# Patient Record
Sex: FEMALE | Race: WHITE | NOT HISPANIC OR LATINO | Employment: OTHER | URBAN - NONMETROPOLITAN AREA
[De-identification: names, ages, dates, MRNs, and addresses within clinical notes are randomized per-mention and may not be internally consistent; named-entity substitution may affect disease eponyms.]

---

## 2017-01-25 ENCOUNTER — APPOINTMENT (OUTPATIENT)
Dept: GENERAL RADIOLOGY | Facility: HOSPITAL | Age: 57
End: 2017-01-25

## 2017-01-25 ENCOUNTER — HOSPITAL ENCOUNTER (OUTPATIENT)
Facility: HOSPITAL | Age: 57
Setting detail: OBSERVATION
Discharge: HOME | End: 2017-01-28
Attending: EMERGENCY MEDICINE | Admitting: INTERNAL MEDICINE

## 2017-01-25 ENCOUNTER — APPOINTMENT (OUTPATIENT)
Dept: CT IMAGING | Facility: HOSPITAL | Age: 57
End: 2017-01-25

## 2017-01-25 DIAGNOSIS — R07.2 PRECORDIAL PAIN: Primary | ICD-10-CM

## 2017-01-25 DIAGNOSIS — G62.9 NEUROPATHY: ICD-10-CM

## 2017-01-25 DIAGNOSIS — R53.1 WEAKNESS: ICD-10-CM

## 2017-01-25 PROBLEM — K21.9 GASTROESOPHAGEAL REFLUX DISEASE WITHOUT ESOPHAGITIS: Status: ACTIVE | Noted: 2017-01-25

## 2017-01-25 PROBLEM — R07.9 CHEST PAIN: Status: ACTIVE | Noted: 2017-01-25

## 2017-01-25 PROBLEM — E78.5 HYPERLIPIDEMIA: Status: ACTIVE | Noted: 2017-01-25

## 2017-01-25 PROBLEM — E11.9 TYPE 2 DIABETES MELLITUS: Status: ACTIVE | Noted: 2017-01-25

## 2017-01-25 PROBLEM — I10 ESSENTIAL HYPERTENSION: Status: ACTIVE | Noted: 2017-01-25

## 2017-01-25 LAB
ALBUMIN SERPL-MCNC: 3.5 G/DL (ref 3.4–4.8)
ALBUMIN SERPL-MCNC: 4 G/DL (ref 3.4–4.8)
ALBUMIN/GLOB SERPL: 1.3 G/DL (ref 1.1–1.8)
ALBUMIN/GLOB SERPL: 1.4 G/DL (ref 1.1–1.8)
ALP SERPL-CCNC: 43 U/L (ref 38–126)
ALP SERPL-CCNC: 60 U/L (ref 38–126)
ALT SERPL W P-5'-P-CCNC: 22 U/L (ref 9–52)
ALT SERPL W P-5'-P-CCNC: 29 U/L (ref 9–52)
ANION GAP SERPL CALCULATED.3IONS-SCNC: 14 MMOL/L (ref 5–15)
ANION GAP SERPL CALCULATED.3IONS-SCNC: 8 MMOL/L (ref 5–15)
AST SERPL-CCNC: 12 U/L (ref 14–36)
AST SERPL-CCNC: 15 U/L (ref 14–36)
BASOPHILS # BLD AUTO: 0.01 10*3/MM3 (ref 0–0.2)
BASOPHILS # BLD AUTO: 0.01 10*3/MM3 (ref 0–0.2)
BASOPHILS NFR BLD AUTO: 0.1 % (ref 0–2)
BASOPHILS NFR BLD AUTO: 0.1 % (ref 0–2)
BILIRUB SERPL-MCNC: 0.5 MG/DL (ref 0.2–1.3)
BILIRUB SERPL-MCNC: 0.5 MG/DL (ref 0.2–1.3)
BUN BLD-MCNC: 15 MG/DL (ref 7–21)
BUN BLD-MCNC: 18 MG/DL (ref 7–21)
BUN/CREAT SERPL: 18.4 (ref 7–25)
BUN/CREAT SERPL: 19.2 (ref 7–25)
CALCIUM SPEC-SCNC: 8.8 MG/DL (ref 8.4–10.2)
CALCIUM SPEC-SCNC: 9.8 MG/DL (ref 8.4–10.2)
CHLORIDE SERPL-SCNC: 101 MMOL/L (ref 95–110)
CHLORIDE SERPL-SCNC: 94 MMOL/L (ref 95–110)
CO2 SERPL-SCNC: 25 MMOL/L (ref 22–31)
CO2 SERPL-SCNC: 31 MMOL/L (ref 22–31)
CREAT BLD-MCNC: 0.78 MG/DL (ref 0.5–1)
CREAT BLD-MCNC: 0.98 MG/DL (ref 0.5–1)
DEPRECATED RDW RBC AUTO: 47.1 FL (ref 36.4–46.3)
DEPRECATED RDW RBC AUTO: 48.2 FL (ref 36.4–46.3)
EOSINOPHIL # BLD AUTO: 0 10*3/MM3 (ref 0–0.7)
EOSINOPHIL # BLD AUTO: 0.05 10*3/MM3 (ref 0–0.7)
EOSINOPHIL NFR BLD AUTO: 0 % (ref 0–7)
EOSINOPHIL NFR BLD AUTO: 0.5 % (ref 0–7)
ERYTHROCYTE [DISTWIDTH] IN BLOOD BY AUTOMATED COUNT: 14.9 % (ref 11.5–14.5)
ERYTHROCYTE [DISTWIDTH] IN BLOOD BY AUTOMATED COUNT: 15.3 % (ref 11.5–14.5)
GFR SERPL CREATININE-BSD FRML MDRD: 59 ML/MIN/1.73 (ref 51–120)
GFR SERPL CREATININE-BSD FRML MDRD: 77 ML/MIN/1.73 (ref 51–120)
GLOBULIN UR ELPH-MCNC: 2.5 GM/DL (ref 2.3–3.5)
GLOBULIN UR ELPH-MCNC: 3 GM/DL (ref 2.3–3.5)
GLUCOSE BLD-MCNC: 101 MG/DL (ref 60–100)
GLUCOSE BLD-MCNC: 162 MG/DL (ref 60–100)
GLUCOSE BLDC GLUCOMTR-MCNC: 187 MG/DL (ref 70–130)
HCG SERPL QL: NEGATIVE
HCT VFR BLD AUTO: 38.2 % (ref 35–45)
HCT VFR BLD AUTO: 42.6 % (ref 35–45)
HGB BLD-MCNC: 12.9 G/DL (ref 12–15.5)
HGB BLD-MCNC: 14.2 G/DL (ref 12–15.5)
IMM GRANULOCYTES # BLD: 0.04 10*3/MM3 (ref 0–0.02)
IMM GRANULOCYTES # BLD: 0.07 10*3/MM3 (ref 0–0.02)
IMM GRANULOCYTES NFR BLD: 0.4 % (ref 0–0.5)
IMM GRANULOCYTES NFR BLD: 0.7 % (ref 0–0.5)
LYMPHOCYTES # BLD AUTO: 1.8 10*3/MM3 (ref 0.6–4.2)
LYMPHOCYTES # BLD AUTO: 3.4 10*3/MM3 (ref 0.6–4.2)
LYMPHOCYTES NFR BLD AUTO: 17.7 % (ref 10–50)
LYMPHOCYTES NFR BLD AUTO: 33.2 % (ref 10–50)
MCH RBC QN AUTO: 28.7 PG (ref 26.5–34)
MCH RBC QN AUTO: 29.1 PG (ref 26.5–34)
MCHC RBC AUTO-ENTMCNC: 33.3 G/DL (ref 31.4–36)
MCHC RBC AUTO-ENTMCNC: 33.8 G/DL (ref 31.4–36)
MCV RBC AUTO: 86.2 FL (ref 80–98)
MCV RBC AUTO: 86.2 FL (ref 80–98)
MONOCYTES # BLD AUTO: 0.81 10*3/MM3 (ref 0–0.9)
MONOCYTES # BLD AUTO: 0.83 10*3/MM3 (ref 0–0.9)
MONOCYTES NFR BLD AUTO: 8 % (ref 0–12)
MONOCYTES NFR BLD AUTO: 8.1 % (ref 0–12)
NEUTROPHILS # BLD AUTO: 5.91 10*3/MM3 (ref 2–8.6)
NEUTROPHILS # BLD AUTO: 7.48 10*3/MM3 (ref 2–8.6)
NEUTROPHILS NFR BLD AUTO: 57.7 % (ref 37–80)
NEUTROPHILS NFR BLD AUTO: 73.5 % (ref 37–80)
NT-PROBNP SERPL-MCNC: 111 PG/ML (ref 0–900)
PLATELET # BLD AUTO: 269 10*3/MM3 (ref 150–450)
PLATELET # BLD AUTO: 323 10*3/MM3 (ref 150–450)
PMV BLD AUTO: 10.2 FL (ref 8–12)
PMV BLD AUTO: 10.3 FL (ref 8–12)
POTASSIUM BLD-SCNC: 3.7 MMOL/L (ref 3.5–5.1)
POTASSIUM BLD-SCNC: 4 MMOL/L (ref 3.5–5.1)
PROT SERPL-MCNC: 6 G/DL (ref 6.3–8.6)
PROT SERPL-MCNC: 7 G/DL (ref 6.3–8.6)
RBC # BLD AUTO: 4.43 10*6/MM3 (ref 3.77–5.16)
RBC # BLD AUTO: 4.94 10*6/MM3 (ref 3.77–5.16)
SODIUM BLD-SCNC: 133 MMOL/L (ref 137–145)
SODIUM BLD-SCNC: 140 MMOL/L (ref 137–145)
TROPONIN I SERPL-MCNC: <0.012 NG/ML
TROPONIN I SERPL-MCNC: <0.012 NG/ML
WBC NRBC COR # BLD: 10.17 10*3/MM3 (ref 3.2–9.8)
WBC NRBC COR # BLD: 10.24 10*3/MM3 (ref 3.2–9.8)
WHOLE BLOOD HOLD SPECIMEN: NORMAL

## 2017-01-25 PROCEDURE — 99284 EMERGENCY DEPT VISIT MOD MDM: CPT

## 2017-01-25 PROCEDURE — 94640 AIRWAY INHALATION TREATMENT: CPT

## 2017-01-25 PROCEDURE — 80053 COMPREHEN METABOLIC PANEL: CPT | Performed by: STUDENT IN AN ORGANIZED HEALTH CARE EDUCATION/TRAINING PROGRAM

## 2017-01-25 PROCEDURE — 84703 CHORIONIC GONADOTROPIN ASSAY: CPT | Performed by: EMERGENCY MEDICINE

## 2017-01-25 PROCEDURE — 25010000002 ONDANSETRON PER 1 MG: Performed by: EMERGENCY MEDICINE

## 2017-01-25 PROCEDURE — 80053 COMPREHEN METABOLIC PANEL: CPT | Performed by: EMERGENCY MEDICINE

## 2017-01-25 PROCEDURE — 70450 CT HEAD/BRAIN W/O DYE: CPT

## 2017-01-25 PROCEDURE — 93005 ELECTROCARDIOGRAM TRACING: CPT | Performed by: EMERGENCY MEDICINE

## 2017-01-25 PROCEDURE — 84484 ASSAY OF TROPONIN QUANT: CPT | Performed by: STUDENT IN AN ORGANIZED HEALTH CARE EDUCATION/TRAINING PROGRAM

## 2017-01-25 PROCEDURE — 83880 ASSAY OF NATRIURETIC PEPTIDE: CPT | Performed by: EMERGENCY MEDICINE

## 2017-01-25 PROCEDURE — G0378 HOSPITAL OBSERVATION PER HR: HCPCS

## 2017-01-25 PROCEDURE — 71020 HC CHEST PA AND LATERAL: CPT

## 2017-01-25 PROCEDURE — 25010000002 HYDROMORPHONE PER 4 MG: Performed by: EMERGENCY MEDICINE

## 2017-01-25 PROCEDURE — 93010 ELECTROCARDIOGRAM REPORT: CPT | Performed by: INTERNAL MEDICINE

## 2017-01-25 PROCEDURE — 85025 COMPLETE CBC W/AUTO DIFF WBC: CPT | Performed by: STUDENT IN AN ORGANIZED HEALTH CARE EDUCATION/TRAINING PROGRAM

## 2017-01-25 PROCEDURE — 96375 TX/PRO/DX INJ NEW DRUG ADDON: CPT

## 2017-01-25 PROCEDURE — 82962 GLUCOSE BLOOD TEST: CPT

## 2017-01-25 PROCEDURE — 93005 ELECTROCARDIOGRAM TRACING: CPT

## 2017-01-25 PROCEDURE — 93005 ELECTROCARDIOGRAM TRACING: CPT | Performed by: STUDENT IN AN ORGANIZED HEALTH CARE EDUCATION/TRAINING PROGRAM

## 2017-01-25 PROCEDURE — 85025 COMPLETE CBC W/AUTO DIFF WBC: CPT | Performed by: EMERGENCY MEDICINE

## 2017-01-25 PROCEDURE — 96374 THER/PROPH/DIAG INJ IV PUSH: CPT

## 2017-01-25 PROCEDURE — 84484 ASSAY OF TROPONIN QUANT: CPT | Performed by: EMERGENCY MEDICINE

## 2017-01-25 RX ORDER — SODIUM CHLORIDE 0.9 % (FLUSH) 0.9 %
10 SYRINGE (ML) INJECTION AS NEEDED
Status: DISCONTINUED | OUTPATIENT
Start: 2017-01-25 | End: 2017-01-28 | Stop reason: HOSPADM

## 2017-01-25 RX ORDER — LOSARTAN POTASSIUM 25 MG/1
12.5 TABLET ORAL NIGHTLY
COMMUNITY
Start: 2015-05-05

## 2017-01-25 RX ORDER — ONDANSETRON 2 MG/ML
4 INJECTION INTRAMUSCULAR; INTRAVENOUS ONCE
Status: COMPLETED | OUTPATIENT
Start: 2017-01-25 | End: 2017-01-25

## 2017-01-25 RX ORDER — EPINEPHRINE 0.15 MG/.3ML
0.15 INJECTION INTRAMUSCULAR AS NEEDED
COMMUNITY
End: 2022-10-28 | Stop reason: SDUPTHER

## 2017-01-25 RX ORDER — ASPIRIN 81 MG/1
81 TABLET ORAL DAILY
Status: DISCONTINUED | OUTPATIENT
Start: 2017-01-26 | End: 2017-01-28 | Stop reason: HOSPADM

## 2017-01-25 RX ORDER — BUDESONIDE 0.5 MG/2ML
0.5 INHALANT ORAL
Status: DISCONTINUED | OUTPATIENT
Start: 2017-01-25 | End: 2017-01-28 | Stop reason: HOSPADM

## 2017-01-25 RX ORDER — OMEPRAZOLE 20 MG/1
40 CAPSULE, DELAYED RELEASE ORAL DAILY
COMMUNITY
End: 2019-03-12

## 2017-01-25 RX ORDER — ASPIRIN 81 MG/1
324 TABLET, CHEWABLE ORAL ONCE
Status: DISCONTINUED | OUTPATIENT
Start: 2017-01-25 | End: 2017-01-25 | Stop reason: SDUPTHER

## 2017-01-25 RX ORDER — NALOXONE HCL 0.4 MG/ML
0.4 VIAL (ML) INJECTION
Status: DISCONTINUED | OUTPATIENT
Start: 2017-01-25 | End: 2017-01-28 | Stop reason: HOSPADM

## 2017-01-25 RX ORDER — LOSARTAN POTASSIUM 25 MG/1
25 TABLET ORAL
Status: DISCONTINUED | OUTPATIENT
Start: 2017-01-26 | End: 2017-01-27

## 2017-01-25 RX ORDER — LEVOCETIRIZINE DIHYDROCHLORIDE 5 MG/1
5 TABLET, FILM COATED ORAL DAILY PRN
COMMUNITY
End: 2020-11-15

## 2017-01-25 RX ORDER — ALBUTEROL SULFATE 90 UG/1
2 AEROSOL, METERED RESPIRATORY (INHALATION) EVERY 6 HOURS
COMMUNITY
End: 2022-09-15

## 2017-01-25 RX ORDER — METOPROLOL SUCCINATE 25 MG/1
25 TABLET, EXTENDED RELEASE ORAL DAILY
Status: DISCONTINUED | OUTPATIENT
Start: 2017-01-26 | End: 2017-01-28 | Stop reason: HOSPADM

## 2017-01-25 RX ORDER — ACETAMINOPHEN 325 MG/1
650 TABLET ORAL EVERY 6 HOURS PRN
Status: DISCONTINUED | OUTPATIENT
Start: 2017-01-25 | End: 2017-01-28 | Stop reason: HOSPADM

## 2017-01-25 RX ORDER — CETIRIZINE HYDROCHLORIDE 5 MG/1
5 TABLET ORAL NIGHTLY
Status: DISCONTINUED | OUTPATIENT
Start: 2017-01-25 | End: 2017-01-28 | Stop reason: HOSPADM

## 2017-01-25 RX ORDER — SIMVASTATIN 10 MG
10 TABLET ORAL NIGHTLY
COMMUNITY
End: 2019-03-12

## 2017-01-25 RX ORDER — DEXTROSE MONOHYDRATE 25 G/50ML
25 INJECTION, SOLUTION INTRAVENOUS
Status: DISCONTINUED | OUTPATIENT
Start: 2017-01-25 | End: 2017-01-28 | Stop reason: HOSPADM

## 2017-01-25 RX ORDER — PANTOPRAZOLE SODIUM 40 MG/1
40 TABLET, DELAYED RELEASE ORAL
Status: DISCONTINUED | OUTPATIENT
Start: 2017-01-26 | End: 2017-01-28 | Stop reason: HOSPADM

## 2017-01-25 RX ORDER — PREGABALIN 75 MG/1
75 CAPSULE ORAL 2 TIMES DAILY
COMMUNITY
Start: 2015-06-23 | End: 2019-03-12

## 2017-01-25 RX ORDER — DOCUSATE SODIUM 100 MG/1
100 CAPSULE, LIQUID FILLED ORAL 2 TIMES DAILY
Status: DISCONTINUED | OUTPATIENT
Start: 2017-01-26 | End: 2017-01-28 | Stop reason: HOSPADM

## 2017-01-25 RX ORDER — ATORVASTATIN CALCIUM 10 MG/1
10 TABLET, FILM COATED ORAL NIGHTLY
Status: DISCONTINUED | OUTPATIENT
Start: 2017-01-25 | End: 2017-01-28 | Stop reason: HOSPADM

## 2017-01-25 RX ORDER — MORPHINE SULFATE 2 MG/ML
1 INJECTION, SOLUTION INTRAMUSCULAR; INTRAVENOUS EVERY 4 HOURS PRN
Status: DISCONTINUED | OUTPATIENT
Start: 2017-01-25 | End: 2017-01-28 | Stop reason: HOSPADM

## 2017-01-25 RX ORDER — SODIUM CHLORIDE 0.9 % (FLUSH) 0.9 %
1-10 SYRINGE (ML) INJECTION AS NEEDED
Status: DISCONTINUED | OUTPATIENT
Start: 2017-01-25 | End: 2017-01-28 | Stop reason: HOSPADM

## 2017-01-25 RX ORDER — METHYLPREDNISOLONE 4 MG/1
4 TABLET ORAL 2 TIMES DAILY
COMMUNITY
End: 2017-01-28 | Stop reason: HOSPADM

## 2017-01-25 RX ORDER — ALBUTEROL SULFATE 2.5 MG/3ML
2.5 SOLUTION RESPIRATORY (INHALATION)
Status: DISCONTINUED | OUTPATIENT
Start: 2017-01-26 | End: 2017-01-28 | Stop reason: HOSPADM

## 2017-01-25 RX ORDER — NICOTINE POLACRILEX 4 MG
15 LOZENGE BUCCAL
Status: DISCONTINUED | OUTPATIENT
Start: 2017-01-25 | End: 2017-01-28 | Stop reason: HOSPADM

## 2017-01-25 RX ORDER — MELOXICAM 15 MG/1
15 TABLET ORAL NIGHTLY
COMMUNITY
End: 2019-03-12

## 2017-01-25 RX ORDER — ONDANSETRON 2 MG/ML
4 INJECTION INTRAMUSCULAR; INTRAVENOUS EVERY 6 HOURS PRN
Status: DISCONTINUED | OUTPATIENT
Start: 2017-01-25 | End: 2017-01-28 | Stop reason: HOSPADM

## 2017-01-25 RX ORDER — PREGABALIN 75 MG/1
75 CAPSULE ORAL EVERY 12 HOURS SCHEDULED
Status: DISCONTINUED | OUTPATIENT
Start: 2017-01-25 | End: 2017-01-28 | Stop reason: HOSPADM

## 2017-01-25 RX ORDER — RANITIDINE 150 MG/1
300 TABLET ORAL NIGHTLY
COMMUNITY
End: 2020-08-05

## 2017-01-25 RX ORDER — ASPIRIN 325 MG
325 TABLET ORAL ONCE
Status: DISCONTINUED | OUTPATIENT
Start: 2017-01-25 | End: 2017-01-25

## 2017-01-25 RX ORDER — ALBUTEROL SULFATE 90 UG/1
2 AEROSOL, METERED RESPIRATORY (INHALATION) EVERY 6 HOURS
Status: DISCONTINUED | OUTPATIENT
Start: 2017-01-25 | End: 2017-01-25 | Stop reason: CLARIF

## 2017-01-25 RX ADMIN — HYDROMORPHONE HYDROCHLORIDE 0.5 MG: 1 INJECTION, SOLUTION INTRAMUSCULAR; INTRAVENOUS; SUBCUTANEOUS at 14:41

## 2017-01-25 RX ADMIN — ONDANSETRON 4 MG: 2 INJECTION INTRAMUSCULAR; INTRAVENOUS at 14:42

## 2017-01-25 RX ADMIN — ACETAMINOPHEN 650 MG: 325 TABLET ORAL at 22:34

## 2017-01-25 RX ADMIN — ALBUTEROL SULFATE 2.5 MG: 2.5 SOLUTION RESPIRATORY (INHALATION) at 23:42

## 2017-01-25 RX ADMIN — NITROGLYCERIN 1 INCH: 20 OINTMENT TOPICAL at 15:37

## 2017-01-26 ENCOUNTER — APPOINTMENT (OUTPATIENT)
Dept: CARDIOLOGY | Facility: HOSPITAL | Age: 57
End: 2017-01-26
Attending: STUDENT IN AN ORGANIZED HEALTH CARE EDUCATION/TRAINING PROGRAM

## 2017-01-26 ENCOUNTER — APPOINTMENT (OUTPATIENT)
Dept: ULTRASOUND IMAGING | Facility: HOSPITAL | Age: 57
End: 2017-01-26

## 2017-01-26 LAB
ALBUMIN SERPL-MCNC: 3.4 G/DL (ref 3.4–4.8)
ALBUMIN/GLOB SERPL: 1.4 G/DL (ref 1.1–1.8)
ALP SERPL-CCNC: 46 U/L (ref 38–126)
ALT SERPL W P-5'-P-CCNC: 25 U/L (ref 9–52)
ANION GAP SERPL CALCULATED.3IONS-SCNC: 10 MMOL/L (ref 5–15)
ARTICHOKE IGE QN: 87 MG/DL (ref 1–129)
AST SERPL-CCNC: 15 U/L (ref 14–36)
BASOPHILS # BLD AUTO: 0.01 10*3/MM3 (ref 0–0.2)
BASOPHILS NFR BLD AUTO: 0.1 % (ref 0–2)
BILIRUB SERPL-MCNC: 0.5 MG/DL (ref 0.2–1.3)
BUN BLD-MCNC: 18 MG/DL (ref 7–21)
BUN/CREAT SERPL: 19.1 (ref 7–25)
CALCIUM SPEC-SCNC: 8.5 MG/DL (ref 8.4–10.2)
CHLORIDE SERPL-SCNC: 100 MMOL/L (ref 95–110)
CHOLEST SERPL-MCNC: 174 MG/DL (ref 0–199)
CO2 SERPL-SCNC: 29 MMOL/L (ref 22–31)
CREAT BLD-MCNC: 0.94 MG/DL (ref 0.5–1)
DEPRECATED RDW RBC AUTO: 47.6 FL (ref 36.4–46.3)
EOSINOPHIL # BLD AUTO: 0.06 10*3/MM3 (ref 0–0.7)
EOSINOPHIL NFR BLD AUTO: 0.7 % (ref 0–7)
ERYTHROCYTE [DISTWIDTH] IN BLOOD BY AUTOMATED COUNT: 15 % (ref 11.5–14.5)
GFR SERPL CREATININE-BSD FRML MDRD: 62 ML/MIN/1.73 (ref 51–120)
GLOBULIN UR ELPH-MCNC: 2.4 GM/DL (ref 2.3–3.5)
GLUCOSE BLD-MCNC: 110 MG/DL (ref 60–100)
GLUCOSE BLDC GLUCOMTR-MCNC: 102 MG/DL (ref 70–130)
GLUCOSE BLDC GLUCOMTR-MCNC: 164 MG/DL (ref 70–130)
HCT VFR BLD AUTO: 38.6 % (ref 35–45)
HDLC SERPL-MCNC: 61 MG/DL (ref 60–200)
HGB BLD-MCNC: 12.7 G/DL (ref 12–15.5)
IMM GRANULOCYTES # BLD: 0.05 10*3/MM3 (ref 0–0.02)
IMM GRANULOCYTES NFR BLD: 0.6 % (ref 0–0.5)
LDLC/HDLC SERPL: 1.45 {RATIO} (ref 0–3.22)
LYMPHOCYTES # BLD AUTO: 3.24 10*3/MM3 (ref 0.6–4.2)
LYMPHOCYTES NFR BLD AUTO: 37.1 % (ref 10–50)
MCH RBC QN AUTO: 28.7 PG (ref 26.5–34)
MCHC RBC AUTO-ENTMCNC: 32.9 G/DL (ref 31.4–36)
MCV RBC AUTO: 87.3 FL (ref 80–98)
MONOCYTES # BLD AUTO: 0.69 10*3/MM3 (ref 0–0.9)
MONOCYTES NFR BLD AUTO: 7.9 % (ref 0–12)
NEUTROPHILS # BLD AUTO: 4.69 10*3/MM3 (ref 2–8.6)
NEUTROPHILS NFR BLD AUTO: 53.6 % (ref 37–80)
PLATELET # BLD AUTO: 244 10*3/MM3 (ref 150–450)
PMV BLD AUTO: 10.5 FL (ref 8–12)
POTASSIUM BLD-SCNC: 3.8 MMOL/L (ref 3.5–5.1)
PROT SERPL-MCNC: 5.8 G/DL (ref 6.3–8.6)
RBC # BLD AUTO: 4.42 10*6/MM3 (ref 3.77–5.16)
SODIUM BLD-SCNC: 139 MMOL/L (ref 137–145)
TRIGL SERPL-MCNC: 124 MG/DL (ref 20–199)
TROPONIN I SERPL-MCNC: <0.012 NG/ML
TROPONIN I SERPL-MCNC: <0.012 NG/ML
WBC NRBC COR # BLD: 8.74 10*3/MM3 (ref 3.2–9.8)

## 2017-01-26 PROCEDURE — 80053 COMPREHEN METABOLIC PANEL: CPT | Performed by: STUDENT IN AN ORGANIZED HEALTH CARE EDUCATION/TRAINING PROGRAM

## 2017-01-26 PROCEDURE — 25010000002 MORPHINE SULFATE (PF) 2 MG/ML SOLUTION: Performed by: STUDENT IN AN ORGANIZED HEALTH CARE EDUCATION/TRAINING PROGRAM

## 2017-01-26 PROCEDURE — 96375 TX/PRO/DX INJ NEW DRUG ADDON: CPT

## 2017-01-26 PROCEDURE — G0378 HOSPITAL OBSERVATION PER HR: HCPCS

## 2017-01-26 PROCEDURE — 63710000001 INSULIN ASPART PER 5 UNITS: Performed by: STUDENT IN AN ORGANIZED HEALTH CARE EDUCATION/TRAINING PROGRAM

## 2017-01-26 PROCEDURE — 93923 UPR/LXTR ART STDY 3+ LVLS: CPT

## 2017-01-26 PROCEDURE — 82962 GLUCOSE BLOOD TEST: CPT

## 2017-01-26 PROCEDURE — 96376 TX/PRO/DX INJ SAME DRUG ADON: CPT

## 2017-01-26 PROCEDURE — 94640 AIRWAY INHALATION TREATMENT: CPT

## 2017-01-26 PROCEDURE — 84484 ASSAY OF TROPONIN QUANT: CPT | Performed by: STUDENT IN AN ORGANIZED HEALTH CARE EDUCATION/TRAINING PROGRAM

## 2017-01-26 PROCEDURE — 93306 TTE W/DOPPLER COMPLETE: CPT

## 2017-01-26 PROCEDURE — 85025 COMPLETE CBC W/AUTO DIFF WBC: CPT | Performed by: STUDENT IN AN ORGANIZED HEALTH CARE EDUCATION/TRAINING PROGRAM

## 2017-01-26 PROCEDURE — 80061 LIPID PANEL: CPT | Performed by: STUDENT IN AN ORGANIZED HEALTH CARE EDUCATION/TRAINING PROGRAM

## 2017-01-26 RX ORDER — BUTALBITAL, ACETAMINOPHEN AND CAFFEINE 50; 325; 40 MG/1; MG/1; MG/1
1 TABLET ORAL EVERY 4 HOURS PRN
Status: DISCONTINUED | OUTPATIENT
Start: 2017-01-26 | End: 2017-01-28 | Stop reason: HOSPADM

## 2017-01-26 RX ORDER — IBUPROFEN 400 MG/1
400 TABLET ORAL EVERY 8 HOURS SCHEDULED
Status: DISCONTINUED | OUTPATIENT
Start: 2017-01-26 | End: 2017-01-28 | Stop reason: HOSPADM

## 2017-01-26 RX ADMIN — ALBUTEROL SULFATE 2.5 MG: 2.5 SOLUTION RESPIRATORY (INHALATION) at 06:56

## 2017-01-26 RX ADMIN — ALBUTEROL SULFATE 2.5 MG: 2.5 SOLUTION RESPIRATORY (INHALATION) at 19:07

## 2017-01-26 RX ADMIN — PREGABALIN 75 MG: 75 CAPSULE ORAL at 20:41

## 2017-01-26 RX ADMIN — BUDESONIDE 0.5 MG: 0.5 INHALANT RESPIRATORY (INHALATION) at 06:56

## 2017-01-26 RX ADMIN — IBUPROFEN 400 MG: 400 TABLET, FILM COATED ORAL at 13:53

## 2017-01-26 RX ADMIN — BUTALBITAL, ACETAMINOPHEN, AND CAFFEINE 1 TABLET: 50; 325; 40 TABLET ORAL at 21:49

## 2017-01-26 RX ADMIN — Medication 10 ML: at 13:23

## 2017-01-26 RX ADMIN — DOCUSATE SODIUM 100 MG: 100 CAPSULE, LIQUID FILLED ORAL at 18:11

## 2017-01-26 RX ADMIN — ASPIRIN 81 MG: 81 TABLET, COATED ORAL at 08:48

## 2017-01-26 RX ADMIN — BUDESONIDE 0.5 MG: 0.5 INHALANT RESPIRATORY (INHALATION) at 19:08

## 2017-01-26 RX ADMIN — ATORVASTATIN CALCIUM 10 MG: 10 TABLET, FILM COATED ORAL at 20:41

## 2017-01-26 RX ADMIN — IBUPROFEN 400 MG: 400 TABLET, FILM COATED ORAL at 21:48

## 2017-01-26 RX ADMIN — PREGABALIN 75 MG: 75 CAPSULE ORAL at 08:52

## 2017-01-26 RX ADMIN — MORPHINE SULFATE 1 MG: 2 INJECTION, SOLUTION INTRAMUSCULAR; INTRAVENOUS at 08:35

## 2017-01-26 RX ADMIN — CETIRIZINE HYDROCHLORIDE 5 MG: 5 TABLET, FILM COATED ORAL at 20:41

## 2017-01-26 RX ADMIN — INSULIN ASPART 2 UNITS: 100 INJECTION, SOLUTION INTRAVENOUS; SUBCUTANEOUS at 12:12

## 2017-01-26 RX ADMIN — MORPHINE SULFATE 1 MG: 2 INJECTION, SOLUTION INTRAMUSCULAR; INTRAVENOUS at 02:04

## 2017-01-26 RX ADMIN — ALBUTEROL SULFATE 2.5 MG: 2.5 SOLUTION RESPIRATORY (INHALATION) at 15:00

## 2017-01-26 RX ADMIN — MORPHINE SULFATE 1 MG: 2 INJECTION, SOLUTION INTRAMUSCULAR; INTRAVENOUS at 13:22

## 2017-01-27 ENCOUNTER — APPOINTMENT (OUTPATIENT)
Dept: ULTRASOUND IMAGING | Facility: HOSPITAL | Age: 57
End: 2017-01-27

## 2017-01-27 ENCOUNTER — HOSPITAL ENCOUNTER (OUTPATIENT)
Dept: ULTRASOUND IMAGING | Facility: HOSPITAL | Age: 57
End: 2017-01-27

## 2017-01-27 LAB
ALBUMIN SERPL-MCNC: 3 G/DL (ref 3.4–4.8)
ALBUMIN/GLOB SERPL: 1.2 G/DL (ref 1.1–1.8)
ALP SERPL-CCNC: 47 U/L (ref 38–126)
ALT SERPL W P-5'-P-CCNC: 32 U/L (ref 9–52)
ANION GAP SERPL CALCULATED.3IONS-SCNC: 8 MMOL/L (ref 5–15)
AST SERPL-CCNC: 11 U/L (ref 14–36)
BASOPHILS # BLD AUTO: 0.01 10*3/MM3 (ref 0–0.2)
BASOPHILS NFR BLD AUTO: 0.1 % (ref 0–2)
BILIRUB SERPL-MCNC: 0.3 MG/DL (ref 0.2–1.3)
BILIRUB UR QL STRIP: NEGATIVE
BUN BLD-MCNC: 17 MG/DL (ref 7–21)
BUN/CREAT SERPL: 19.3 (ref 7–25)
CALCIUM SPEC-SCNC: 8.8 MG/DL (ref 8.4–10.2)
CHLORIDE SERPL-SCNC: 102 MMOL/L (ref 95–110)
CLARITY UR: CLEAR
CO2 SERPL-SCNC: 29 MMOL/L (ref 22–31)
COLOR UR: YELLOW
CREAT BLD-MCNC: 0.88 MG/DL (ref 0.5–1)
DEPRECATED RDW RBC AUTO: 50.3 FL (ref 36.4–46.3)
EOSINOPHIL # BLD AUTO: 0.11 10*3/MM3 (ref 0–0.7)
EOSINOPHIL NFR BLD AUTO: 1.4 % (ref 0–7)
ERYTHROCYTE [DISTWIDTH] IN BLOOD BY AUTOMATED COUNT: 15.4 % (ref 11.5–14.5)
GFR SERPL CREATININE-BSD FRML MDRD: 67 ML/MIN/1.73 (ref 60–120)
GLOBULIN UR ELPH-MCNC: 2.5 GM/DL (ref 2.3–3.5)
GLUCOSE BLD-MCNC: 106 MG/DL (ref 60–100)
GLUCOSE BLDC GLUCOMTR-MCNC: 102 MG/DL (ref 70–130)
GLUCOSE BLDC GLUCOMTR-MCNC: 105 MG/DL (ref 70–130)
GLUCOSE BLDC GLUCOMTR-MCNC: 105 MG/DL (ref 70–130)
GLUCOSE BLDC GLUCOMTR-MCNC: 128 MG/DL (ref 70–130)
GLUCOSE UR STRIP-MCNC: NEGATIVE MG/DL
HCT VFR BLD AUTO: 39.3 % (ref 35–45)
HGB BLD-MCNC: 12.7 G/DL (ref 12–15.5)
HGB UR QL STRIP.AUTO: NEGATIVE
IMM GRANULOCYTES # BLD: 0.04 10*3/MM3 (ref 0–0.02)
IMM GRANULOCYTES NFR BLD: 0.5 % (ref 0–0.5)
KETONES UR QL STRIP: NEGATIVE
LEUKOCYTE ESTERASE UR QL STRIP.AUTO: NEGATIVE
LYMPHOCYTES # BLD AUTO: 2.92 10*3/MM3 (ref 0.6–4.2)
LYMPHOCYTES NFR BLD AUTO: 38.2 % (ref 10–50)
MCH RBC QN AUTO: 29.1 PG (ref 26.5–34)
MCHC RBC AUTO-ENTMCNC: 32.3 G/DL (ref 31.4–36)
MCV RBC AUTO: 89.9 FL (ref 80–98)
MONOCYTES # BLD AUTO: 0.72 10*3/MM3 (ref 0–0.9)
MONOCYTES NFR BLD AUTO: 9.4 % (ref 0–12)
NEUTROPHILS # BLD AUTO: 3.84 10*3/MM3 (ref 2–8.6)
NEUTROPHILS NFR BLD AUTO: 50.4 % (ref 37–80)
NITRITE UR QL STRIP: NEGATIVE
NRBC BLD MANUAL-RTO: 0 /100 WBC (ref 0–0)
PH UR STRIP.AUTO: 7 [PH] (ref 5–9)
PLATELET # BLD AUTO: 263 10*3/MM3 (ref 150–450)
PMV BLD AUTO: 10.9 FL (ref 8–12)
POTASSIUM BLD-SCNC: 4 MMOL/L (ref 3.5–5.1)
PROT SERPL-MCNC: 5.5 G/DL (ref 6.3–8.6)
PROT UR QL STRIP: NEGATIVE
RBC # BLD AUTO: 4.37 10*6/MM3 (ref 3.77–5.16)
SODIUM BLD-SCNC: 139 MMOL/L (ref 137–145)
SP GR UR STRIP: 1.01 (ref 1–1.03)
UROBILINOGEN UR QL STRIP: NORMAL
WBC NRBC COR # BLD: 7.64 10*3/MM3 (ref 3.2–9.8)

## 2017-01-27 PROCEDURE — 76705 ECHO EXAM OF ABDOMEN: CPT

## 2017-01-27 PROCEDURE — 82962 GLUCOSE BLOOD TEST: CPT

## 2017-01-27 PROCEDURE — 94640 AIRWAY INHALATION TREATMENT: CPT

## 2017-01-27 PROCEDURE — G0378 HOSPITAL OBSERVATION PER HR: HCPCS

## 2017-01-27 PROCEDURE — 80053 COMPREHEN METABOLIC PANEL: CPT | Performed by: STUDENT IN AN ORGANIZED HEALTH CARE EDUCATION/TRAINING PROGRAM

## 2017-01-27 PROCEDURE — 85025 COMPLETE CBC W/AUTO DIFF WBC: CPT | Performed by: STUDENT IN AN ORGANIZED HEALTH CARE EDUCATION/TRAINING PROGRAM

## 2017-01-27 PROCEDURE — 87040 BLOOD CULTURE FOR BACTERIA: CPT | Performed by: INTERNAL MEDICINE

## 2017-01-27 PROCEDURE — 81003 URINALYSIS AUTO W/O SCOPE: CPT | Performed by: INTERNAL MEDICINE

## 2017-01-27 RX ADMIN — METOPROLOL SUCCINATE 25 MG: 25 TABLET, EXTENDED RELEASE ORAL at 08:21

## 2017-01-27 RX ADMIN — DOCUSATE SODIUM 100 MG: 100 CAPSULE, LIQUID FILLED ORAL at 17:27

## 2017-01-27 RX ADMIN — ACETAMINOPHEN 650 MG: 325 TABLET ORAL at 17:16

## 2017-01-27 RX ADMIN — PANTOPRAZOLE SODIUM 40 MG: 40 TABLET, DELAYED RELEASE ORAL at 06:04

## 2017-01-27 RX ADMIN — IBUPROFEN 400 MG: 400 TABLET, FILM COATED ORAL at 22:08

## 2017-01-27 RX ADMIN — CETIRIZINE HYDROCHLORIDE 5 MG: 5 TABLET, FILM COATED ORAL at 20:31

## 2017-01-27 RX ADMIN — PREGABALIN 75 MG: 75 CAPSULE ORAL at 08:22

## 2017-01-27 RX ADMIN — DOCUSATE SODIUM 100 MG: 100 CAPSULE, LIQUID FILLED ORAL at 08:22

## 2017-01-27 RX ADMIN — BUDESONIDE 0.5 MG: 0.5 INHALANT RESPIRATORY (INHALATION) at 07:03

## 2017-01-27 RX ADMIN — ALBUTEROL SULFATE 2.5 MG: 2.5 SOLUTION RESPIRATORY (INHALATION) at 07:02

## 2017-01-27 RX ADMIN — ATORVASTATIN CALCIUM 10 MG: 10 TABLET, FILM COATED ORAL at 20:30

## 2017-01-27 RX ADMIN — SODIUM CHLORIDE 500 ML: 9 INJECTION, SOLUTION INTRAVENOUS at 11:26

## 2017-01-27 RX ADMIN — ASPIRIN 81 MG: 81 TABLET, COATED ORAL at 08:21

## 2017-01-27 RX ADMIN — ALBUTEROL SULFATE 2.5 MG: 2.5 SOLUTION RESPIRATORY (INHALATION) at 14:22

## 2017-01-27 RX ADMIN — ALBUTEROL SULFATE 2.5 MG: 2.5 SOLUTION RESPIRATORY (INHALATION) at 21:14

## 2017-01-27 RX ADMIN — ALBUTEROL SULFATE 2.5 MG: 2.5 SOLUTION RESPIRATORY (INHALATION) at 01:16

## 2017-01-27 RX ADMIN — BUDESONIDE 0.5 MG: 0.5 INHALANT RESPIRATORY (INHALATION) at 21:14

## 2017-01-27 RX ADMIN — IBUPROFEN 400 MG: 400 TABLET, FILM COATED ORAL at 06:04

## 2017-01-27 RX ADMIN — LOSARTAN POTASSIUM 25 MG: 25 TABLET ORAL at 08:21

## 2017-01-27 RX ADMIN — PREGABALIN 75 MG: 75 CAPSULE ORAL at 20:32

## 2017-01-28 VITALS
HEART RATE: 83 BPM | OXYGEN SATURATION: 99 % | DIASTOLIC BLOOD PRESSURE: 78 MMHG | HEIGHT: 67 IN | BODY MASS INDEX: 28.5 KG/M2 | RESPIRATION RATE: 18 BRPM | SYSTOLIC BLOOD PRESSURE: 135 MMHG | WEIGHT: 181.6 LBS | TEMPERATURE: 98.7 F

## 2017-01-28 PROBLEM — R07.9 CHEST PAIN: Status: RESOLVED | Noted: 2017-01-25 | Resolved: 2017-01-28

## 2017-01-28 LAB
ALBUMIN SERPL-MCNC: 3.1 G/DL (ref 3.4–4.8)
ALBUMIN/GLOB SERPL: 1.2 G/DL (ref 1.1–1.8)
ALP SERPL-CCNC: 48 U/L (ref 38–126)
ALT SERPL W P-5'-P-CCNC: 35 U/L (ref 9–52)
ANION GAP SERPL CALCULATED.3IONS-SCNC: 8 MMOL/L (ref 5–15)
AST SERPL-CCNC: 13 U/L (ref 14–36)
BASOPHILS # BLD AUTO: 0.02 10*3/MM3 (ref 0–0.2)
BASOPHILS NFR BLD AUTO: 0.3 % (ref 0–2)
BH CV ECHO MEAS - ACS: 1.9 CM
BH CV ECHO MEAS - AO MAX PG (FULL): 1.2 MMHG
BH CV ECHO MEAS - AO MAX PG: 7.8 MMHG
BH CV ECHO MEAS - AO MEAN PG (FULL): 0.6 MMHG
BH CV ECHO MEAS - AO MEAN PG: 4.2 MMHG
BH CV ECHO MEAS - AO ROOT AREA: 4.7 CM^2
BH CV ECHO MEAS - AO ROOT DIAM: 2.4 CM
BH CV ECHO MEAS - AO V2 MAX: 139.6 CM/SEC
BH CV ECHO MEAS - AO V2 MEAN: 93.4 CM/SEC
BH CV ECHO MEAS - AO V2 VTI: 23 CM
BH CV ECHO MEAS - AVA(I,A): 3.9 CM^2
BH CV ECHO MEAS - AVA(I,D): 3.9 CM^2
BH CV ECHO MEAS - AVA(V,A): 3 CM^2
BH CV ECHO MEAS - AVA(V,D): 3 CM^2
BH CV ECHO MEAS - EDV(CUBED): 62.4 ML
BH CV ECHO MEAS - EDV(TEICH): 68.6 ML
BH CV ECHO MEAS - EF(CUBED): 81.9 %
BH CV ECHO MEAS - EF(TEICH): 75.2 %
BH CV ECHO MEAS - ESV(CUBED): 11.3 ML
BH CV ECHO MEAS - ESV(TEICH): 17 ML
BH CV ECHO MEAS - FS: 43.4 %
BH CV ECHO MEAS - IVS/LVPW: 0.83
BH CV ECHO MEAS - IVSD: 0.69 CM
BH CV ECHO MEAS - LV MASS(C)D: 86.6 GRAMS
BH CV ECHO MEAS - LV MAX PG: 6.6 MMHG
BH CV ECHO MEAS - LV MEAN PG: 3.6 MMHG
BH CV ECHO MEAS - LV V1 MAX: 128.5 CM/SEC
BH CV ECHO MEAS - LV V1 MEAN: 88.8 CM/SEC
BH CV ECHO MEAS - LV V1 VTI: 26.9 CM
BH CV ECHO MEAS - LVIDD: 4 CM
BH CV ECHO MEAS - LVIDS: 2.2 CM
BH CV ECHO MEAS - LVOT AREA (M): 3.5 CM^2
BH CV ECHO MEAS - LVOT AREA: 3.3 CM^2
BH CV ECHO MEAS - LVOT DIAM: 2.1 CM
BH CV ECHO MEAS - LVPWD: 0.84 CM
BH CV ECHO MEAS - MR MAX PG: 50.2 MMHG
BH CV ECHO MEAS - MR MAX VEL: 354.2 CM/SEC
BH CV ECHO MEAS - MV A MAX VEL: 71.6 CM/SEC
BH CV ECHO MEAS - MV DEC SLOPE: 431.4 CM/SEC^2
BH CV ECHO MEAS - MV E MAX VEL: 80.6 CM/SEC
BH CV ECHO MEAS - MV E/A: 1.1
BH CV ECHO MEAS - MV P1/2T MAX VEL: 112.8 CM/SEC
BH CV ECHO MEAS - MV P1/2T: 76.6 MSEC
BH CV ECHO MEAS - MVA P1/2T LCG: 2 CM^2
BH CV ECHO MEAS - MVA(P1/2T): 2.9 CM^2
BH CV ECHO MEAS - PA MAX PG: 3.5 MMHG
BH CV ECHO MEAS - PA V2 MAX: 93 CM/SEC
BH CV ECHO MEAS - SV(AO): 107.1 ML
BH CV ECHO MEAS - SV(CUBED): 51.1 ML
BH CV ECHO MEAS - SV(LVOT): 88.8 ML
BH CV ECHO MEAS - SV(TEICH): 51.6 ML
BH CV ECHO MEAS - TR MAX VEL: 165.6 CM/SEC
BILIRUB SERPL-MCNC: 0.3 MG/DL (ref 0.2–1.3)
BUN BLD-MCNC: 12 MG/DL (ref 7–21)
BUN/CREAT SERPL: 15.8 (ref 7–25)
CALCIUM SPEC-SCNC: 8.7 MG/DL (ref 8.4–10.2)
CHLORIDE SERPL-SCNC: 104 MMOL/L (ref 95–110)
CO2 SERPL-SCNC: 28 MMOL/L (ref 22–31)
CREAT BLD-MCNC: 0.76 MG/DL (ref 0.5–1)
DEPRECATED RDW RBC AUTO: 50 FL (ref 36.4–46.3)
EOSINOPHIL # BLD AUTO: 0.16 10*3/MM3 (ref 0–0.7)
EOSINOPHIL NFR BLD AUTO: 2.2 % (ref 0–7)
ERYTHROCYTE [DISTWIDTH] IN BLOOD BY AUTOMATED COUNT: 15.3 % (ref 11.5–14.5)
GFR SERPL CREATININE-BSD FRML MDRD: 79 ML/MIN/1.73 (ref 60–120)
GLOBULIN UR ELPH-MCNC: 2.5 GM/DL (ref 2.3–3.5)
GLUCOSE BLD-MCNC: 107 MG/DL (ref 60–100)
GLUCOSE BLDC GLUCOMTR-MCNC: 107 MG/DL (ref 70–130)
GLUCOSE BLDC GLUCOMTR-MCNC: 143 MG/DL (ref 70–130)
HCT VFR BLD AUTO: 39.9 % (ref 35–45)
HGB BLD-MCNC: 12.9 G/DL (ref 12–15.5)
IMM GRANULOCYTES # BLD: 0.06 10*3/MM3 (ref 0–0.02)
IMM GRANULOCYTES NFR BLD: 0.8 % (ref 0–0.5)
LYMPHOCYTES # BLD AUTO: 2.91 10*3/MM3 (ref 0.6–4.2)
LYMPHOCYTES NFR BLD AUTO: 39.8 % (ref 10–50)
MCH RBC QN AUTO: 28.9 PG (ref 26.5–34)
MCHC RBC AUTO-ENTMCNC: 32.3 G/DL (ref 31.4–36)
MCV RBC AUTO: 89.5 FL (ref 80–98)
MONOCYTES # BLD AUTO: 0.59 10*3/MM3 (ref 0–0.9)
MONOCYTES NFR BLD AUTO: 8.1 % (ref 0–12)
NEUTROPHILS # BLD AUTO: 3.58 10*3/MM3 (ref 2–8.6)
NEUTROPHILS NFR BLD AUTO: 48.8 % (ref 37–80)
NRBC BLD MANUAL-RTO: 0 /100 WBC (ref 0–0)
PLATELET # BLD AUTO: 256 10*3/MM3 (ref 150–450)
PMV BLD AUTO: 10.6 FL (ref 8–12)
POTASSIUM BLD-SCNC: 4.1 MMOL/L (ref 3.5–5.1)
PROT SERPL-MCNC: 5.6 G/DL (ref 6.3–8.6)
RBC # BLD AUTO: 4.46 10*6/MM3 (ref 3.77–5.16)
SODIUM BLD-SCNC: 140 MMOL/L (ref 137–145)
WBC NRBC COR # BLD: 7.32 10*3/MM3 (ref 3.2–9.8)

## 2017-01-28 PROCEDURE — G0378 HOSPITAL OBSERVATION PER HR: HCPCS

## 2017-01-28 PROCEDURE — 82962 GLUCOSE BLOOD TEST: CPT

## 2017-01-28 PROCEDURE — 94760 N-INVAS EAR/PLS OXIMETRY 1: CPT

## 2017-01-28 PROCEDURE — 85025 COMPLETE CBC W/AUTO DIFF WBC: CPT | Performed by: STUDENT IN AN ORGANIZED HEALTH CARE EDUCATION/TRAINING PROGRAM

## 2017-01-28 PROCEDURE — 80053 COMPREHEN METABOLIC PANEL: CPT | Performed by: STUDENT IN AN ORGANIZED HEALTH CARE EDUCATION/TRAINING PROGRAM

## 2017-01-28 PROCEDURE — 94640 AIRWAY INHALATION TREATMENT: CPT

## 2017-01-28 RX ORDER — ASPIRIN 81 MG/1
81 TABLET ORAL DAILY
Qty: 30 TABLET | Refills: 1 | Status: SHIPPED | OUTPATIENT
Start: 2017-01-28 | End: 2019-03-12

## 2017-01-28 RX ADMIN — DOCUSATE SODIUM 100 MG: 100 CAPSULE, LIQUID FILLED ORAL at 08:57

## 2017-01-28 RX ADMIN — PREGABALIN 75 MG: 75 CAPSULE ORAL at 08:58

## 2017-01-28 RX ADMIN — ALBUTEROL SULFATE 2.5 MG: 2.5 SOLUTION RESPIRATORY (INHALATION) at 06:49

## 2017-01-28 RX ADMIN — ASPIRIN 81 MG: 81 TABLET, COATED ORAL at 08:58

## 2017-01-28 RX ADMIN — PANTOPRAZOLE SODIUM 40 MG: 40 TABLET, DELAYED RELEASE ORAL at 06:28

## 2017-01-28 RX ADMIN — BUDESONIDE 0.5 MG: 0.5 INHALANT RESPIRATORY (INHALATION) at 06:58

## 2017-01-28 RX ADMIN — IBUPROFEN 400 MG: 400 TABLET, FILM COATED ORAL at 06:27

## 2017-01-28 RX ADMIN — METOPROLOL SUCCINATE 25 MG: 25 TABLET, EXTENDED RELEASE ORAL at 08:58

## 2017-02-01 LAB
BACTERIA SPEC AEROBE CULT: NORMAL
BACTERIA SPEC AEROBE CULT: NORMAL

## 2017-07-31 ENCOUNTER — APPOINTMENT (OUTPATIENT)
Dept: PHYSICAL THERAPY | Facility: HOSPITAL | Age: 57
End: 2017-07-31

## 2017-08-07 ENCOUNTER — HOSPITAL ENCOUNTER (OUTPATIENT)
Dept: PHYSICAL THERAPY | Facility: HOSPITAL | Age: 57
Setting detail: THERAPIES SERIES
Discharge: HOME | End: 2017-08-07

## 2017-08-07 DIAGNOSIS — M54.41 CHRONIC LOW BACK PAIN WITH RIGHT-SIDED SCIATICA, UNSPECIFIED BACK PAIN LATERALITY: Primary | ICD-10-CM

## 2017-08-07 DIAGNOSIS — G89.29 CHRONIC LOW BACK PAIN WITH RIGHT-SIDED SCIATICA, UNSPECIFIED BACK PAIN LATERALITY: Primary | ICD-10-CM

## 2017-08-07 PROCEDURE — 97162 PT EVAL MOD COMPLEX 30 MIN: CPT | Performed by: PHYSICAL THERAPIST

## 2017-08-07 NOTE — THERAPY EVALUATION
"    Outpatient Physical Therapy Ortho Initial Evaluation  [facility]     Patient Name: Brittni Hagen  : 1960  MRN: [id number]  Today's Date: 2017      Visit Date: 2017  Attendance:  (75 visits/year)  Subjective Improvement: n/a  Next MD Appt: 17  Recert Date: 17    Therapy Diagnosis: Low back pain with sciatica    Patient Active Problem List   Diagnosis   • Type 2 diabetes mellitus   • Hyperlipidemia   • Essential hypertension   • Gastroesophageal reflux disease without esophagitis        Past Medical History:   Diagnosis Date   • Alpha galactosidase deficiency     red meat allergy; \"alpha gal syndrome\"   • Anemia    • Arthritis    • Asthma    • COPD (chronic obstructive pulmonary disease)    • Diabetes mellitus    • Endometriosis    • Fibromyalgia    • Goiter    • Hyperlipidemia    • Hypertension     takes bp meds to help kidneys   • Neuropathy    • Sleep apnea    • Tinnitus    • Vitamin D deficiency         Past Surgical History:   Procedure Laterality Date   • CARPAL TUNNEL RELEASE Bilateral    • LAPAROSCOPIC TUBAL LIGATION     • TUBAL ABDOMINAL LIGATION         Visit Dx:     ICD-10-CM ICD-9-CM   1. Chronic low back pain with right-sided sciatica, unspecified back pain laterality M54.41 724.2    G89.29 724.3     338.29             Patient History       17 1500          History    Chief Complaint Pain  -SS      Type of Pain Back pain;Lower Extremity / Leg  -SS      Date Current Problem(s) Began --   chronic  -SS      Brief Description of Current Complaint Chronic back pain with numbness and tingling of the right lower extremity to the foot. Left lower extremity is hurting in the knee and down the calf today. Left lower extremity pain is variable in intensity and local. Right lower extremity symptoms are fairly constant. Has been seeing a chiropractor for 3-4 years due to back pain and TMJ. Recently, the adjustments have not been helping. Saw Dr. Recinos who " "diagnosed her with sciatica and referred her to [facility]. She has stopped her chiropractic appointments at this time under Dr. Recinos's orders.  female with children. Lives in a 2 story house with 2 story house with 13 steps between levels. Does not go upstairs due to \"too much trouble to climb upstairs and back down.\" Was referred to pain management for her back in the past, but did not go.\"  -SS      Patient/Caregiver Goals Relieve pain   \"Get rid of the pain.\"  -SS      Current Tobacco Use no  -SS      Smoking Status never  -SS      Patient's Rating of General Health Fair  -SS      Hand Dominance right-handed  -SS      Occupation/sports/leisure activities [employer] - . Hobbies: hasmukh degroot  -SS      What clinical tests have you had for this problem? X-ray;MRI  -      Results of Clinical Tests \"I do know that I have a problem at L4 and L5\" but unsure of diagnosis.  -SS      Pain     Pain Location Back;Leg  -      Pain at Present 5  -SS      Pain at Best 2   over past 1 month  -SS      Pain at Worst 8   over past 1 month  -SS      Pain Frequency Constant/continuous  -SS      Pain Description Numbness;Tingling;Pins and needles;Squeezing   distracting; \"there's just sheer pain\"  -SS      What Performance Factors Make the Current Problem(s) WORSE? \"up walking, trying to do everyday things that I do\" \"Lay in bed too long (1.5-2 hours).\"  -SS      What Performance Factors Make the Current Problem(s) BETTER? medications  -SS      Is your sleep disturbed? Yes  -SS      Is medication used to assist with sleep? No  -SS      Difficulties at work? \"some\"  -SS      Difficulties with ADL's? \"a little bit at a time\"  -SS      Difficulties with recreational activities? \"I can't concentrate enough to read\"  -SS      Fall Risk Assessment    Any falls in the past year: No  -SS      Does patient have a fear of falling No  -SS      Daily Activities    Primary Language English  -SS   "    Safety    Are you being hurt, hit, or frightened by anyone at home or in your life? No  -SS      Are you being neglected by a caregiver No  -SS        User Key  (r) = Recorded By, (t) = Taken By, (c) = Cosigned By    Initials Name Provider Type    SS Chance Denis, PT DPT Physical Therapist                PT Ortho       08/07/17 1600    Subjective Comments    Subjective Comments see Therapy Patient History  -SS    Precautions and Contraindications    Precautions/Limitations no known precautions/limitations  -SS    Precautions none  -SS    Contraindications none  -SS    Subjective Pain    Able to rate subjective pain? yes  -SS    Pre-Treatment Pain Level 5  -SS    Post-Treatment Pain Level 8  -SS    Posture/Observations    Alignment Options Cervical lordosis;Rounded shoulders;Lumbar lordosis  -SS    Cervical Lordosis Decreased  -SS    Rounded Shoulders Mild  -SS    Lumbar lordosis Increased   increased upper lumbars; decreased lower lumbars  -SS    Posture/Observations Comments anterior pelvic tilt; decreased R arm swing; short R gait.  -SS    Sensory Screen for Light Touch- Lower Quarter Clearing    L2 (anterior mid thigh) --   R paraesthesia; L normal  -SS    L3 (distal anterior thigh) --   R>L paraesthesia  -SS    L4 (medial lower leg/foot) --   R>L paraesthesia  -SS    L5 (lateral lower leg/great toe) --   R>L paraesthesia  -SS    S1 (bottom of foot) --   R>L paraesthesia  -SS    Special Tests/Palpation    Special Tests/Palpation --   decreased pain and LE symptoms with long-axis distraction  -SS    Lumbosacral Palpation    SI --   upslip right  -SS    Lumbosacral Segment Bilateral:;Tender   R>L  -SS    Spinous Process Bilateral:;Tender  -SS    Piriformis Right:;Tender  -SS    Gluteus Steve Right:;Tender  -SS    Quadratus Lumborum Right:;Guarded/taut;Trigger point;Bilateral:;Tender  -SS    Erector Spinae (Paraspinals) Bilateral:;Tender   R>>L  -SS    Lumbar/SI Special Tests    Slump Test (Neural  Tension) Bilateral:;Positive  -SS    SLR (Neural Tension) Right:;Negative;Left:;Positive  -SS    SHERRIE (hip vs. SI Dysfunction) --   right causes hip pain; left causes LBP  -SS    Trunk    Flexion AROM Deficit fingertips to mid-shin with low back pain  -SS    Extension AROM Deficit approx 50% with low back pain  -SS    Lt Lat Flexion AROM Deficit fingertips approx 1-inch above knee lateral joint line with pain across low back  -SS    Right Lateral Flexion AROM Deficit fingertips to fibular head without complaints  -SS    Left Hip    Hip Flexion Gross Movement (4+/5) good plus   increased LE pain  -SS    Right Hip    Hip Flexion Gross Movement (4+/5) good plus   increased LE pain  -SS    Left Knee    Knee Extension Gross Movement (5/5) normal   increased LE pain and paraesthesia  -SS    Knee Flexion Gross Movement (5/5) normal   increased LE pain and paraesthesia  -SS    Right Knee    Knee Extension Gross Movement (5/5) normal   increased LE pain and paraesthesia  -SS    Knee Flexion Gross Movement (5/5) normal   increased LE pain and paraesthesia  -SS    Left Ankle/Foot    Ankle PF Gross Movement (5/5) normal  -SS    Ankle Dorsiflexion Gross Movement (5/5) normal  -SS    Right Ankle/Foot    Ankle PF Gross Movement (5/5) normal   increased LE pain and paraesthesia  -SS    Ankle Dorsiflexion Gross Movement (5/5) normal   increased LE pain and paraesthesia  -SS      User Key  (r) = Recorded By, (t) = Taken By, (c) = Cosigned By    Initials Name Provider Type    SS Chance Denis, PT DPT Physical Therapist                            Therapy Education       08/07/17 1700          Therapy Education    Education Details DKTC, quadratus lumborum stretch  -SS      Given HEP  -SS      Program New  -SS      How Provided Verbal;Demonstration;Written  -SS      Provided to Patient  -SS      Level of Understanding Verbalized;Demonstrated  -SS        User Key  (r) = Recorded By, (t) = Taken By, (c) = Cosigned By    Initials  Name Provider Type     Chance Denis, PT DPT Physical Therapist                PT OP Goals       08/07/17 1500       PT Short Term Goals    STG Date to Achieve 08/28/17  -     STG 1 Note a >/= 50% subjective improvement in symptoms  -SS     STG 2 Decrease Modified Oswestry score to </= 40%  -     STG 3 Trunk flexion to fingertips to ankles  -     Long Term Goals    LTG Date to Achieve 09/18/17  -SS     LTG 1 Independent with HEP  -SS     LTG 2 Patient to note minimal LBP with trunk ROM  -     LTG 3 Return to PLOF  -     Time Calculation    PT Goal Re-Cert Due Date 08/28/17  -       User Key  (r) = Recorded By, (t) = Taken By, (c) = Cosigned By    Initials Name Provider Type     Chance Denis, PT DPT Physical Therapist                PT Assessment/Plan       08/07/17 1600       PT Assessment    Functional Limitations Limitation in home management;Limitations in community activities;Performance in leisure activities;Performance in work activities  -     Impairments Pain;Posture;Range of motion;Muscle strength  -     Assessment Comments Treatment deferred due to patient not bringing orders. I recommended that she utilize heat to her low back for her pain. She was instructed in DKTC and quadratus lumborum stretch for HEP.  -     Rehab Potential Good   barriers: chronicity, dx of fibromyalgia will complicate  -     Patient/caregiver participated in establishment of treatment plan and goals Yes  -SS     Patient would benefit from skilled therapy intervention Yes  -SS     PT Plan    PT Frequency 2x/week  -     Predicted Duration of Therapy Intervention (days/wks) 4-6 weeks  -     Planned CPT's? PT EVAL MOD COMPLEXITY: 17330;PT THER PROC EA 15 MIN: 12519;PT MANUAL THERAPY EA 15 MIN: 53083;PT HOT OR COLD PACK TREAT MCARE;PT ELECTRICAL STIM UNATTEND: ;PT THER SUPP EA 15 MIN;PT TRACTION LUMBAR: 74765  -     Physical Therapy Interventions (Optional Details) aquatics  exercise;home exercise program;joint mobilization;lumbar stabilization;manual therapy techniques;modalities;patient/family education;ROM (Range of Motion);strengthening;stretching  -     PT Plan Comments Lower extremity and core muscle stretching and strengthening, aquatics, manual therapy including, but not limited to, MFR and muscle energy, IFC estim with MHP for pain Trial of mechanical lumbar traction starting at 20-30% of body weight.  -       User Key  (r) = Recorded By, (t) = Taken By, (c) = Cosigned By    Initials Name Provider Type    NATALIIA Denis PT DPT Physical Therapist                                    Outcome Measures       08/07/17 1600          Modified Oswestry    Modified Oswestry Score/Comments 25/50 = 50%  -      Functional Assessment    Outcome Measure Options Modified [place]  -        User Key  (r) = Recorded By, (t) = Taken By, (c) = Cosigned By    Initials Name Provider Type     Chance Denis, PT DPT Physical Therapist            Time Calculation:   Start Time: 1550  Stop Time: 1647  Time Calculation (min): 57 min     Therapy Charges for Today     Code Description Service Date Service Provider Modifiers Qty    68977434820 HC PT EVAL MOD COMPLEXITY 4 8/7/2017 Chance Denis, PT DPT GP 1                   Chance Denis, PT, DPT, CHT  8/7/2017

## 2017-08-14 ENCOUNTER — TRANSCRIBE ORDERS (OUTPATIENT)
Dept: PHYSICAL THERAPY | Facility: HOSPITAL | Age: 57
End: 2017-08-14

## 2017-08-14 ENCOUNTER — APPOINTMENT (OUTPATIENT)
Dept: PHYSICAL THERAPY | Facility: HOSPITAL | Age: 57
End: 2017-08-14

## 2017-08-14 ENCOUNTER — HOSPITAL ENCOUNTER (OUTPATIENT)
Dept: PHYSICAL THERAPY | Facility: HOSPITAL | Age: 57
Setting detail: THERAPIES SERIES
Discharge: HOME | End: 2017-08-14

## 2017-08-14 DIAGNOSIS — M54.41 CHRONIC LOW BACK PAIN WITH RIGHT-SIDED SCIATICA, UNSPECIFIED BACK PAIN LATERALITY: Primary | ICD-10-CM

## 2017-08-14 DIAGNOSIS — G89.29 CHRONIC LOW BACK PAIN WITH RIGHT-SIDED SCIATICA, UNSPECIFIED BACK PAIN LATERALITY: Primary | ICD-10-CM

## 2017-08-14 DIAGNOSIS — G89.29 CHRONIC LOW BACK PAIN WITH SCIATICA, SCIATICA LATERALITY UNSPECIFIED, UNSPECIFIED BACK PAIN LATERALITY: Primary | ICD-10-CM

## 2017-08-14 DIAGNOSIS — M54.40 CHRONIC LOW BACK PAIN WITH SCIATICA, SCIATICA LATERALITY UNSPECIFIED, UNSPECIFIED BACK PAIN LATERALITY: Primary | ICD-10-CM

## 2017-08-14 PROCEDURE — 97110 THERAPEUTIC EXERCISES: CPT

## 2017-08-14 PROCEDURE — G0283 ELEC STIM OTHER THAN WOUND: HCPCS

## 2017-08-14 NOTE — THERAPY TREATMENT NOTE
"    Outpatient Physical Therapy Ortho Treatment Note     Patient Name: Brittni Hagen  : 1960  MRN: [id number]  Today's Date: 2017      Visit Date: 2017  Pt. Has attended 2/2 visits  Jeison 17  MD 17???  Visit Dx:    ICD-10-CM ICD-9-CM   1. Chronic low back pain with right-sided sciatica, unspecified back pain laterality M54.41 724.2    G89.29 724.3     338.29       Patient Active Problem List   Diagnosis   • Type 2 diabetes mellitus   • Hyperlipidemia   • Essential hypertension   • Gastroesophageal reflux disease without esophagitis        Past Medical History:   Diagnosis Date   • Alpha galactosidase deficiency     red meat allergy; \"alpha gal syndrome\"   • Anemia    • Arthritis    • Asthma    • COPD (chronic obstructive pulmonary disease)    • Diabetes mellitus    • Endometriosis    • Fibromyalgia    • Goiter    • Hyperlipidemia    • Hypertension     takes bp meds to help kidneys   • Neuropathy    • Sleep apnea    • Tinnitus    • Vitamin D deficiency         Past Surgical History:   Procedure Laterality Date   • CARPAL TUNNEL RELEASE Bilateral    • LAPAROSCOPIC TUBAL LIGATION     • TUBAL ABDOMINAL LIGATION                               PT Assessment/Plan       17 1626       PT Assessment    Assessment Comments Given HEP handouts provided as pt appears very rigid in trunk and hips.  Encouraged to select other footwear more diabetic appropriate and supportive for back ( wearing wedge flip flop sandals)  -SP     PT Plan    PT Frequency 2x/week  -SP     PT Plan Comments conatinue with POC  -SP       User Key  (r) = Recorded By, (t) = Taken By, (c) = Cosigned By    Initials Name Provider Type    ADILSON Leija PTA Physical Therapy Assistant                Modalities       17 1500          Subjective Comments    Subjective Comments no pool has stitches in leg  -SP      Subjective Pain    Able to rate subjective pain? yes  -SP      Pre-Treatment Pain Level 8  " -SP      Subjective Pain Comment Late getting started as pt forgot order again and MD office has not faxed order.  Were able to contact MD and received order in chart  -SP      Moist Heat    MH Applied Yes  -SP      Location low back  -SP      Rx Minutes 15 mins  -SP      MH S/P Rx Yes  -SP      ELECTRICAL STIMULATION    Attended/Unattended Unattended  -SP      Stimulation Type IFC  -SP      Location/Electrode Placement/Other low back  -SP      Rx Minutes 15 mins  -SP        User Key  (r) = Recorded By, (t) = Taken By, (c) = Cosigned By    Initials Name Provider Type    SP Yuliya Leija PTA Physical Therapy Assistant                Exercises       08/14/17 1600 08/14/17 1500       Subjective Comments    Subjective Comments  no pool has stitches in leg  -SP     Subjective Pain    Able to rate subjective pain?  yes  -SP     Pre-Treatment Pain Level  8  -SP     Post-Treatment Pain Level  7  -SP     Subjective Pain Comment  Late getting started as pt forgot order again and MD office has not faxed order.  Were able to contact MD and received order in chart  -SP     Aquatics    Aquatics performed? No  -SP      Exercise 1    Exercise Name 1 Pro 2 Level 2  -SP      Time (Minutes) 1 5 min  -SP      Exercise 2    Exercise Name 2 Modified tball prayer Stretch  -SP      Reps 2 3  -SP      Time (Seconds) 2 30 sec  -SP      Exercise 3    Exercise Name 3 SKTC  -SP      Reps 3 3  -SP      Time (Seconds) 3 30 sec  -SP      Exercise 4    Exercise Name 4 tball LTR  -SP      Reps 4 10  -SP      Exercise 5    Exercise Name 5 Piriformis S  -SP      Reps 5 3  -SP      Time (Seconds) 5 30 sec  -SP      Exercise 6    Exercise Name 6 LE nerve glide  -SP      Reps 6 3  -SP      Time (Seconds) 6 30 sec  -SP        User Key  (r) = Recorded By, (t) = Taken By, (c) = Cosigned By    Initials Name Provider Type    SP Yuliya Leija PTA Physical Therapy Assistant                               PT OP Goals       08/14/17 1628 08/14/17 1600    PT  Short Term Goals    STG Date to Achieve  08/28/17  -SP    STG 1  Note a >/= 50% subjective improvement in symptoms  -SP    STG 1 Progress  Not Met  -SP    STG 2  Decrease Modified Oswestry score to </= 40%  -SP    STG 2 Progress  Not Met  -SP    STG 3  Trunk flexion to fingertips to ankles  -SP    STG 3 Progress  Not Met  -SP    Long Term Goals    LTG Date to Achieve  09/18/17  -SP    LTG 1  Independent with HEP  -SP    LTG 1 Progress  Not Met  -SP    LTG 2  Patient to note minimal LBP with trunk ROM  -SP    LTG 2 Progress  Not Met  -SP    LTG 3  Return to PLOF  -SP    LTG 3 Progress  Not Met  -SP    Time Calculation    PT Goal Re-Cert Due Date 08/28/17  -SP 08/28/17  -SP      User Key  (r) = Recorded By, (t) = Taken By, (c) = Cosigned By    Initials Name Provider Type    SP Yuliya Leija PTA Physical Therapy Assistant                    Time Calculation:   Start Time: 1530  Stop Time: 1640  Time Calculation (min): 70 min  Total Timed Code Minutes- PT: 70 minute(s)    Therapy Charges for Today     Code Description Service Date Service Provider Modifiers Qty    04255298318 HC PT ELECTRICAL STIM UNATTENDED 8/14/2017 Yuliya Leija PTA  1    66994165475 HC PT THER SUPP EA 15 MIN 8/14/2017 Yuliya Leija PTA GP 1    34633777085 HC PT THER PROC EA 15 MIN 8/14/2017 Yuliya Leija PTA GP 4     VA TENS SUPPL 2 LEAD PER MONTH 8/14/2017 Yuliya Leija PTA  1                    Yuliya Leija PTA  8/14/2017

## 2017-08-17 ENCOUNTER — APPOINTMENT (OUTPATIENT)
Dept: PHYSICAL THERAPY | Facility: HOSPITAL | Age: 57
End: 2017-08-17

## 2017-08-22 ENCOUNTER — HOSPITAL ENCOUNTER (OUTPATIENT)
Dept: PHYSICAL THERAPY | Facility: HOSPITAL | Age: 57
Setting detail: THERAPIES SERIES
Discharge: HOME | End: 2017-08-22

## 2017-08-22 DIAGNOSIS — M54.41 CHRONIC LOW BACK PAIN WITH RIGHT-SIDED SCIATICA, UNSPECIFIED BACK PAIN LATERALITY: Primary | ICD-10-CM

## 2017-08-22 DIAGNOSIS — G89.29 CHRONIC LOW BACK PAIN WITH RIGHT-SIDED SCIATICA, UNSPECIFIED BACK PAIN LATERALITY: Primary | ICD-10-CM

## 2017-08-22 PROCEDURE — 97110 THERAPEUTIC EXERCISES: CPT

## 2017-08-22 PROCEDURE — G0283 ELEC STIM OTHER THAN WOUND: HCPCS

## 2017-08-22 PROCEDURE — 97012 MECHANICAL TRACTION THERAPY: CPT

## 2017-08-22 PROCEDURE — 97140 MANUAL THERAPY 1/> REGIONS: CPT

## 2017-08-22 NOTE — THERAPY TREATMENT NOTE
"    Outpatient Physical Therapy Ortho Treatment Note  [facility]     Patient Name: Brittni Hagen  : 1960  MRN: [id number]  Today's Date: 2017      Visit Date: 2017  Subjective Improvement: 0%  Visit Number:   34   Recert Date:   17   MD Visit:   Not sure  Total Approved Visits:  75 yr    Visit Dx:    ICD-10-CM ICD-9-CM   1. Chronic low back pain with right-sided sciatica, unspecified back pain laterality M54.41 724.2    G89.29 724.3     338.29       Patient Active Problem List   Diagnosis   • Type 2 diabetes mellitus   • Hyperlipidemia   • Essential hypertension   • Gastroesophageal reflux disease without esophagitis        Past Medical History:   Diagnosis Date   • Alpha galactosidase deficiency     red meat allergy; \"alpha gal syndrome\"   • Anemia    • Arthritis    • Asthma    • COPD (chronic obstructive pulmonary disease)    • Diabetes mellitus    • Endometriosis    • Fibromyalgia    • Goiter    • Hyperlipidemia    • Hypertension     takes bp meds to help kidneys   • Neuropathy    • Sleep apnea    • Tinnitus    • Vitamin D deficiency         Past Surgical History:   Procedure Laterality Date   • CARPAL TUNNEL RELEASE Bilateral    • LAPAROSCOPIC TUBAL LIGATION     • TUBAL ABDOMINAL LIGATION               PT Ortho       17 1700    Posture/Observations    Posture/Observations Comments rounded shlds, R ant torsion, R LE short   unable to correct with MET. Very weak HS.   -BB    Special Tests/Palpation    Special Tests/Palpation --   ttp R lumbar paraspinals, SI and QL  -BB    Transfers    Transfer, Comment Independent, Gaurded and slow.  [employer] log roll tech.   -BB      User Key  (r) = Recorded By, (t) = Taken By, (c) = Cosigned By    Initials Name Provider Type    OSMEL Ponce PTA Physical Therapy Assistant                            PT Assessment/Plan       17 1841       PT Assessment    Assessment Comments Pt tolerated 20% body wt with mechanical " traction.  No change in radicular symptoms with traction.  Unable to correct pelvic alignment with MET possibly due to poor HS activation with attempted MET.  Provided HEP sheets and pt edu on importance of core stab .   -BB     PT Plan    PT Frequency 2x/week  -BB     Predicted Duration of Therapy Intervention (days/wks) x 4-6 weeks  -BB     PT Plan Comments Assess pts tolerance to traction and therex from this treatment.  May try 30% BW per primary PT POC.  Pt having MRI of Lumbar spine this Thursday. Pt had to cx therapy appointment for that date secondary.  Unsure of MD followup.    No pool at this time due to open area healing on L LE  -BB       User Key  (r) = Recorded By, (t) = Taken By, (c) = Cosigned By    Initials Name Provider Type    OSMEL Ponce PTA Physical Therapy Assistant                Modalities       08/22/17 1700          Moist Heat    MH Applied Yes  -BB      Location low back prone  -BB      Rx Minutes 15 mins  -BB      MH S/P Rx Yes  -BB      ELECTRICAL STIMULATION    Attended/Unattended Unattended  -BB      Stimulation Type IFC  -BB      Location/Electrode Placement/Other Low back with MHP  -BB      Rx Minutes 15 mins  -BB      Traction 43640    Traction Type Lumbar  -BB      Rx Minutes 10  -BB      Duration Intermittent  -BB      Position Supine  -BB      Weight 33   20% BW  -BB      Hold 60  -BB      Relax 20   10 lbs  -BB      Progression 2  -BB      Regression 2  -BB        User Key  (r) = Recorded By, (t) = Taken By, (c) = Cosigned By    Initials Name Provider Type    OSMEL Ponce PTA Physical Therapy Assistant                Exercises       08/22/17 1700          Subjective Comments    Subjective Comments States she saw Dr Leigh last Tuesday. He changed her to Percosets and they haven't helped any. He ordered an MRI for this Thursday to L-spine.    Radicular pain R> L LE  -BB      Subjective Pain    Able to rate subjective pain? yes  -BB      Pre-Treatment Pain Level 7   -BB      Post-Treatment Pain Level 8  -BB      Aquatics    Aquatics performed? No  -BB      Exercise 1    Exercise Name 1 hooklying trans abs  -BB      Reps 1 10  -BB      Time (Seconds) 1 5  -BB      Exercise 2    Exercise Name 2 hooklying add sq  -BB      Reps 2 10  -BB      Time (Seconds) 2 5  -BB      Exercise 3    Exercise Name 3 supine piriformis stretch  -BB      Reps 3 3  -BB      Time (Seconds) 3 30  -BB        User Key  (r) = Recorded By, (t) = Taken By, (c) = Cosigned By    Initials Name Provider Type    OSMEL Ponce PTA Physical Therapy Assistant                        Manual Rx (last 36 hours)      Manual Treatments       08/22/17 1700          Manual Rx 1    Manual Rx 1 Location STM R lumbar paraspinals and QL  -BB      Manual Rx 2    Manual Rx 2 Location MET R SI  -BB      Manual Rx 2 Duration --   10 min total manual time  -BB        User Key  (r) = Recorded By, (t) = Taken By, (c) = Cosigned By    Initials Name Provider Type    OSMEL Ponce PTA Physical Therapy Assistant                PT OP Goals       08/22/17 1700       PT Short Term Goals    STG Date to Achieve 08/28/17  -BB     STG 1 Note a >/= 50% subjective improvement in symptoms  -BB     STG 1 Progress Not Met  -BB     STG 2 Decrease Modified Oswestry score to </= 40%  -BB     STG 2 Progress Not Met  -BB     STG 3 Trunk flexion to fingertips to ankles  -BB     STG 3 Progress Not Met  -BB     Long Term Goals    LTG Date to Achieve 09/18/17  -BB     LTG 1 Independent with HEP  -BB     LTG 1 Progress Not Met  -BB     LTG 2 Patient to note minimal LBP with trunk ROM  -BB     LTG 2 Progress Not Met  -BB     LTG 3 Return to PLOF  -BB     LTG 3 Progress Not Met  -BB     Time Calculation    PT Goal Re-Cert Due Date 08/28/17  -BB       User Key  (r) = Recorded By, (t) = Taken By, (c) = Cosigned By    Initials Name Provider Type    OSMEL Ponce PTA Physical Therapy Assistant                Therapy Education       08/22/17  1839          Therapy Education    Given HEP;Symptoms/condition management;Posture/body mechanics  -BB      Program New  -BB      How Provided Verbal;Demonstration;Written  -BB      Provided to Patient  -BB      Level of Understanding Verbalized;Demonstrated  -BB        User Key  (r) = Recorded By, (t) = Taken By, (c) = Cosigned By    Initials Name Provider Type    OSMEL Ponce PTA Physical Therapy Assistant                Time Calculation:   Start Time: 1730  Stop Time: 1845  Time Calculation (min): 75 min  Total Timed Code Minutes- PT: 60 minute(s)    Therapy Charges for Today     Code Description Service Date Service Provider Modifiers Qty    38148839415 HC PT TRACTION LUMBAR 8/22/2017 Torri Ponce, PTA GP 1    43720194410 HC PT THER PROC EA 15 MIN 8/22/2017 Torri Ponce PTA GP 1    47307599061 HC PT MANUAL THERAPY EA 15 MIN 8/22/2017 Torri Ponce PTA GP 1    98591647183 HC PT ELECTRICAL STIM UNATTENDED 8/22/2017 Torri Ponce PTA  1                    Torri Ponce PTA  8/22/2017

## 2017-08-24 ENCOUNTER — APPOINTMENT (OUTPATIENT)
Dept: PHYSICAL THERAPY | Facility: HOSPITAL | Age: 57
End: 2017-08-24

## 2017-08-29 ENCOUNTER — HOSPITAL ENCOUNTER (OUTPATIENT)
Dept: PHYSICAL THERAPY | Facility: HOSPITAL | Age: 57
Setting detail: THERAPIES SERIES
Discharge: HOME | End: 2017-08-29

## 2017-08-29 DIAGNOSIS — M54.41 CHRONIC LOW BACK PAIN WITH RIGHT-SIDED SCIATICA, UNSPECIFIED BACK PAIN LATERALITY: Primary | ICD-10-CM

## 2017-08-29 DIAGNOSIS — G89.29 CHRONIC LOW BACK PAIN WITH RIGHT-SIDED SCIATICA, UNSPECIFIED BACK PAIN LATERALITY: Primary | ICD-10-CM

## 2017-08-29 PROCEDURE — 97012 MECHANICAL TRACTION THERAPY: CPT

## 2017-08-29 PROCEDURE — G0283 ELEC STIM OTHER THAN WOUND: HCPCS

## 2017-08-29 PROCEDURE — 97110 THERAPEUTIC EXERCISES: CPT

## 2017-08-29 PROCEDURE — 97140 MANUAL THERAPY 1/> REGIONS: CPT

## 2017-08-29 NOTE — THERAPY TREATMENT NOTE
"    Outpatient Physical Therapy Ortho Treatment Note  [facility]     Patient Name: Brittni Hagen  : 1960  MRN: [id number]  Today's Date: 2017      Visit Date: 2017  Subjective Improvement: 0%  Visit Number:   4   Recert Date:   17  MD Visit:   17  Total Approved Visits:  75 per year    Therapy Diagnosis: Low back pain with sciatica  Visit Dx:    ICD-10-CM ICD-9-CM   1. Chronic low back pain with right-sided sciatica, unspecified back pain laterality M54.41 724.2    G89.29 724.3     338.29       Patient Active Problem List   Diagnosis   • Type 2 diabetes mellitus   • Hyperlipidemia   • Essential hypertension   • Gastroesophageal reflux disease without esophagitis        Past Medical History:   Diagnosis Date   • Alpha galactosidase deficiency     red meat allergy; \"alpha gal syndrome\"   • Anemia    • Arthritis    • Asthma    • COPD (chronic obstructive pulmonary disease)    • Diabetes mellitus    • Endometriosis    • Fibromyalgia    • Goiter    • Hyperlipidemia    • Hypertension     takes bp meds to help kidneys   • Neuropathy    • Sleep apnea    • Tinnitus    • Vitamin D deficiency         Past Surgical History:   Procedure Laterality Date   • CARPAL TUNNEL RELEASE Bilateral    • LAPAROSCOPIC TUBAL LIGATION     • TUBAL ABDOMINAL LIGATION               PT Ortho       17 1700    Posture/Observations    Posture/Observations Comments rounded shlds, Gachristiano transfers  -BB    Special Tests/Palpation    Special Tests/Palpation --   very ttp R QL / SI and R Lumbar paraspinals  -BB    ROM (Range of Motion)    General ROM --   trunk ROM flex to knees with pain  -BB    Transfers    Transfer, Comment gaurded  -BB      User Key  (r) = Recorded By, (t) = Taken By, (c) = Cosigned By    Initials Name Provider Type    OSMEL Ponce PTA Physical Therapy Assistant                            PT Assessment/Plan       17 1820       PT Assessment    Assessment Comments " Did not increase wt or time with tration this date.  Continues with tenderness R LB.  No change to current HEP.  Increased pain after treatment.   -BB     PT Plan    PT Frequency 2x/week  -BB     Predicted Duration of Therapy Intervention (days/wks) x 4-6 weeks  -BB     PT Plan Comments MRI Results 9/14/17. Recheck next visit.  Assess pts need for continuing or discontinuing mechanical traction.    -BB       User Key  (r) = Recorded By, (t) = Taken By, (c) = Cosigned By    Initials Name Provider Type    OSMEL Ponce PTA Physical Therapy Assistant                Modalities       08/29/17 1700          Moist Heat    MH Applied Yes  -BB      Location low back prone with IFC  -BB      Rx Minutes 15 mins  -BB      MH S/P Rx Yes  -BB      ELECTRICAL STIMULATION    Attended/Unattended Unattended  -BB      Stimulation Type IFC  -BB      Location/Electrode Placement/Other Low Back with MHP  -BB      Rx Minutes 15 mins  -BB      Traction 07253    Traction Type Lumbar  -BB      Rx Minutes 10  -BB      Duration Intermittent  -BB      Position Supine  -BB      Weight 33   20% BW  -BB      Hold 60  -BB      Relax 20   10 lbs on relax  -BB      Progression 2  -BB      Regression 2  -BB        User Key  (r) = Recorded By, (t) = Taken By, (c) = Cosigned By    Initials Name Provider Type    OSMEL Ponce PTA Physical Therapy Assistant                Exercises       08/29/17 1700          Subjective Comments    Subjective Comments States she had signifiant increased pain the day after last treatment.  Not sure what increased pain.  No change in RLE symptoms.    Had Lumbar MRI last Thurs.   -BB      Subjective Pain    Able to rate subjective pain? yes  -BB      Pre-Treatment Pain Level 4  -BB      Post-Treatment Pain Level 6  -BB      Aquatics    Aquatics performed? No   unable due to open wound LLE  -BB      Exercise 1    Exercise Name 1 Hooklying trans abs  -BB      Sets 1 --  -BB      Reps 1 10  -BB      Exercise 2     Exercise Name 2 hooklying add sq  -BB      Reps 2 10  -BB      Time (Seconds) 2 5  -BB      Exercise 3    Exercise Name 3 supine piriformis stretch B  -BB      Reps 3 3  -BB      Time (Seconds) 3 30  -BB      Exercise 4    Exercise Name 4 --  -BB      Reps 4 --  -BB      Time (Seconds) 4 --  -BB        User Key  (r) = Recorded By, (t) = Taken By, (c) = Cosigned By    Initials Name Provider Type    OSMEL Ponce PTA Physical Therapy Assistant                        Manual Rx (last 36 hours)      Manual Treatments       08/29/17 1700          Manual Rx 1    Manual Rx 1 Location STM R Lumbar Paraspinals / QL  -BB      Manual Rx 1 Duration 10  -BB        User Key  (r) = Recorded By, (t) = Taken By, (c) = Cosigned By    Initials Name Provider Type    OSMEL Ponce PTA Physical Therapy Assistant                PT OP Goals       08/29/17 1700       PT Short Term Goals    STG Date to Achieve 08/28/17  -BB     STG 1 Note a >/= 50% subjective improvement in symptoms  -BB     STG 1 Progress Not Met  -BB     STG 2 Decrease Modified Oswestry score to </= 40%  -BB     STG 2 Progress Not Met  -BB     STG 3 Trunk flexion to fingertips to ankles  -BB     STG 3 Progress Not Met  -BB     STG 3 Progress Comments to knees  -BB     Long Term Goals    LTG Date to Achieve 09/18/17  -BB     LTG 1 Independent with HEP  -BB     LTG 1 Progress Not Met  -BB     LTG 2 Patient to note minimal LBP with trunk ROM  -BB     LTG 2 Progress Not Met  -BB     LTG 3 Return to PLOF  -BB     LTG 3 Progress Not Met  -BB     Time Calculation    PT Goal Re-Cert Due Date 08/28/17  -BB       User Key  (r) = Recorded By, (t) = Taken By, (c) = Cosigned By    Initials Name Provider Type    OSMEL Ponce PTA Physical Therapy Assistant                Therapy Education       08/29/17 1819          Therapy Education    Given HEP  -BB      Program Reinforced  -BB      How Provided Verbal;Demonstration  -BB      Provided to Patient  -BB      Level of  Understanding Verbalized;Demonstrated  -OSMEL        User Key  (r) = Recorded By, (t) = Taken By, (c) = Cosigned By    Initials Name Provider Type    OSMEL Ponce PTA Physical Therapy Assistant                Time Calculation:   Start Time: 1735  Stop Time: 1835  Time Calculation (min): 60 min  Total Timed Code Minutes- PT: 60 minute(s)    Therapy Charges for Today     Code Description Service Date Service Provider Modifiers Qty    08427836012 HC PT MANUAL THERAPY EA 15 MIN 8/29/2017 Torri Ponce, PTA GP 1    50269071639 HC PT THER PROC EA 15 MIN 8/29/2017 Torri Ponce, PTA GP 1    08229392183 HC PT TRACTION LUMBAR 8/29/2017 Torri Ponce, PTA GP 1    23497464707 HC PT ELECTRICAL STIM UNATTENDED 8/29/2017 Torri Ponce PTA  1                    Torri Ponce PTA  8/29/2017

## 2017-08-31 ENCOUNTER — HOSPITAL ENCOUNTER (OUTPATIENT)
Dept: PHYSICAL THERAPY | Facility: HOSPITAL | Age: 57
Setting detail: THERAPIES SERIES
Discharge: HOME | End: 2017-08-31

## 2017-08-31 DIAGNOSIS — G89.29 CHRONIC LOW BACK PAIN WITH RIGHT-SIDED SCIATICA, UNSPECIFIED BACK PAIN LATERALITY: Primary | ICD-10-CM

## 2017-08-31 DIAGNOSIS — M54.41 CHRONIC LOW BACK PAIN WITH RIGHT-SIDED SCIATICA, UNSPECIFIED BACK PAIN LATERALITY: Primary | ICD-10-CM

## 2017-08-31 PROCEDURE — 97140 MANUAL THERAPY 1/> REGIONS: CPT | Performed by: PHYSICAL THERAPIST

## 2017-08-31 PROCEDURE — 97110 THERAPEUTIC EXERCISES: CPT | Performed by: PHYSICAL THERAPIST

## 2017-08-31 PROCEDURE — 97113 AQUATIC THERAPY/EXERCISES: CPT | Performed by: PHYSICAL THERAPIST

## 2017-09-05 ENCOUNTER — HOSPITAL ENCOUNTER (OUTPATIENT)
Dept: PHYSICAL THERAPY | Facility: HOSPITAL | Age: 57
Setting detail: THERAPIES SERIES
Discharge: HOME | End: 2017-09-05

## 2017-09-05 DIAGNOSIS — M54.41 CHRONIC LOW BACK PAIN WITH RIGHT-SIDED SCIATICA, UNSPECIFIED BACK PAIN LATERALITY: Primary | ICD-10-CM

## 2017-09-05 DIAGNOSIS — G89.29 CHRONIC LOW BACK PAIN WITH RIGHT-SIDED SCIATICA, UNSPECIFIED BACK PAIN LATERALITY: Primary | ICD-10-CM

## 2017-09-05 PROCEDURE — 97110 THERAPEUTIC EXERCISES: CPT

## 2017-09-07 ENCOUNTER — HOSPITAL ENCOUNTER (OUTPATIENT)
Dept: PHYSICAL THERAPY | Facility: HOSPITAL | Age: 57
Setting detail: THERAPIES SERIES
Discharge: HOME | End: 2017-09-07

## 2017-09-07 DIAGNOSIS — G89.29 CHRONIC LOW BACK PAIN WITH RIGHT-SIDED SCIATICA, UNSPECIFIED BACK PAIN LATERALITY: Primary | ICD-10-CM

## 2017-09-07 DIAGNOSIS — M54.41 CHRONIC LOW BACK PAIN WITH RIGHT-SIDED SCIATICA, UNSPECIFIED BACK PAIN LATERALITY: Primary | ICD-10-CM

## 2017-09-07 PROCEDURE — 97110 THERAPEUTIC EXERCISES: CPT

## 2017-09-07 NOTE — THERAPY TREATMENT NOTE
"    Outpatient Physical Therapy Ortho Treatment Note  [facility]     Patient Name: Brittni Hagen  : 1960  MRN: [id number]  Today's Date: 2017      Visit Date: 2017  Subjective Improvement: 0%  Visit Number:     Recert Date:   17  MD Visit:   17  Total Approved Visits:  75 per year    Visit Dx:    ICD-10-CM ICD-9-CM   1. Chronic low back pain with right-sided sciatica, unspecified back pain laterality M54.41 724.2    G89.29 724.3     338.29       Patient Active Problem List   Diagnosis   • Type 2 diabetes mellitus   • Hyperlipidemia   • Essential hypertension   • Gastroesophageal reflux disease without esophagitis        Past Medical History:   Diagnosis Date   • Alpha galactosidase deficiency     red meat allergy; \"alpha gal syndrome\"   • Anemia    • Arthritis    • Asthma    • COPD (chronic obstructive pulmonary disease)    • Diabetes mellitus    • Endometriosis    • Fibromyalgia    • Goiter    • Hyperlipidemia    • Hypertension     takes bp meds to help kidneys   • Neuropathy    • Sleep apnea    • Tinnitus    • Vitamin D deficiency         Past Surgical History:   Procedure Laterality Date   • CARPAL TUNNEL RELEASE Bilateral    • LAPAROSCOPIC TUBAL LIGATION     • TUBAL ABDOMINAL LIGATION               PT Ortho       17 1700    Precautions and Contraindications    Precautions cannot swim  -BB    Transfers    Transfer, Comment No AD, Slow transfers  -BB      User Key  (r) = Recorded By, (t) = Taken By, (c) = Cosigned By    Initials Name Provider Type    OSMEL Ponce, PTA Physical Therapy Assistant                            PT Assessment/Plan       17 1820       PT Assessment    Assessment Comments No increased pain after pool.   -BB     PT Plan    PT Frequency 2x/week  -BB     Predicted Duration of Therapy Intervention (days/wks) x 4 weeks  -BB     PT Plan Comments resume land next week  -BB       User Key  (r) = Recorded By, (t) = Taken By, (c) = " Cosigned By    Initials Name Provider Type    OSMEL Ponce PTA Physical Therapy Assistant                    Exercises       09/07/17 1700          Subjective Comments    Subjective Comments Not feeling to bad today.  -BB      Subjective Pain    Able to rate subjective pain? yes  -BB      Pre-Treatment Pain Level 5  -BB      Post-Treatment Pain Level 5  -BB      Aquatics    Aquatics performed? Yes  -BB      Exercise 1    Exercise Name 1 AQU 3 WAY WALK  -BB      Time (Minutes) 1 5 EA   -BB      Exercise 2    Exercise Name 2 AQU CR/TR  -BB      Sets 2 2  -BB      Reps 2 10  -BB      Exercise 3    Exercise Name 3 AQU MS  -BB      Sets 3 2  -BB      Reps 3 10  -BB      Exercise 4    Exercise Name 4 AQU SLR 3 WAY   -BB      Reps 4 10  -BB      Exercise 5    Exercise Name 5 AQU DEEP BIKE  -BB      Time (Minutes) 5 3  -BB      Exercise 6    Exercise Name 6 AQU DEEP HANG   -BB      Time (Minutes) 6 10  -BB        User Key  (r) = Recorded By, (t) = Taken By, (c) = Cosigned By    Initials Name Provider Type    OSMEL Ponce PTA Physical Therapy Assistant                               PT OP Goals       09/07/17 1700       PT Short Term Goals    STG Date to Achieve 09/21/17  -BB     STG 1 Note a >/= 50% subjective improvement in symptoms  -BB     STG 1 Progress Not Met  -BB     STG 2 Decrease Modified Oswestry score to </= 40%  -BB     STG 2 Progress Not Met  -BB     STG 3 Trunk flexion to fingertips to ankles  -BB     STG 3 Progress Not Met  -BB     Long Term Goals    LTG Date to Achieve 09/28/17  -BB     LTG 1 Independent with HEP  -BB     LTG 1 Progress Not Met  -BB     LTG 2 Patient to note minimal LBP with trunk ROM  -BB     LTG 2 Progress Not Met  -BB     LTG 3 Return to PLOF  -BB     LTG 3 Progress Not Met  -BB     Time Calculation    PT Goal Re-Cert Due Date 09/28/17  -BB       User Key  (r) = Recorded By, (t) = Taken By, (c) = Cosigned By    Initials Name Provider Type    OSMEL Ponce PTA  Physical Therapy Assistant                Therapy Education       09/07/17 1815          Therapy Education    Given HEP  -BB      Program Reinforced  -BB      Level of Understanding Verbalized  -BB        User Key  (r) = Recorded By, (t) = Taken By, (c) = Cosigned By    Initials Name Provider Type    OSMEL Ponce PTA Physical Therapy Assistant                Time Calculation:   Start Time: 1733  Stop Time: 1815  Time Calculation (min): 42 min  Total Timed Code Minutes- PT: 42 minute(s)    Therapy Charges for Today     Code Description Service Date Service Provider Modifiers Qty    27966496836 HC PT THER PROC EA 15 MIN 9/7/2017 Torri Ponce PTA GP 3                    Torri Ponce PTA  9/7/2017

## 2017-09-12 ENCOUNTER — HOSPITAL ENCOUNTER (OUTPATIENT)
Dept: PHYSICAL THERAPY | Facility: HOSPITAL | Age: 57
Setting detail: THERAPIES SERIES
Discharge: HOME | End: 2017-09-12

## 2017-09-12 DIAGNOSIS — G89.29 CHRONIC LOW BACK PAIN WITH RIGHT-SIDED SCIATICA, UNSPECIFIED BACK PAIN LATERALITY: Primary | ICD-10-CM

## 2017-09-12 DIAGNOSIS — M54.41 CHRONIC LOW BACK PAIN WITH RIGHT-SIDED SCIATICA, UNSPECIFIED BACK PAIN LATERALITY: Primary | ICD-10-CM

## 2017-09-12 PROCEDURE — 97110 THERAPEUTIC EXERCISES: CPT

## 2017-09-12 NOTE — THERAPY TREATMENT NOTE
"    Outpatient Physical Therapy Ortho Treatment Note  [facility]     Patient Name: rBittni Hagen  : 1960  MRN: [id number]  Today's Date: 2017      Visit Date: 2017     Subjective improvement 0  Visits 8/8  Visits approved med [facility]  RTMD 2017 for mri results  recert date 2017    Low back pain    Visit Dx:    ICD-10-CM ICD-9-CM   1. Chronic low back pain with right-sided sciatica, unspecified back pain laterality M54.41 724.2    G89.29 724.3     338.29       Patient Active Problem List   Diagnosis   • Type 2 diabetes mellitus   • Hyperlipidemia   • Essential hypertension   • Gastroesophageal reflux disease without esophagitis        Past Medical History:   Diagnosis Date   • Alpha galactosidase deficiency     red meat allergy; \"alpha gal syndrome\"   • Anemia    • Arthritis    • Asthma    • COPD (chronic obstructive pulmonary disease)    • Diabetes mellitus    • Endometriosis    • Fibromyalgia    • Goiter    • Hyperlipidemia    • Hypertension     takes bp meds to help kidneys   • Neuropathy    • Sleep apnea    • Tinnitus    • Vitamin D deficiency         Past Surgical History:   Procedure Laterality Date   • CARPAL TUNNEL RELEASE Bilateral    • LAPAROSCOPIC TUBAL LIGATION     • TUBAL ABDOMINAL LIGATION                               PT Assessment/Plan       17 1707       PT Assessment    Assessment Comments patients pain decrease to 3 or 4/10  -CP     PT Plan    PT Frequency 2x/week  -CP     Predicted Duration of Therapy Intervention (days/wks) 4 weeks  -CP     PT Plan Comments cont with poc  -CP       User Key  (r) = Recorded By, (t) = Taken By, (c) = Cosigned By    Initials Name Provider Type    CP Rachel Zamora PTA Physical Therapy Assistant                    Exercises       17 1600          Subjective Comments    Subjective Comments patient has had back pain for about 3 months.  does not remember doing to cuase the pain excepts being in the hospital for " 3 days  -CP      Subjective Pain    Able to rate subjective pain? yes  -CP      Pre-Treatment Pain Level 8  -CP      Post-Treatment Pain Level 6  -CP      Aquatics    Aquatics performed? Yes  -CP      Exercise 1    Exercise Name 1 aqu; walk 3 way  -CP      Time (Minutes) 1 5 minutes each  -CP      Exercise 2    Exercise Name 2 aqu; cr/tr  -CP      Sets 2 2  -CP      Reps 2 10  -CP      Exercise 3    Exercise Name 3 aqu; mini squats  -CP      Sets 3 2  -CP      Reps 3 10  -CP      Exercise 4    Exercise Name 4 au; core stab shoulder 3 way  -CP      Sets 4 2  -CP      Reps 4 10  -CP      Exercise 5    Exercise Name 5 aqu; hip 3 way  -CP      Reps 5 15  -CP      Time (Minutes) 5 bilateral  -CP      Exercise 6    Exercise Name 6 aqu; deep hang  -CP      Time (Minutes) 6 12  -CP        User Key  (r) = Recorded By, (t) = Taken By, (c) = Cosigned By    Initials Name Provider Type    CP Rachel Zamora PTA Physical Therapy Assistant                               PT OP Goals       09/12/17 1600       PT Short Term Goals    STG Date to Achieve 09/21/17  -CP     STG 1 Note a >/= 50% subjective improvement in symptoms  -CP     STG 1 Progress Not Met  -CP     STG 2 Decrease Modified Oswestry score to </= 40%  -CP     STG 2 Progress Not Met  -CP     STG 3 Trunk flexion to fingertips to ankles  -CP     STG 3 Progress Not Met  -CP     Long Term Goals    LTG Date to Achieve 09/28/17  -CP     LTG 1 Independent with HEP  -CP     LTG 1 Progress Not Met  -CP     LTG 2 Patient to note minimal LBP with trunk ROM  -CP     LTG 2 Progress Not Met  -CP     LTG 3 Return to PLOF  -CP     LTG 3 Progress Not Met  -CP     Time Calculation    PT Goal Re-Cert Due Date 09/28/17  -CP       User Key  (r) = Recorded By, (t) = Taken By, (c) = Cosigned By    Initials Name Provider Type    CP Rachel Zamora PTA Physical Therapy Assistant                Therapy Education       09/12/17 1654          Therapy Education    Education Details cont with  current hep  -CP      Given HEP  -CP      Program Reinforced  -CP      How Provided Verbal  -CP      Provided to Patient  -CP      Level of Understanding Verbalized  -CP        User Key  (r) = Recorded By, (t) = Taken By, (c) = Cosigned By    Initials Name Provider Type    CP Rachel Zamora PTA Physical Therapy Assistant                Time Calculation:   Start Time: 1620  Stop Time: 1710  Time Calculation (min): 50 min  Total Timed Code Minutes- PT: 50 minute(s)    Therapy Charges for Today     Code Description Service Date Service Provider Modifiers Qty    26995286514 HC PT THER PROC EA 15 MIN 9/12/2017 Rachel Zamora PTA GP 3                    Rachel Zamora PTA  9/12/2017

## 2017-09-14 ENCOUNTER — HOSPITAL ENCOUNTER (OUTPATIENT)
Dept: PHYSICAL THERAPY | Facility: HOSPITAL | Age: 57
Setting detail: THERAPIES SERIES
End: 2017-09-14

## 2017-09-19 ENCOUNTER — HOSPITAL ENCOUNTER (OUTPATIENT)
Dept: PHYSICAL THERAPY | Facility: HOSPITAL | Age: 57
Setting detail: THERAPIES SERIES
Discharge: HOME | End: 2017-09-19

## 2017-09-19 DIAGNOSIS — G89.29 CHRONIC LOW BACK PAIN WITH RIGHT-SIDED SCIATICA, UNSPECIFIED BACK PAIN LATERALITY: Primary | ICD-10-CM

## 2017-09-19 DIAGNOSIS — M54.41 CHRONIC LOW BACK PAIN WITH RIGHT-SIDED SCIATICA, UNSPECIFIED BACK PAIN LATERALITY: Primary | ICD-10-CM

## 2017-09-19 PROCEDURE — G0283 ELEC STIM OTHER THAN WOUND: HCPCS

## 2017-09-19 NOTE — THERAPY TREATMENT NOTE
"    Outpatient Physical Therapy Ortho Treatment Note  [facility]     Patient Name: Brittni Hagen  : 1960  MRN: [id number]  Today's Date: 2017      Visit Date: 2017     Subjective Improvement 0  Visits   Visits approved med [facility]  RTMD 2017 for MRI Results  Recert Date 2017    Low back pain        Visit Dx:    ICD-10-CM ICD-9-CM   1. Chronic low back pain with right-sided sciatica, unspecified back pain laterality M54.41 724.2    G89.29 724.3     338.29       Patient Active Problem List   Diagnosis   • Type 2 diabetes mellitus   • Hyperlipidemia   • Essential hypertension   • Gastroesophageal reflux disease without esophagitis        Past Medical History:   Diagnosis Date   • Alpha galactosidase deficiency     red meat allergy; \"alpha gal syndrome\"   • Anemia    • Arthritis    • Asthma    • COPD (chronic obstructive pulmonary disease)    • Diabetes mellitus    • Endometriosis    • Fibromyalgia    • Goiter    • Hyperlipidemia    • Hypertension     takes bp meds to help kidneys   • Neuropathy    • Sleep apnea    • Tinnitus    • Vitamin D deficiency         Past Surgical History:   Procedure Laterality Date   • CARPAL TUNNEL RELEASE Bilateral    • LAPAROSCOPIC TUBAL LIGATION     • TUBAL ABDOMINAL LIGATION               PT Ortho       17 1700    Subjective Comments    Subjective Comments Patient RTMD on this Thursday and will get her MRI result.    -CP    Subjective Pain    Able to rate subjective pain? yes  -CP    Pre-Treatment Pain Level 5  -CP    Gait Assessment/Treatment    Gait, [place] Level independent  -CP    Gait, Gait Deviations antalgic  -CP      User Key  (r) = Recorded By, (t) = Taken By, (c) = Cosigned By    Initials Name Provider Type    CP Rachel Zamora PTA Physical Therapy Assistant                            PT Assessment/Plan       17 171       PT Assessment    Assessment Comments The pool was out of order today.  Patient didnt want " to perform land ex as they increase her pain.. Patient decided on IFC with MHP  -CP     PT Plan    PT Frequency 2x/week  -CP     Predicted Duration of Therapy Intervention (days/wks) 4 weeks  -CP     PT Plan Comments aquatics next visit  -CP       User Key  (r) = Recorded By, (t) = Taken By, (c) = Cosigned By    Initials Name Provider Type    CP Rachel Zamora PTA Physical Therapy Assistant                Modalities       09/19/17 1700          Subjective Pain    Post-Treatment Pain Level 3  -CP      Moist Heat    MH Applied Yes  -CP      Location low back in prone  -CP      Rx Minutes --   20 minutes with IFC  -CP      MH S/P Rx Yes  -CP      ELECTRICAL STIMULATION    Attended/Unattended Unattended  -CP      Stimulation Type IFC  -CP      Location/Electrode Placement/Other low back in prone  -CP      Rx Minutes 20 mins  -CP        User Key  (r) = Recorded By, (t) = Taken By, (c) = Cosigned By    Initials Name Provider Type    CP Rachel Zamora PTA Physical Therapy Assistant                Exercises       09/19/17 1700          Subjective Comments    Subjective Comments Patient RTMD on this Thursday and will get her MRI result.    -CP      Subjective Pain    Able to rate subjective pain? yes  -CP      Pre-Treatment Pain Level 5  -CP      Post-Treatment Pain Level 3  -CP        User Key  (r) = Recorded By, (t) = Taken By, (c) = Cosigned By    Initials Name Provider Type    CP Rachel Zamora PTA Physical Therapy Assistant                               PT OP Goals       09/19/17 1700       PT Short Term Goals    STG Date to Achieve 09/21/17  -CP     STG 1 Note a >/= 50% subjective improvement in symptoms  -CP     STG 1 Progress Not Met  -CP     STG 2 Decrease Modified Oswestry score to </= 40%  -CP     STG 2 Progress Not Met  -CP     STG 3 Trunk flexion to fingertips to ankles  -CP     STG 3 Progress Not Met  -CP     Long Term Goals    LTG Date to Achieve 09/28/17  -CP     LTG 1 Independent with HEP  -CP      LTG 1 Progress Not Met  -CP     LTG 2 Patient to note minimal LBP with trunk ROM  -CP     LTG 2 Progress Not Met  -CP     LTG 3 Return to PLOF  -CP     LTG 3 Progress Not Met  -CP     Time Calculation    PT Goal Re-Cert Due Date 09/28/17  -CP       User Key  (r) = Recorded By, (t) = Taken By, (c) = Cosigned By    Initials Name Provider Type    CP Rachel Zamora PTA Physical Therapy Assistant                Therapy Education       09/19/17 1715          Therapy Education    Given Pain management  -CP      Program Reinforced  -CP      How Provided Verbal  -CP      Provided to Patient  -CP      Level of Understanding Verbalized  -CP        User Key  (r) = Recorded By, (t) = Taken By, (c) = Cosigned By    Initials Name Provider Type    CP Rachel Zamora PTA Physical Therapy Assistant                Time Calculation:   Start Time: 1650  Stop Time: 1720  Time Calculation (min): 30 min  Total Timed Code Minutes- PT: 30 minute(s)    Therapy Charges for Today     Code Description Service Date Service Provider Modifiers Qty    06584441518 HC PT ELECTRICAL STIM UNATTENDED 9/19/2017 Rachel Zamora PTA  1    40780074489 HC PT THER SUPP EA 15 MIN 9/19/2017 Rachel Zamora PTA GP 1                    Rachel Zamora PTA  9/19/2017

## 2017-09-21 ENCOUNTER — HOSPITAL ENCOUNTER (OUTPATIENT)
Dept: PHYSICAL THERAPY | Facility: HOSPITAL | Age: 57
Setting detail: THERAPIES SERIES
Discharge: HOME | End: 2017-09-21

## 2017-09-21 DIAGNOSIS — M54.41 CHRONIC LOW BACK PAIN WITH RIGHT-SIDED SCIATICA, UNSPECIFIED BACK PAIN LATERALITY: Primary | ICD-10-CM

## 2017-09-21 DIAGNOSIS — G89.29 CHRONIC LOW BACK PAIN WITH RIGHT-SIDED SCIATICA, UNSPECIFIED BACK PAIN LATERALITY: Primary | ICD-10-CM

## 2017-09-21 PROCEDURE — G0283 ELEC STIM OTHER THAN WOUND: HCPCS

## 2017-09-21 PROCEDURE — 97110 THERAPEUTIC EXERCISES: CPT

## 2017-09-21 NOTE — THERAPY TREATMENT NOTE
"    Outpatient Physical Therapy Ortho Treatment Note  [facility]     Patient Name: Brittni Hagen  : 1960  MRN: [id number]  Today's Date: 2017      Visit Date: 2017    Subjective Improvement 0  Visits 10/15  Visits approved Med Nec  RTMD 2017  Recert Date 2017    Low Back Pain    Visit Dx:    ICD-10-CM ICD-9-CM   1. Chronic low back pain with right-sided sciatica, unspecified back pain laterality M54.41 724.2    G89.29 724.3     338.29       Patient Active Problem List   Diagnosis   • Type 2 diabetes mellitus   • Hyperlipidemia   • Essential hypertension   • Gastroesophageal reflux disease without esophagitis        Past Medical History:   Diagnosis Date   • Alpha galactosidase deficiency     red meat allergy; \"alpha gal syndrome\"   • Anemia    • Arthritis    • Asthma    • COPD (chronic obstructive pulmonary disease)    • Diabetes mellitus    • Endometriosis    • Fibromyalgia    • Goiter    • Hyperlipidemia    • Hypertension     takes bp meds to help kidneys   • Neuropathy    • Sleep apnea    • Tinnitus    • Vitamin D deficiency         Past Surgical History:   Procedure Laterality Date   • CARPAL TUNNEL RELEASE Bilateral    • LAPAROSCOPIC TUBAL LIGATION     • TUBAL ABDOMINAL LIGATION               PT Ortho       17 1700    Subjective Comments    Subjective Comments Patient RTMD on this Thursday and will get her MRI result.    -CP    Subjective Pain    Able to rate subjective pain? yes  -CP    Pre-Treatment Pain Level 5  -CP    Gait Assessment/Treatment    Gait, Ellis Level independent  -CP    Gait, Gait Deviations antalgic  -CP      User Key  (r) = Recorded By, (t) = Taken By, (c) = Cosigned By    Initials Name Provider Type    CP Rachel Zamora PTA Physical Therapy Assistant                            PT Assessment/Plan       17 1740       PT Assessment    Assessment Comments patient cont to voice little to no improvment with therapy thus far.  " She will receive her MRI reslults next week.  -CP     PT Plan    PT Frequency 2x/week  -CP     Predicted Duration of Therapy Intervention (days/wks) 4 weeks  -CP     PT Plan Comments Recheck next week.  Progress as tolerated  -CP       User Key  (r) = Recorded By, (t) = Taken By, (c) = Cosigned By    Initials Name Provider Type    CP Rachel Zamora PTA Physical Therapy Assistant                Modalities       09/21/17 1700          Moist Heat    MH Applied Yes  -CP      Location low back in prone  -CP      Rx Minutes --   20 minutes with IFC  -CP      MH S/P Rx Yes  -CP      ELECTRICAL STIMULATION    Attended/Unattended Unattended  -CP      Stimulation Type IFC  -CP      Location/Electrode Placement/Other low back in prove  -CP      Rx Minutes 20 mins  -CP        User Key  (r) = Recorded By, (t) = Taken By, (c) = Cosigned By    Initials Name Provider Type    CP Rachel Zamora PTA Physical Therapy Assistant                Exercises       09/21/17 1700          Subjective Comments    Subjective Comments patient states that deep hanging and the electrical stim seems to help the most.  -CP      Subjective Pain    Able to rate subjective pain? yes  -CP      Pre-Treatment Pain Level 6  -CP      Post-Treatment Pain Level 6  -CP      Aquatics    Aquatics performed? Yes  -CP      Exercise 1    Exercise Name 1 AQU; walk 3 way  -CP      Time (Minutes) 1 5 minutes each  -CP      Exercise 2    Exercise Name 2 aqu; cr/tr  -CP      Sets 2 2  -CP      Reps 2 10  -CP      Exercise 3    Exercise Name 3 aqu mini squats  -CP      Sets 3 2  -CP      Reps 3 10  -CP      Exercise 4    Exercise Name 4 aqu slr 3 way  -CP      Sets 4 2  -CP      Reps 4 10  -CP      Time (Seconds) 4 bilateral  -CP      Exercise 5    Exercise Name 5 AQU deep hang   -CP      Sets 5 --  -CP      Reps 5 --  -CP      Time (Minutes) 5 12  -CP      Exercise 6    Exercise Name 6 --  -CP      Time (Minutes) 6 --  -CP        User Key  (r) = Recorded By, (t) =  Taken By, (c) = Cosigned By    Initials Name Provider Type    CP Rachel Zamora PTA Physical Therapy Assistant                               PT OP Goals       09/21/17 1700       PT Short Term Goals    STG Date to Achieve 09/21/17  -CP     STG 1 Note a >/= 50% subjective improvement in symptoms  -CP     STG 1 Progress Not Met  -CP     STG 2 Decrease Modified Oswestry score to </= 40%  -CP     STG 2 Progress Not Met  -CP     STG 3 Trunk flexion to fingertips to ankles  -CP     STG 3 Progress Not Met  -CP     Long Term Goals    LTG Date to Achieve 09/28/17  -CP     LTG 1 Independent with HEP  -CP     LTG 1 Progress Ongoing  -CP     LTG 2 Patient to note minimal LBP with trunk ROM  -CP     LTG 2 Progress Not Met  -CP     LTG 3 Return to PLOF  -CP     LTG 3 Progress Not Met  -CP     Time Calculation    PT Goal Re-Cert Due Date 09/28/17  -CP       User Key  (r) = Recorded By, (t) = Taken By, (c) = Cosigned By    Initials Name Provider Type    CP Rachel Zamora PTA Physical Therapy Assistant                    Time Calculation:   Start Time: 1650  Stop Time: 1758  Time Calculation (min): 68 min  Total Timed Code Minutes- PT: 60 minute(s)    Therapy Charges for Today     Code Description Service Date Service Provider Modifiers Qty    15913748137 HC PT THER PROC EA 15 MIN 9/21/2017 Rachel Zamora PTA GP 3    78917739188 HC PT ELECTRICAL STIM UNATTENDED 9/21/2017 Rachel Zamora PTA  1    44789229198 HC PT THER SUPP EA 15 MIN 9/21/2017 Rachel Zamora PTA GP 1                    Rachel Zamora PTA  9/21/2017

## 2017-09-26 ENCOUNTER — APPOINTMENT (OUTPATIENT)
Dept: PHYSICAL THERAPY | Facility: HOSPITAL | Age: 57
End: 2017-09-26

## 2017-09-27 ENCOUNTER — HOSPITAL ENCOUNTER (OUTPATIENT)
Dept: PHYSICAL THERAPY | Facility: HOSPITAL | Age: 57
Setting detail: THERAPIES SERIES
Discharge: HOME | End: 2017-09-27

## 2017-09-27 DIAGNOSIS — M54.41 CHRONIC LOW BACK PAIN WITH RIGHT-SIDED SCIATICA, UNSPECIFIED BACK PAIN LATERALITY: Primary | ICD-10-CM

## 2017-09-27 DIAGNOSIS — G89.29 CHRONIC LOW BACK PAIN WITH RIGHT-SIDED SCIATICA, UNSPECIFIED BACK PAIN LATERALITY: Primary | ICD-10-CM

## 2017-09-27 PROCEDURE — 97140 MANUAL THERAPY 1/> REGIONS: CPT | Performed by: PHYSICAL THERAPIST

## 2017-09-27 PROCEDURE — G0283 ELEC STIM OTHER THAN WOUND: HCPCS | Performed by: PHYSICAL THERAPIST

## 2017-09-28 ENCOUNTER — APPOINTMENT (OUTPATIENT)
Dept: PHYSICAL THERAPY | Facility: HOSPITAL | Age: 57
End: 2017-09-28

## 2017-09-28 NOTE — THERAPY PROGRESS REPORT/RE-CERT
"    Outpatient Physical Therapy Ortho Progress Note  [facility]     Patient Name: Brittni Hagen  : 1960  MRN: [id number]  Today's Date: 2017      Visit Date: 2017  Attendance:  (75 visits/yr)  Subjective Improvement: 20%  Next MD Appt: 17  Recert Date: 10/18/17    Therapy Diagnosis: Low back pain with sciatica    Patient Active Problem List   Diagnosis   • Type 2 diabetes mellitus   • Hyperlipidemia   • Essential hypertension   • Gastroesophageal reflux disease without esophagitis        Past Medical History:   Diagnosis Date   • Alpha galactosidase deficiency     red meat allergy; \"alpha gal syndrome\"   • Anemia    • Arthritis    • Asthma    • COPD (chronic obstructive pulmonary disease)    • Diabetes mellitus    • Endometriosis    • Fibromyalgia    • Goiter    • Hyperlipidemia    • Hypertension     takes bp meds to help kidneys   • Neuropathy    • Sleep apnea    • Tinnitus    • Vitamin D deficiency         Past Surgical History:   Procedure Laterality Date   • CARPAL TUNNEL RELEASE Bilateral    • LAPAROSCOPIC TUBAL LIGATION     • TUBAL ABDOMINAL LIGATION         Visit Dx:     ICD-10-CM ICD-9-CM   1. Chronic low back pain with right-sided sciatica, unspecified back pain laterality M54.41 724.2    G89.29 724.3     338.29     Changes in Medications: none noted  Changes in MD Orders: none noted              PT Ortho       17 1500    Subjective Comments    Subjective Comments 20% subjective improvement. \"Severe pain on some days and some days are not so bad.\" No noticeable change in movement or activity. Continued numbness and tingling down the lateral R thigh into the anterior knee down the posterolateral calf to the plantar foot including 4th and 5th toes. Reports that the pain sometimes makes her go almost straight to bed once she gets home. Did not tolerate traction well. Aquatics and IFC estim with MHP has helped the most.  -SS    Precautions and " "Contraindications    Precautions cannot swim  -SS    Subjective Pain    Able to rate subjective pain? yes  -SS    Pre-Treatment Pain Level 4  -SS    Post-Treatment Pain Level 5  -SS    Posture/Observations    Cervical Lordosis Decreased  -SS    Rounded Shoulders Mild  -SS    Lumbar lordosis Increased  -SS    Posture/Observations Comments Anterior pelvic tilt. Increased pain in the low back when manually positioned into pelvic neutral.  -SS    Sensory Screen for Light Touch- Lower Quarter Clearing    L2 (anterior mid thigh) Right:;Diminished   paraesthesia  -SS    L3 (distal anterior thigh) Right:;Diminished   paraesthesia  -SS    L4 (medial lower leg/foot) Right:;Diminished   paraesthesia  -SS    L5 (lateral lower leg/great toe) Right:;Diminished   paraesthesia  -SS    S1 (bottom of foot) Right:;Diminished   paraesthesia  -SS    Lumbosacral Palpation    SI Right:;Tender   posterior rotation R hemipelvis  -SS    Lumbosacral Segment Bilateral:;Tender   R>L  -SS    Piriformis --   nontender  -SS    Gluteus Steve --   nontender  -SS    Quadratus Lumborum Right:;Tender  -SS    Lumbosacral Accessory Motions    Innominate rotation Right:  -SS    Lumbar/SI Special Tests    Slump Test (Neural Tension) Bilateral:;Positive   \"strain\" in low back and thigh  -SS    SLR (Neural Tension) Right:;Positive  -SS    SHERRIE (hip vs. SI Dysfunction) Bilateral:;Negative  -SS    Trunk    Flexion AROM Deficit fingertips to midshin with pain in B low back and down R LE  -SS    Extension AROM Deficit approx 50%; rotates R; pain R low back  -SS    Lt Lat Flexion AROM Deficit fingertips to mid-thigh; pain L LB/SI and pulling on R low back  -SS    Right Lateral Flexion AROM Deficit fingertips to knee lateral joint line; pain R low back  -SS    Left Hip    Hip Flexion Gross Movement (4+/5) good plus   LBP  -SS    Right Hip    Hip Flexion Gross Movement (4+/5) good plus   LBP  -SS    Left Knee    Knee Extension Gross Movement (5/5) normal   LBP  " -SS    Knee Flexion Gross Movement (5/5) normal   LBP  -SS    Right Knee    Knee Extension Gross Movement (5/5) normal   R leg pain  -SS    Knee Flexion Gross Movement (5/5) normal   R leg pain  -SS    Left Ankle/Foot    Ankle PF Gross Movement (5/5) normal  -SS    Ankle Dorsiflexion Gross Movement (5/5) normal  -SS    Right Ankle/Foot    Ankle PF Gross Movement (5/5) normal   paraesthesia R foot  -SS    Ankle Dorsiflexion Gross Movement (5/5) normal   paraesthesia R foot  -SS      User Key  (r) = Recorded By, (t) = Taken By, (c) = Cosigned By    Initials Name Provider Type    NATALIIA Denis, PT DPT Physical Therapist                            Therapy Education       09/27/17 1500          Therapy Education    Given Other (comment)   Plan to hold P.T.   -SS      How Provided Verbal  -SS      Provided to Patient  -SS      Level of Understanding Verbalized  -SS        User Key  (r) = Recorded By, (t) = Taken By, (c) = Cosigned By    Initials Name Provider Type     Chance Denis, PT DPT Physical Therapist                PT OP Goals       09/27/17 1500       PT Short Term Goals    STG Date to Achieve 09/21/17   further STGs deferred  -SS     STG 1 Note a >/= 50% subjective improvement in symptoms  -SS     STG 1 Progress Not Met  -SS     STG 2 Decrease Modified Oswestry score to </= 40%  -SS     STG 2 Progress Not Met  -SS     STG 3 Trunk flexion to fingertips to ankles  -SS     STG 3 Progress Not Met  -SS     Long Term Goals    LTG Date to Achieve 10/18/17  -SS     LTG 1 Independent with HEP  -SS     LTG 1 Progress Ongoing;Not Met  -SS     LTG 2 Patient to note minimal LBP with trunk ROM  -SS     LTG 2 Progress Not Met;Ongoing  -SS     LTG 3 Return to PLOF  -SS     LTG 3 Progress Not Met;Ongoing  -SS     Time Calculation    PT Goal Re-Cert Due Date 10/18/17  -SS       User Key  (r) = Recorded By, (t) = Taken By, (c) = Cosigned By    Initials Name Provider Type    NATALIIA Denis, PT DPT  Physical Therapist                PT Assessment/Plan       09/27/17 1500       PT Assessment    Functional Limitations Limitation in home management;Limitations in community activities;Performance in leisure activities;Performance in work activities  -     Impairments Pain;Posture;Range of motion;Muscle strength  -     Assessment Comments Patient not responding much to therapy. Unable to correct alignment with manual therapy. MD note sent with patient.  -     Rehab Potential Good   barrier: chronicity, not responding  -     Patient/caregiver participated in establishment of treatment plan and goals Yes  -     Patient would benefit from skilled therapy intervention --   uncertain  -     PT Plan    PT Frequency Other (comment)   TBA  -     PT Plan Comments Therapy is on hold at this time. Will continue if MD requests. Treatment, frequency, and duration will be set at that time.  -       User Key  (r) = Recorded By, (t) = Taken By, (c) = Cosigned By    Initials Name Provider Type     Chance Denis, PT DPT Physical Therapist                Modalities       09/27/17 1500          Moist Heat    Location low back in prone  -SS      Rx Minutes --   20 minutes with IF  -      MH S/P Rx Yes  -SS      ELECTRICAL STIMULATION    Attended/Unattended Unattended  -      Stimulation Type IFC  -      Location/Electrode Placement/Other low back in prove  -SS      Rx Minutes 20 mins  -SS        User Key  (r) = Recorded By, (t) = Taken By, (c) = Cosigned By    Initials Name Provider Type     Chance Denis, PT DPT Physical Therapist             Manual Rx (last 36 hours)      Manual Treatments       09/27/17 1500          Manual Rx 1    Manual Rx 1 Location lumbar spine  -SS      Manual Rx 1 Type joint mobilization  -SS      Manual Rx 1 Grade 3/4  -SS      Manual Rx 2    Manual Rx 2 Location SI posterior rotation  -SS      Manual Rx 2 Type ME  -SS      Manual Rx 3    Manual Rx 3 Location SI  -SS       Manual Rx 3 Type ME shotgun  -SS      Manual Rx 4    Manual Rx 4 Location R hemipelvis  -SS      Manual Rx 4 Type long-axis distraction through R LE  -SS        User Key  (r) = Recorded By, (t) = Taken By, (c) = Cosigned By    Initials Name Provider Type    NATALIIA Denis, PT DPT Physical Therapist                            Outcome Measures       09/27/17 1500          Modified Oswestry    Modified Oswestry Score/Comments 24/50 = 48%  -SS      Functional Assessment    Outcome Measure Options Modified Owestry  -SS        User Key  (r) = Recorded By, (t) = Taken By, (c) = Cosigned By    Initials Name Provider Type    NATALIIA Denis, PT DPT Physical Therapist            Time Calculation:   Start Time: 1520  Stop Time: 1640  Time Calculation (min): 80 min     Therapy Charges for Today     Code Description Service Date Service Provider Modifiers Qty    81039468341 HC PT THER SUPP EA 15 MIN 9/27/2017 Chance Denis, PT DPT GP 1    87919119771 HC PT ELECTRICAL STIM UNATTENDED 9/27/2017 Chance Denis PT DPT  1    27297013647 HC PT MANUAL THERAPY EA 15 MIN 9/27/2017 Chance Denis PT DPT GP 2                   Chance Denis, PT, DPT, CHT  9/27/2017

## 2017-12-10 ENCOUNTER — HOSPITAL ENCOUNTER (EMERGENCY)
Facility: HOSPITAL | Age: 57
Discharge: ANOTHER HOSPITAL | End: 2017-12-10
Attending: EMERGENCY MEDICINE | Admitting: EMERGENCY MEDICINE

## 2017-12-10 ENCOUNTER — APPOINTMENT (OUTPATIENT)
Dept: CT IMAGING | Facility: HOSPITAL | Age: 57
End: 2017-12-10

## 2017-12-10 VITALS
DIASTOLIC BLOOD PRESSURE: 87 MMHG | HEIGHT: 65 IN | SYSTOLIC BLOOD PRESSURE: 144 MMHG | TEMPERATURE: 98.1 F | WEIGHT: 160 LBS | BODY MASS INDEX: 26.66 KG/M2 | HEART RATE: 108 BPM | RESPIRATION RATE: 18 BRPM | OXYGEN SATURATION: 94 %

## 2017-12-10 DIAGNOSIS — R56.9 SEIZURE (HCC): Primary | ICD-10-CM

## 2017-12-10 LAB
ALBUMIN SERPL-MCNC: 3.5 G/DL (ref 3.4–4.8)
ALBUMIN/GLOB SERPL: 1.2 G/DL (ref 1.1–1.8)
ALP SERPL-CCNC: 48 U/L (ref 38–126)
ALT SERPL W P-5'-P-CCNC: 33 U/L (ref 9–52)
AMPHET+METHAMPHET UR QL: NEGATIVE
ANION GAP SERPL CALCULATED.3IONS-SCNC: 11 MMOL/L (ref 5–15)
AST SERPL-CCNC: 32 U/L (ref 14–36)
BACTERIA UR QL AUTO: ABNORMAL /HPF
BARBITURATES UR QL SCN: NEGATIVE
BASOPHILS # BLD AUTO: 0.01 10*3/MM3 (ref 0–0.2)
BASOPHILS NFR BLD AUTO: 0.1 % (ref 0–2)
BENZODIAZ UR QL SCN: NEGATIVE
BILIRUB SERPL-MCNC: 0.5 MG/DL (ref 0.2–1.3)
BILIRUB UR QL STRIP: NEGATIVE
BUN BLD-MCNC: 8 MG/DL (ref 7–21)
BUN/CREAT SERPL: 11.1 (ref 7–25)
CALCIUM SPEC-SCNC: 9 MG/DL (ref 8.4–10.2)
CANNABINOIDS SERPL QL: NEGATIVE
CHLORIDE SERPL-SCNC: 96 MMOL/L (ref 95–110)
CLARITY UR: CLEAR
CO2 SERPL-SCNC: 27 MMOL/L (ref 22–31)
COCAINE UR QL: NEGATIVE
COLOR UR: YELLOW
CREAT BLD-MCNC: 0.72 MG/DL (ref 0.5–1)
DEPRECATED RDW RBC AUTO: 47.3 FL (ref 36.4–46.3)
EOSINOPHIL # BLD AUTO: 0.16 10*3/MM3 (ref 0–0.7)
EOSINOPHIL NFR BLD AUTO: 1.9 % (ref 0–7)
ERYTHROCYTE [DISTWIDTH] IN BLOOD BY AUTOMATED COUNT: 14.7 % (ref 11.5–14.5)
ETHANOL BLD-MCNC: <10 MG/DL (ref 0–10)
ETHANOL UR QL: <0.01 %
GFR SERPL CREATININE-BSD FRML MDRD: 84 ML/MIN/1.73 (ref 51–120)
GLOBULIN UR ELPH-MCNC: 2.9 GM/DL (ref 2.3–3.5)
GLUCOSE BLD-MCNC: 141 MG/DL (ref 60–100)
GLUCOSE BLDC GLUCOMTR-MCNC: 131 MG/DL (ref 70–130)
GLUCOSE UR STRIP-MCNC: NEGATIVE MG/DL
HCT VFR BLD AUTO: 34.6 % (ref 35–45)
HGB BLD-MCNC: 11.5 G/DL (ref 12–15.5)
HGB UR QL STRIP.AUTO: ABNORMAL
HOLD SPECIMEN: NORMAL
HOLD SPECIMEN: NORMAL
HYALINE CASTS UR QL AUTO: ABNORMAL /LPF
IMM GRANULOCYTES # BLD: 0.01 10*3/MM3 (ref 0–0.02)
IMM GRANULOCYTES NFR BLD: 0.1 % (ref 0–0.5)
INR PPP: 0.99 (ref 0.8–1.2)
KETONES UR QL STRIP: NEGATIVE
LEUKOCYTE ESTERASE UR QL STRIP.AUTO: NEGATIVE
LYMPHOCYTES # BLD AUTO: 1.21 10*3/MM3 (ref 0.6–4.2)
LYMPHOCYTES NFR BLD AUTO: 14.6 % (ref 10–50)
MCH RBC QN AUTO: 29 PG (ref 26.5–34)
MCHC RBC AUTO-ENTMCNC: 33.2 G/DL (ref 31.4–36)
MCV RBC AUTO: 87.4 FL (ref 80–98)
METHADONE UR QL SCN: NEGATIVE
MONOCYTES # BLD AUTO: 0.92 10*3/MM3 (ref 0–0.9)
MONOCYTES NFR BLD AUTO: 11.1 % (ref 0–12)
NEUTROPHILS # BLD AUTO: 5.95 10*3/MM3 (ref 2–8.6)
NEUTROPHILS NFR BLD AUTO: 72.2 % (ref 37–80)
NITRITE UR QL STRIP: NEGATIVE
OPIATES UR QL: POSITIVE
OXYCODONE UR QL SCN: POSITIVE
PH UR STRIP.AUTO: 7.5 [PH] (ref 5–9)
PLATELET # BLD AUTO: 227 10*3/MM3 (ref 150–450)
PMV BLD AUTO: 10.6 FL (ref 8–12)
POTASSIUM BLD-SCNC: 3.6 MMOL/L (ref 3.5–5.1)
PROT SERPL-MCNC: 6.4 G/DL (ref 6.3–8.6)
PROT UR QL STRIP: NEGATIVE
PROTHROMBIN TIME: 13 SECONDS (ref 11.1–15.3)
RBC # BLD AUTO: 3.96 10*6/MM3 (ref 3.77–5.16)
RBC # UR: ABNORMAL /HPF
REF LAB TEST METHOD: ABNORMAL
SODIUM BLD-SCNC: 134 MMOL/L (ref 137–145)
SP GR UR STRIP: 1.01 (ref 1–1.03)
SQUAMOUS #/AREA URNS HPF: ABNORMAL /HPF
T4 SERPL-MCNC: 9.46 MCG/DL (ref 5.5–11)
TSH SERPL DL<=0.05 MIU/L-ACNC: 0.9 MIU/ML (ref 0.46–4.68)
UROBILINOGEN UR QL STRIP: ABNORMAL
WBC NRBC COR # BLD: 8.26 10*3/MM3 (ref 3.2–9.8)
WBC UR QL AUTO: ABNORMAL /HPF
WHOLE BLOOD HOLD SPECIMEN: NORMAL
WHOLE BLOOD HOLD SPECIMEN: NORMAL

## 2017-12-10 PROCEDURE — 80307 DRUG TEST PRSMV CHEM ANLYZR: CPT | Performed by: EMERGENCY MEDICINE

## 2017-12-10 PROCEDURE — 93005 ELECTROCARDIOGRAM TRACING: CPT | Performed by: EMERGENCY MEDICINE

## 2017-12-10 PROCEDURE — 84443 ASSAY THYROID STIM HORMONE: CPT | Performed by: EMERGENCY MEDICINE

## 2017-12-10 PROCEDURE — 82962 GLUCOSE BLOOD TEST: CPT

## 2017-12-10 PROCEDURE — 99285 EMERGENCY DEPT VISIT HI MDM: CPT

## 2017-12-10 PROCEDURE — 93010 ELECTROCARDIOGRAM REPORT: CPT | Performed by: INTERNAL MEDICINE

## 2017-12-10 PROCEDURE — 85025 COMPLETE CBC W/AUTO DIFF WBC: CPT | Performed by: EMERGENCY MEDICINE

## 2017-12-10 PROCEDURE — 84436 ASSAY OF TOTAL THYROXINE: CPT | Performed by: EMERGENCY MEDICINE

## 2017-12-10 PROCEDURE — 85610 PROTHROMBIN TIME: CPT | Performed by: EMERGENCY MEDICINE

## 2017-12-10 PROCEDURE — 80053 COMPREHEN METABOLIC PANEL: CPT | Performed by: EMERGENCY MEDICINE

## 2017-12-10 PROCEDURE — 70450 CT HEAD/BRAIN W/O DYE: CPT

## 2017-12-10 PROCEDURE — 81001 URINALYSIS AUTO W/SCOPE: CPT | Performed by: EMERGENCY MEDICINE

## 2017-12-10 RX ORDER — OXYCODONE AND ACETAMINOPHEN 10; 325 MG/1; MG/1
1 TABLET ORAL EVERY 4 HOURS PRN
COMMUNITY
End: 2019-03-12

## 2017-12-10 RX ORDER — SODIUM CHLORIDE 0.9 % (FLUSH) 0.9 %
10 SYRINGE (ML) INJECTION AS NEEDED
Status: DISCONTINUED | OUTPATIENT
Start: 2017-12-10 | End: 2017-12-10 | Stop reason: HOSPADM

## 2017-12-10 RX ORDER — ATORVASTATIN CALCIUM 20 MG/1
20 TABLET, FILM COATED ORAL NIGHTLY
COMMUNITY

## 2017-12-10 RX ORDER — GABAPENTIN 800 MG/1
800 TABLET ORAL 3 TIMES DAILY
COMMUNITY
End: 2020-08-05

## 2017-12-10 NOTE — ED PROVIDER NOTES
"Subjective   History of Present Illness  57-year-old female comes to the ED after a syncopal episode with questionable seizure activity.  She states that she was returning from the bathroom he became dizzy and then had a taste of vomiting in her mouth.  When she woke she urinated on herself.  She reports being very groggy initially upon waking and is still slightly groggy upon arrival to the ED.  No previous history of seizures.  She does report that she had a recent back surgery at [facility].  Seizure episode was witnessed by care taker.  Review of Systems   Constitutional: Negative for chills and fever.   HENT: Negative for rhinorrhea, sinus pressure and sneezing.    Eyes: Negative for pain and redness.   Respiratory: Negative for cough, chest tightness and shortness of breath.    Gastrointestinal: Negative for abdominal pain, diarrhea, nausea and vomiting.   Genitourinary: Negative for dysuria, flank pain, menstrual problem, pelvic pain, vaginal bleeding, vaginal discharge and vaginal pain.   Musculoskeletal:        Recent surgery with pain at the surgical site.   Skin: Negative for rash.   Neurological: Positive for seizures. Negative for dizziness, syncope, weakness, numbness and headaches.   Hematological: Negative.    Psychiatric/Behavioral: Negative for self-injury and suicidal ideas.       Past Medical History:   Diagnosis Date   • Alpha galactosidase deficiency     red meat allergy; \"alpha gal syndrome\"   • Anemia    • Arthritis    • Asthma    • Diabetes mellitus    • Endometriosis    • Fibromyalgia    • Goiter 2008   • Hyperlipidemia    • Hypertension     takes bp meds to help kidneys   • Neuropathy    • Sleep apnea    • Tinnitus    • Vitamin D deficiency        Allergies   Allergen Reactions   • Beef-Derived Products Hives     Red meats, Alpha-gal Syndrome    • Amoxicillin    • Grass Hives   • Green Soap Tincture Itching   • Promethazine      Gave her the shakes       Past Surgical " History:   Procedure Laterality Date   • CARPAL TUNNEL RELEASE Bilateral    • LAMINECTOMY  12/06/2017   • LAPAROSCOPIC TUBAL LIGATION     • TUBAL ABDOMINAL LIGATION         Family History   Problem Relation Age of Onset   • Heart disease Mother    • Heart attack Father    • Scoliosis Sister    • Heart disease Maternal Aunt        Social History     Social History   • Marital status:      Spouse name: N/A   • Number of children: N/A   • Years of education: N/A     Social History Main Topics   • Smoking status: Never Smoker   • Smokeless tobacco: Never Used   • Alcohol use No   • Drug use: No   • Sexual activity: Defer     Other Topics Concern   • None     Social History Narrative   • None           Objective   Physical Exam   Constitutional: She is oriented to person, place, and time. She appears well-developed and well-nourished.   HENT:   Head: Normocephalic and atraumatic.   Eyes: EOM are normal. Pupils are equal, round, and reactive to light.   Neck: Normal range of motion. Neck supple.   Cardiovascular: Normal rate, regular rhythm and normal heart sounds.    Pulmonary/Chest: Effort normal.   Abdominal: Soft. Bowel sounds are normal.   Musculoskeletal: Normal range of motion.   Neurological: She is alert and oriented to person, place, and time.   Skin: Skin is warm and dry.   Psychiatric: She has a normal mood and affect.   Nursing note and vitals reviewed.      ECG 12 Lead    Date/Time: 12/10/2017 11:33 AM  Performed by: JOSE BECKER  Authorized by: JOSE BECKER   Interpreted by physician  Comments: Sinus rate of 90.  Normal ND interval.  Normal QRS duration.  Normal QT/QTc interval.  No ST elevations or depressions.  No concerning findings on his EKG.  No STEMI               ED Course  ED Course        Labs Reviewed   COMPREHENSIVE METABOLIC PANEL - Abnormal; Notable for the following:        Result Value    Glucose 141 (*)     Sodium 134 (*)     All other components within normal  limits   URINALYSIS W/ CULTURE IF INDICATED - Abnormal; Notable for the following:     Blood, UA Small (1+) (*)     All other components within normal limits   CBC WITH AUTO DIFFERENTIAL - Abnormal; Notable for the following:     Hemoglobin 11.5 (*)     Hematocrit 34.6 (*)     RDW 14.7 (*)     RDW-SD 47.3 (*)     Monocytes, Absolute 0.92 (*)     All other components within normal limits   URINALYSIS, MICROSCOPIC ONLY - Abnormal; Notable for the following:     RBC, UA 6-12 (*)     All other components within normal limits   POCT GLUCOSE FINGERSTICK - Abnormal; Notable for the following:     Glucose 131 (*)     All other components within normal limits   PROTIME-INR - Normal    Narrative:     Therapeutic range for most indications is 2.0-3.0 INR,  or 2.5-3.5 for mechanical heart valves.   T4 - Normal    Narrative:     The concentration of Total T4 in samples from pregnant women is erroneously low (20%) when measured using the access Total T4 Assay.  Erroneously low results could mask hyperthyroidism.  Do not use the Access Total T4 assay as the only marker for evaluating pregnant patients for thyroid disorders.   TSH - Normal   RAINBOW DRAW    Narrative:     The following orders were created for panel order [place] Draw.  Procedure                               Abnormality         Status                     ---------                               -----------         ------                     Light Blue Top[[id number]]                                   Final result               Green Top (Gel)[[id number]]                                  Final result               Lavender Top[[id number]]                                     Final result               Gold Top - SST[[id number]]                                   Final result                 Please view results for these tests on the individual orders.   ETHANOL   URINE DRUG SCREEN   POCT GLUCOSE FINGERSTICK   CBC AND DIFFERENTIAL    Narrative:     The following orders were  created for panel order CBC & Differential.  Procedure                               Abnormality         Status                     ---------                               -----------         ------                     CBC Auto Differential[[id number]]        Abnormal            Final result                 Please view results for these tests on the individual orders.   LIGHT BLUE TOP   GREEN TOP   LAVENDER TOP   GOLD TOP - SST     CT Head Without Contrast   Final Result   Normal CT of the head.      Electronically signed by:  Moose Germain MD  12/10/2017 11:53 AM [facility]   Workstation: [id number]                  [facility]  Number of Diagnoses or Management Options  Seizure:   Diagnosis management comments: Patient with what sounds like a first time seizure today.  Witnessed by caretaker.. EKG not suggestive of cardiac etiology.  Workup for new onset seizure we'll likely get SOC consult.    [place] neurology believes the patient needs to have an MRI done.  We cannot get this done stated this facility in addition we do not have neurology here.  We'll transfer to [facility] for further evaluation.  I also discussed the case with the patient's neurologist in [place] as well as her neurosurgery nurse also in [place].  Because the neurosurgeon will not be available this week we will transfer the patient to [facility].      Final diagnoses:   Seizure            Rubén Baxter MD  12/10/17 0022

## 2018-01-24 ENCOUNTER — DOCUMENTATION (OUTPATIENT)
Dept: PHYSICAL THERAPY | Facility: HOSPITAL | Age: 58
End: 2018-01-24

## 2018-01-24 DIAGNOSIS — G89.29 CHRONIC LOW BACK PAIN WITH RIGHT-SIDED SCIATICA, UNSPECIFIED BACK PAIN LATERALITY: Primary | ICD-10-CM

## 2018-01-24 DIAGNOSIS — M54.41 CHRONIC LOW BACK PAIN WITH RIGHT-SIDED SCIATICA, UNSPECIFIED BACK PAIN LATERALITY: Primary | ICD-10-CM

## 2018-01-30 ENCOUNTER — TRANSCRIBE ORDERS (OUTPATIENT)
Dept: PHYSICAL THERAPY | Facility: HOSPITAL | Age: 58
End: 2018-01-30

## 2018-01-30 ENCOUNTER — HOSPITAL ENCOUNTER (OUTPATIENT)
Dept: PHYSICAL THERAPY | Facility: HOSPITAL | Age: 58
Setting detail: THERAPIES SERIES
Discharge: HOME | End: 2018-01-30

## 2018-01-30 DIAGNOSIS — B20: Primary | ICD-10-CM

## 2018-01-30 DIAGNOSIS — M54.17 LUMBOSACRAL RADICULOPATHY: Primary | ICD-10-CM

## 2018-01-30 DIAGNOSIS — M54.17 LUMBOSACRAL RADICULOPATHY: ICD-10-CM

## 2018-01-30 DIAGNOSIS — M54.17: Primary | ICD-10-CM

## 2018-01-30 PROCEDURE — 97110 THERAPEUTIC EXERCISES: CPT

## 2018-01-30 PROCEDURE — 97530 THERAPEUTIC ACTIVITIES: CPT

## 2018-01-30 PROCEDURE — 97162 PT EVAL MOD COMPLEX 30 MIN: CPT

## 2018-01-30 NOTE — THERAPY EVALUATION
"    Outpatient Physical Therapy Ortho Initial Evaluation  [facility]     Patient Name: Brittni Hagen  : 1960  MRN: [id number]  Today's Date: 2018      Visit Date: 2018    Patient Active Problem List   Diagnosis   • Type 2 diabetes mellitus   • Hyperlipidemia   • Essential hypertension   • Gastroesophageal reflux disease without esophagitis        Past Medical History:   Diagnosis Date   • Alpha galactosidase deficiency     red meat allergy; \"alpha gal syndrome\"   • Anemia    • Arthritis    • Asthma    • Diabetes mellitus    • Endometriosis    • Fibromyalgia    • Goiter    • Hyperlipidemia    • Hypertension     takes bp meds to help kidneys   • Neuropathy    • Sleep apnea    • Tinnitus    • Vitamin D deficiency         Past Surgical History:   Procedure Laterality Date   • CARPAL TUNNEL RELEASE Bilateral    • LAMINECTOMY  2017   • LAPAROSCOPIC TUBAL LIGATION     • TUBAL ABDOMINAL LIGATION         Visit Dx:     ICD-10-CM ICD-9-CM   1. Lumbosacral radiculopathy M54.17 724.4             Patient History       18 0900          History    Chief Complaint Pain  -      Type of Pain Lower Extremity / Leg   Right  -      Date Current Problem(s) Began 17  -      Brief Description of Current Complaint Patient underwent L4-L5 lumbar laminectomy on 2017 and reports having 2 seizures following surgery, with unknown cause of seizures. Patient reports she has not gotten the pain relief from surgery that she anticipated and reports that it feels like her muscles are in a big knot in low back and reports main problem is almost like muscle cramping in R leg. Patient reports having difficulty with walking and standing.  -      Previous treatment for THIS PROBLEM Surgery;Rehabilitation   Home health for 3-4 weeks with minimal relief  -      Onset Date- PT 2018  -      Surgery Date: 17  -      Patient/Caregiver Goals Relieve pain;Return to work  -      " Current Tobacco Use No  -WC      Smoking Status never  -WC      Patient's Rating of General Health Good  -WC      Hand Dominance right-handed  -WC      Occupation/sports/leisure activities social security administration  -      What clinical tests have you had for this problem? MRI  -      Results of Clinical Tests negative  -WC      Pain     Pain Location Back;Leg   low back, R leg  -WC      Pain at Present 7  -WC      Pain at Best 4  -WC      Pain at Worst 10  -WC      Pain Frequency Constant/continuous  -      Pain Description Cramping   Patient describes a knot in the back  -      What Performance Factors Make the Current Problem(s) WORSE? walking, sitting  -      What Performance Factors Make the Current Problem(s) BETTER? icy hot  -WC      Tolerance Time- Standing 20-30 min  -WC      Tolerance Time- Sitting 1.5 hours  -WC      Tolerance Time- Walking 10 min  -WC      Is your sleep disturbed? Yes  -      Is medication used to assist with sleep? Yes  -      Total hours of sleep per night 5-6 hours  -      Fall Risk Assessment    Any falls in the past year: No  -WC      Daily Activities    Pt Participated in POC and Goals Yes  -        User Key  (r) = Recorded By, (t) = Taken By, (c) = Cosigned By    Initials Name Provider Type    GALILEO Campuzano PT Physical Therapist                PT Ortho       01/30/18 0900    Subjective Comments    Subjective Comments See hx  -WC    Precautions and Contraindications    Precautions/Limitations seizure precautions;lifting restrictions (specify in comments)   no more than 50#  -    Precautions cannot swim  -    Subjective Pain    Able to rate subjective pain? yes  -    Pre-Treatment Pain Level 7  -WC    Posture/Observations    Alignment Options Lumbar lordosis  -    Lumbar lordosis Increased  -    Posture/Observations Comments Patient with increased lumbar lordosis during static standing, ambulates without an AD  -    Quarter Clearing    Quarter  Clearing Lower Quarter Clearing  -    Neural Tension Signs- Lower Quarter Clearing    Slump Bilateral:;Positive  -    Sensory Screen for Light Touch- Lower Quarter Clearing    L2 (anterior mid thigh) Intact  -WC    L3 (distal anterior thigh) Intact  -WC    L4 (medial lower leg/foot) Intact  -WC    L5 (lateral lower leg/great toe) Intact  -WC    S1 (bottom of foot) Intact  -WC    Lumbosacral Palpation    Piriformis Bilateral:;Tender;Guarded/taut  -WC    Erector Spinae (Paraspinals) Bilateral:;Tender;Guarded/taut  -WC    Hamstring Bilateral:;Tender;Guarded/taut  -WC    Lumbosacral Palpation? Yes  -WC    Lumbar/SI Special Tests    Long Sit Test (Pelvic Malalignment) Bilateral:;Negative  -WC    SHERRIE (hip vs. SI Dysfunction) Bilateral:;Negative  -WC    Sacral Spring Test (SI Dysfunction) Bilateral:;Negative  -WC    ROM (Range of Motion)    General ROM Detail Lumbar AROM flex 55 deg with pain, ext7 deg no pain, R SB 25 deg slight pain, L SB 22 deg no pain; LE AROM WFL  -    MMT (Manual Muscle Testing)    General MMT Assessment Detail R LE hip flex and ABD 3+/5 with pain, quads 4+/5 with pain, HS 4+/5; L LE hip flex 4+/5 hip ABD 4-/5, wauds 5/5, HS 5/5  -    Lower Extremity Flexibility    Hamstrings Severely limited;Bilateral:  -    Gait Assessment/Treatment    Gait, Comment Patient ambulates with a cautious gait pattern - maintains R lateral lean throughout gait cycle - reports an increase in pain during ambulation  -      User Key  (r) = Recorded By, (t) = Taken By, (c) = Cosigned By    Initials Name Provider Type    GALILEO Campuzano PT Physical Therapist                      Therapy Education  Education Details: Patient educated on PT POC and goals. Instructed patient on HEP, handout was given  Given: HEP, Symptoms/condition management, Pain management  Program: New  How Provided: Verbal, Written  Provided to: Patient  Level of Understanding: Verbalized, Demonstrated           PT OP Goals       01/30/18  1200       PT Short Term Goals    STG Date to Achieve 02/13/18  -WC     STG 1 Patient will report a 50% subjective improvement  -WC     STG 2 Patient will improve R hip flexion and ABD MMT from 3+/5 to 4-/5  -WC     STG 3 Patient will improve lumbar flexion from 55 deg to 65 deg with pain </= 4/10  -WC     Long Term Goals    LTG Date to Achieve 02/27/18  -WC     LTG 1 Patient will be indepedent with HEP  -WC     LTG 2 Patient will improve R hip flexion and ABD MMT from 3+/5 to 4+/5  -WC     LTG 3 Patient will have lumbar AROM all planes WFL and pain </= 1/10  -WC     LTG 4 Patient will improve modified Oswestry from 28/50 to 15/50  -WC     LTG 5 Patient will improve B HS flexibility to WFL  -WC     LTG 6 Patient will be able to ambulate > 30 min with pain </= 1/10  -WC     Time Calculation    PT Goal Re-Cert Due Date 02/20/18  -       User Key  (r) = Recorded By, (t) = Taken By, (c) = Cosigned By    Initials Name Provider Type    GALILEO Campuzano PT Physical Therapist                PT Assessment/Plan       01/30/18 1200       PT Assessment    Functional Limitations Impaired gait;Limitation in home management;Limitations in functional capacity and performance;Limitations in community activities;Performance in self-care ADL;Performance in work activities  -     Impairments Endurance;Gait;Muscle strength;Pain;Posture;Poor body mechanics;Range of motion;Sensation  -     Assessment Comments Patient is a 57 year old female with hx of lumbar laminectomy L4-L5 in Dec 2017, with 2 seizures port-surgery with unknown cause. Patient presents to PT today ROM/strength deficits and inhibiting pain that decreases patient's functional mobility and quality of life. Patient would benefit from skilled PT services in order to address impairments in order to return to work.  -     Please refer to paper survey for additional self-reported information Yes  -WC     Rehab Potential Good  -WC     Patient/caregiver participated in  establishment of treatment plan and goals Yes  -WC     Patient would benefit from skilled therapy intervention Yes  -WC     PT Plan    PT Frequency 2x/week  -     Predicted Duration of Therapy Intervention (days/wks) 4-6 weeks  -     Planned CPT's? PT EVAL MOD COMPLELITY: 98152;PT RE-EVAL: 91816;PT THER PROC EA 15 MIN: 09919;PT THER ACT EA 15 MIN: 45290;PT MANUAL THERAPY EA 15 MIN: 91510;PT NEUROMUSC RE-EDUCATION EA 15 MIN: 76316;PT SELF CARE/HOME MGMT/TRAIN EA 15: 25896;PT ELECTRICAL STIM UNATTEND: ;PT ULTRASOUND EA 15 MIN: 17442;PT THER SUPP EA 15 MIN  -WC     PT Plan Comments POC: B LE stretching and strengthening program, manaul therapy for MFR/STM lumbar region and B piriformis, lumbar stabalization exercises and modalities for pain management (US and heat) as needed  -WC       User Key  (r) = Recorded By, (t) = Taken By, (c) = Cosigned By    Initials Name Provider Type    GALILEO Campuzano PT Physical Therapist                  Exercises       01/30/18 0900          Subjective Comments    Subjective Comments See hx  -WC      Subjective Pain    Able to rate subjective pain? yes  -WC      Pre-Treatment Pain Level 7  -WC      Exercise 1    Exercise Name 1 SKTC  -WC      Sets 1 1  -WC      Time (Seconds) 1 30  -WC      Additional Comments Bilateral  -WC      Exercise 2    Exercise Name 2 Standing HS stretch  -WC      Sets 2 2  -WC      Time (Seconds) 2 30 sec  -WC      Additional Comments bilateral  -WC      Exercise 3    Exercise Name 3 Lower trunk rotations  -WC      Sets 3 1  -WC      Reps 3 20  -WC      Additional Comments 5-10 sec hold  -WC      Exercise 4    Exercise Name 4 Supine piriformis stretch  -WC      Sets 4 2  -WC      Time (Seconds) 4 30  -WC      Additional Comments Bilateral  -WC        User Key  (r) = Recorded By, (t) = Taken By, (c) = Cosigned By    Initials Name Provider Type    GALILEO Campuzano PT Physical Therapist                        Outcome Measure Options: Modifed  Nani  Modified Oswestry  Modified Oswestry Score/Comments: 28/50 = 56% impairment      Time Calculation:   Start Time: 0900  Stop Time: 0955  Time Calculation (min): 55 min  Total Timed Code Minutes- PT: 30 minute(s)     Therapy Charges for Today     Code Description Service Date Service Provider Modifiers Qty    21878279521 HC PT THERAPEUTIC ACT EA 15 MIN 1/30/2018 Eliseo Campuzano, PT GP 1    04063198603 HC PT THER PROC EA 15 MIN 1/30/2018 Eliseo Campuzano, PT GP 1    48794957740 HC PT EVAL MOD COMPLEXITY 2 1/30/2018 Eliseo Campuzano, PT GP 1          PT G-Codes  Outcome Measure Options: Modifed Nani Campuzano, PT  1/30/2018

## 2018-02-01 ENCOUNTER — HOSPITAL ENCOUNTER (OUTPATIENT)
Dept: PHYSICAL THERAPY | Facility: HOSPITAL | Age: 58
Setting detail: THERAPIES SERIES
Discharge: HOME | End: 2018-02-01

## 2018-02-01 DIAGNOSIS — M54.17 LUMBOSACRAL RADICULOPATHY: Primary | ICD-10-CM

## 2018-02-01 PROCEDURE — G0283 ELEC STIM OTHER THAN WOUND: HCPCS

## 2018-02-01 PROCEDURE — 97110 THERAPEUTIC EXERCISES: CPT

## 2018-02-01 NOTE — THERAPY TREATMENT NOTE
"    Outpatient Physical Therapy Ortho Treatment Note  [facility]   [facility]       Patient Name: Brittni Hagen  : 1960  MRN: [id number]  Today's Date: 2018      Visit Date: 2018     Pt reports 6/10 pain pre treatment, 3/10 pain post treatment  Attended 2/2 visits.  Insurance available: /  Next MD appt: 3/1/2018.  Recertification: 2018.    Visit Dx:    ICD-10-CM ICD-9-CM   1. Lumbosacral radiculopathy M54.17 724.4       Patient Active Problem List   Diagnosis   • Type 2 diabetes mellitus   • Hyperlipidemia   • Essential hypertension   • Gastroesophageal reflux disease without esophagitis        Past Medical History:   Diagnosis Date   • Alpha galactosidase deficiency     red meat allergy; \"alpha gal syndrome\"   • Anemia    • Arthritis    • Asthma    • Diabetes mellitus    • Endometriosis    • Fibromyalgia    • Goiter    • Hyperlipidemia    • Hypertension     takes bp meds to help kidneys   • Neuropathy    • Sleep apnea    • Tinnitus    • Vitamin D deficiency         Past Surgical History:   Procedure Laterality Date   • CARPAL TUNNEL RELEASE Bilateral    • LAMINECTOMY  2017   • LAPAROSCOPIC TUBAL LIGATION     • TUBAL ABDOMINAL LIGATION               PT Ortho       18 0900    Subjective Comments    Subjective Comments See hx  -WC    Precautions and Contraindications    Precautions/Limitations seizure precautions;lifting restrictions (specify in comments)   no more than 50#  -WC    Precautions cannot swim  -WC    Subjective Pain    Able to rate subjective pain? yes  -WC    Pre-Treatment Pain Level 7  -WC    Posture/Observations    Alignment Options Lumbar lordosis  -WC    Lumbar lordosis Increased  -WC    Posture/Observations Comments Patient with increased lumbar lordosis during static standing, ambulates without an AD  -WC    Quarter Clearing    Quarter Clearing Lower Quarter Clearing  -    Neural Tension Signs- Lower Quarter Clearing    Slump " Bilateral:;Positive  -    Sensory Screen for Light Touch- Lower Quarter Clearing    L2 (anterior mid thigh) Intact  -WC    L3 (distal anterior thigh) Intact  -WC    L4 (medial lower leg/foot) Intact  -WC    L5 (lateral lower leg/great toe) Intact  -WC    S1 (bottom of foot) Intact  -WC    Lumbosacral Palpation    Piriformis Bilateral:;Tender;Guarded/taut  -WC    Erector Spinae (Paraspinals) Bilateral:;Tender;Guarded/taut  -WC    Hamstring Bilateral:;Tender;Guarded/taut  -WC    Lumbosacral Palpation? Yes  -WC    Lumbar/SI Special Tests    Long Sit Test (Pelvic Malalignment) Bilateral:;Negative  -WC    SHERRIE (hip vs. SI Dysfunction) Bilateral:;Negative  -WC    Sacral Spring Test (SI Dysfunction) Bilateral:;Negative  -WC    ROM (Range of Motion)    General ROM Detail Lumbar AROM flex 55 deg with pain, ext7 deg no pain, R SB 25 deg slight pain, L SB 22 deg no pain; LE AROM WFL  -WC    MMT (Manual Muscle Testing)    General MMT Assessment Detail R LE hip flex and ABD 3+/5 with pain, quads 4+/5 with pain, HS 4+/5; L LE hip flex 4+/5 hip ABD 4-/5, wauds 5/5, HS 5/5  -WC    Lower Extremity Flexibility    Hamstrings Severely limited;Bilateral:  -    Gait Assessment/Treatment    Gait, Comment Patient ambulates with a cautious gait pattern - maintains R lateral lean throughout gait cycle - reports an increase in pain during ambulation  -      User Key  (r) = Recorded By, (t) = Taken By, (c) = Cosigned By    Initials Name Provider Type    GALILEO Campuzano PT Physical Therapist                            PT Assessment/Plan       02/01/18 1100       PT Assessment    Assessment Comments (P)  Pt lacking HS and hip flexor flexibility. Good tolerance of therex today. Pain relief post-therex  -     PT Plan    PT Frequency (P)  2x/week  -     Predicted Duration of Therapy Intervention (days/wks) (P)  4-6 weeks  -     PT Plan Comments (P)  Continue with POC set at initial visit.  -       User Key  (r) = Recorded By, (t) =  Taken By, (c) = Cosigned By    Initials Name Provider Type    EMILIE COLMENARES [employer]                Modalities       02/01/18 1100          Moist Heat    MH Applied (P)  Yes  -MC      Location (P)  low back  -MC      Rx Minutes (P)  15 mins  -MC      MH Prior to Rx (P)  Yes   with supine low back stretches  -MC      MH S/P Rx (P)  Yes   w/ Estim  -MC      ELECTRICAL STIMULATION    Attended/Unattended (P)  Unattended  -MC      Stimulation Type (P)  IFC  -MC      Max mAmp (P)  10  -MC      Location/Electrode Placement/Other (P)  Low back  -MC      Rx Minutes (P)  15 mins  -MC        User Key  (r) = Recorded By, (t) = Taken By, (c) = Cosigned By    Initials Name Provider Type    EMILIE COLMENARES [facility]                 Exercises       02/01/18 1000          Subjective Comments    Subjective Comments (P)  Took pain pill this morning, but still really stiff and painful  -MC      Subjective Pain    Able to rate subjective pain? (P)  yes  -MC      Pre-Treatment Pain Level (P)  6  -MC      Aquatics    Aquatics performed? (P)  No  -MC      Exercise 1    Exercise Name 1 (P)  PRO II, L1, Seat 10  -MC      Time (Minutes) 1 (P)  10  -MC      Exercise 2    Exercise Name 2 (P)  Standing B HS stretch  -MC      Sets 2 (P)  3  -MC      Time (Seconds) 2 (P)  30  -MC      Exercise 3    Exercise Name 3 (P)  LTR  -MC      Reps 3 (P)  20  -MC      Additional Comments (P)  5 sec hold   -MC      Exercise 4    Exercise Name 4 (P)  Supine B piriformis stretch  -MC      Sets 4 (P)  2  -MC      Time (Seconds) 4 (P)  30  -MC      Exercise 5    Exercise Name 5 (P)  SL KTC  -MC      Sets 5 (P)  3  -MC      Time (Seconds) 5 (P)  30  -MC      Exercise 6    Exercise Name 6 (P)  DL KTC  -MC      Sets 6 (P)  3  -MC      Time (Seconds) 6 (P)  30  -MC      Exercise 7    Exercise Name 7 (P)  Supine trans ab with adduction  -MC      Reps 7 (P)  20  -MC      Additional Comments (P)  5 sec hold   -MC      Exercise 8    Exercise Name  8 (P)  Seated alternating march with trans ab activation  -      Reps 8 (P)  20  -        User Key  (r) = Recorded By, (t) = Taken By, (c) = Cosigned By    Initials Name Provider Type     [facility]a                                PT OP Goals       02/01/18 1100       PT Short Term Goals    STG Date to Achieve (P)  02/13/18  -     STG 1 (P)  Patient will report a 50% subjective improvement  -     STG 1 Progress (P)  Not Met  -     STG 2 (P)  Patient will improve R hip flexion and ABD MMT from 3+/5 to 4-/5  -     STG 2 Progress (P)  Not Met  -     STG 3 (P)  Patient will improve lumbar flexion from 55 deg to 65 deg with pain </= 4/10  -     STG 3 Progress (P)  Not Met  -     Long Term Goals    LTG Date to Achieve (P)  02/27/18  -     LTG 1 (P)  Patient will be indepedent with HEP  -     LTG 2 (P)  Patient will improve R hip flexion and ABD MMT from 3+/5 to 4+/5  -     LTG 3 (P)  Patient will have lumbar AROM all planes WFL and pain </= 1/10  -     LTG 4 (P)  Patient will improve modified Oswestry from 28/50 to 15/50  -     LTG 5 (P)  Patient will improve B HS flexibility to WFL  -     LTG 6 (P)  Patient will be able to ambulate > 30 min with pain </= 1/10  -       User Key  (r) = Recorded By, (t) = Taken By, (c) = Cosigned By    Initials Name Provider Type     [facility]a           Therapy Education  Given: (P) HEP  Program: (P) Reinforced  How Provided: (P) Verbal  Provided to: (P) Patient  Level of Understanding: (P) Verbalized              Time Calculation:   Start Time: (P) 1050  Stop Time: (P) 1200  Time Calculation (min): (P) 70 min  PT Non-Billable Time (min): (P) 5 min    Therapy Charges for Today     Code Description Service Date Service Provider Modifiers Qty    15093698439 HC PT THER PROC EA 15 MIN 2/1/2018 Hoa Mora GP 3    37588694462 HC PT THER SUPP EA 15 MIN 2/1/2018 Hoa Mora GP 1    [id number]  PT  ELECTRICAL STIM UNATTENDED 2/1/2018 Hao [facility]Mra  1                    [facility]oa Mora  2/1/2018

## 2018-02-05 ENCOUNTER — HOSPITAL ENCOUNTER (OUTPATIENT)
Dept: PHYSICAL THERAPY | Facility: HOSPITAL | Age: 58
Setting detail: THERAPIES SERIES
Discharge: HOME | End: 2018-02-05

## 2018-02-05 DIAGNOSIS — G89.29 CHRONIC LOW BACK PAIN WITH RIGHT-SIDED SCIATICA, UNSPECIFIED BACK PAIN LATERALITY: ICD-10-CM

## 2018-02-05 DIAGNOSIS — M54.17 LUMBOSACRAL RADICULOPATHY: Primary | ICD-10-CM

## 2018-02-05 DIAGNOSIS — M54.41 CHRONIC LOW BACK PAIN WITH RIGHT-SIDED SCIATICA, UNSPECIFIED BACK PAIN LATERALITY: ICD-10-CM

## 2018-02-05 PROCEDURE — G0283 ELEC STIM OTHER THAN WOUND: HCPCS

## 2018-02-05 PROCEDURE — 97110 THERAPEUTIC EXERCISES: CPT

## 2018-02-05 NOTE — THERAPY TREATMENT NOTE
"    Outpatient Physical Therapy Ortho Treatment Note  [facility]     Patient Name: Brittni Hagen  : 1960  MRN: [id number]  Today's Date: 2018      Visit Date: 2018     Subjective Improvement 0  Visits 3/3  Visits approved 75 per year  RTMD 3-  Recert 2018    S/p lumbar laminectony L4/L5  On 2017    Visit Dx:    ICD-10-CM ICD-9-CM   1. Lumbosacral radiculopathy M54.17 724.4   2. Chronic low back pain with right-sided sciatica, unspecified back pain laterality M54.41 724.2    G89.29 724.3     338.29       Patient Active Problem List   Diagnosis   • Type 2 diabetes mellitus   • Hyperlipidemia   • Essential hypertension   • Gastroesophageal reflux disease without esophagitis        Past Medical History:   Diagnosis Date   • Alpha galactosidase deficiency     red meat allergy; \"alpha gal syndrome\"   • Anemia    • Arthritis    • Asthma    • Diabetes mellitus    • Endometriosis    • Fibromyalgia    • Goiter    • Hyperlipidemia    • Hypertension     takes bp meds to help kidneys   • Neuropathy    • Sleep apnea    • Tinnitus    • Vitamin D deficiency         Past Surgical History:   Procedure Laterality Date   • CARPAL TUNNEL RELEASE Bilateral    • LAMINECTOMY  2017   • LAPAROSCOPIC TUBAL LIGATION     • TUBAL ABDOMINAL LIGATION               PT Ortho       18 1100    Subjective Comments    Subjective Comments patient reports no sleeping well.    -CP    Precautions and Contraindications    Contraindications S/P surgery 2017  -CP    Subjective Pain    Able to rate subjective pain? yes  -CP    Pre-Treatment Pain Level 7  -CP    Subjective Pain Comment took pain pill at 730 am  -CP      User Key  (r) = Recorded By, (t) = Taken By, (c) = Cosigned By    Initials Name Provider Type    DAGO Zamora PTA Physical Therapy Assistant                            PT Assessment/Plan       18 1145       PT Assessment    Assessment Comments Good tolerance to " ther ex.    -CP     PT Plan    PT Frequency 2x/week  -CP     Predicted Duration of Therapy Intervention (days/wks) 4-6 weeks  -CP     PT Plan Comments Cont with POC. core stab with BKLL, piriformis stretch  -CP       User Key  (r) = Recorded By, (t) = Taken By, (c) = Cosigned By    Initials Name Provider Type    CP Rachel Zamora PTA Physical Therapy Assistant                Modalities       02/05/18 1100          Moist Heat    MH Applied Yes  -CP      Location low back  -CP      Rx Minutes 15 mins  -CP      MH S/P Rx Yes  -CP      ELECTRICAL STIMULATION    Attended/Unattended Unattended  -CP      Stimulation Type IFC  -CP      Location/Electrode Placement/Other low back prone  -CP      Rx Minutes 15 mins  -CP        User Key  (r) = Recorded By, (t) = Taken By, (c) = Cosigned By    Initials Name Provider Type    CP Rachel Zamora PTA Physical Therapy Assistant                Exercises       02/05/18 1100          Subjective Comments    Subjective Comments patient reports no sleeping well.    -CP      Subjective Pain    Able to rate subjective pain? yes  -CP      Pre-Treatment Pain Level 7  -CP      Post-Treatment Pain Level 4  -CP      Subjective Pain Comment took pain pill at 730 am  -CP      Aquatics    Aquatics performed? No  -CP      Exercise 1    Exercise Name 1 Pro II level 2  -CP      Time (Minutes) 1 10  -CP      Exercise 2    Exercise Name 2 Incline Stretch  -CP      Sets 2 3  -CP      Time (Seconds) 2 30  -CP      Exercise 3    Exercise Name 3 standing HS Stretch  -CP      Sets 3 3  -CP      Time (Seconds) 3 30  -CP      Additional Comments bilateral  -CP      Exercise 4    Exercise Name 4 LTR stretch  -CP      Reps 4 20  -CP      Exercise 5    Exercise Name 5 DKTC with tball  -CP      Reps 5 10  -CP      Time (Seconds) 5 20  -CP      Exercise 6    Exercise Name 6 Supine TA  -CP      Sets 6 2  -CP      Reps 6 10  -CP      Time (Seconds) 6 5  -CP      Exercise 7    Exercise Name 7 ham set on tball   -CP      Sets 7 2  -CP      Reps 7 10  -CP      Time (Seconds) 7 3-5  -CP        User Key  (r) = Recorded By, (t) = Taken By, (c) = Cosigned By    Initials Name Provider Type    CP Rachel Zamora PTA Physical Therapy Assistant                               PT OP Goals       02/05/18 1100       PT Short Term Goals    STG Date to Achieve 02/13/18  -CP     STG 1 Patient will report a 50% subjective improvement  -CP     STG 1 Progress Not Met  -CP     STG 2 Patient will improve R hip flexion and ABD MMT from 3+/5 to 4-/5  -CP     STG 2 Progress Not Met  -CP     STG 3 Patient will improve lumbar flexion from 55 deg to 65 deg with pain </= 4/10  -CP     STG 3 Progress Not Met  -CP     Long Term Goals    LTG Date to Achieve 02/27/18  -CP     LTG 1 Patient will be indepedent with HEP  -CP     LTG 1 Progress Ongoing  -CP     LTG 2 Patient will improve R hip flexion and ABD MMT from 3+/5 to 4+/5  -CP     LTG 2 Progress Progressing  -CP     LTG 3 Patient will have lumbar AROM all planes WFL and pain </= 1/10  -CP     LTG 3 Progress Progressing  -CP     LTG 4 Patient will improve modified Oswestry from 28/50 to 15/50  -CP     LTG 4 Progress Not Met  -CP     LTG 5 Patient will improve B HS flexibility to WFL  -CP     LTG 5 Progress Progressing  -CP     LTG 6 Patient will be able to ambulate > 30 min with pain </= 1/10  -CP     LTG 6 Progress Progressing  -CP     Time Calculation    PT Goal Re-Cert Due Date 02/20/18  -CP       User Key  (r) = Recorded By, (t) = Taken By, (c) = Cosigned By    Initials Name Provider Type    CP Rachel Zamora PTA Physical Therapy Assistant                         Time Calculation:   Start Time: 1100  Stop Time: 1200  Time Calculation (min): 60 min  Total Timed Code Minutes- PT: 45 minute(s)    Therapy Charges for Today     Code Description Service Date Service Provider Modifiers Qty    36194259850 HC PT THER PROC EA 15 MIN 2/5/2018 Rachel Zamora PTA GP 3    68162629431 HC PT ELECTRICAL  STIM UNATTENDED 2/5/2018 Rachel Zamora, BUZZ  1    82406936855 HC PT THER SUPP EA 15 MIN 2/5/2018 Rachel Zamora PTA GP 1                    Rachel Zamora PTA  2/5/2018

## 2018-02-06 ENCOUNTER — TRANSCRIBE ORDERS (OUTPATIENT)
Dept: LAB | Facility: HOSPITAL | Age: 58
End: 2018-02-06

## 2018-02-06 ENCOUNTER — LAB (OUTPATIENT)
Dept: LAB | Facility: HOSPITAL | Age: 58
End: 2018-02-06

## 2018-02-06 DIAGNOSIS — R80.9 PROTEINURIA, UNSPECIFIED TYPE: Primary | ICD-10-CM

## 2018-02-06 DIAGNOSIS — R80.9 PROTEINURIA, UNSPECIFIED TYPE: ICD-10-CM

## 2018-02-06 LAB
BILIRUB UR QL STRIP: NEGATIVE
CLARITY UR: CLEAR
COLOR UR: YELLOW
GLUCOSE UR STRIP-MCNC: NEGATIVE MG/DL
HGB UR QL STRIP.AUTO: ABNORMAL
KETONES UR QL STRIP: NEGATIVE
LEUKOCYTE ESTERASE UR QL STRIP.AUTO: NEGATIVE
NITRITE UR QL STRIP: NEGATIVE
PH UR STRIP.AUTO: 5.5 [PH] (ref 5–9)
PROT UR QL STRIP: NEGATIVE
SP GR UR STRIP: 1.02 (ref 1–1.03)
UROBILINOGEN UR QL STRIP: ABNORMAL

## 2018-02-06 PROCEDURE — 87086 URINE CULTURE/COLONY COUNT: CPT

## 2018-02-06 PROCEDURE — 81003 URINALYSIS AUTO W/O SCOPE: CPT

## 2018-02-07 LAB
BACTERIA SPEC AEROBE CULT: NORMAL
BACTERIA SPEC AEROBE CULT: NORMAL

## 2018-02-08 ENCOUNTER — HOSPITAL ENCOUNTER (OUTPATIENT)
Dept: PHYSICAL THERAPY | Facility: HOSPITAL | Age: 58
Setting detail: THERAPIES SERIES
Discharge: HOME | End: 2018-02-08

## 2018-02-08 DIAGNOSIS — G89.29 CHRONIC LOW BACK PAIN WITH RIGHT-SIDED SCIATICA, UNSPECIFIED BACK PAIN LATERALITY: ICD-10-CM

## 2018-02-08 DIAGNOSIS — M54.17 LUMBOSACRAL RADICULOPATHY: Primary | ICD-10-CM

## 2018-02-08 DIAGNOSIS — M54.41 CHRONIC LOW BACK PAIN WITH RIGHT-SIDED SCIATICA, UNSPECIFIED BACK PAIN LATERALITY: ICD-10-CM

## 2018-02-08 PROCEDURE — 97110 THERAPEUTIC EXERCISES: CPT

## 2018-02-08 PROCEDURE — G0283 ELEC STIM OTHER THAN WOUND: HCPCS

## 2018-02-08 NOTE — THERAPY TREATMENT NOTE
"    Outpatient Physical Therapy Ortho Treatment Note  [facility]     Patient Name: Brittni Hagen  : 1960  MRN: [id number]  Today's Date: 2018      Visit Date: 2018     Sub imp 0  Visit 3 (75)  MD 3/1/18  Re 18    Visit Dx:    ICD-10-CM ICD-9-CM   1. Lumbosacral radiculopathy M54.17 724.4   2. Chronic low back pain with right-sided sciatica, unspecified back pain laterality M54.41 724.2    G89.29 724.3     338.29       Patient Active Problem List   Diagnosis   • Type 2 diabetes mellitus   • Hyperlipidemia   • Essential hypertension   • Gastroesophageal reflux disease without esophagitis        Past Medical History:   Diagnosis Date   • Alpha galactosidase deficiency     red meat allergy; \"alpha gal syndrome\"   • Anemia    • Arthritis    • Asthma    • Diabetes mellitus    • Endometriosis    • Fibromyalgia    • Goiter    • Hyperlipidemia    • Hypertension     takes bp meds to help kidneys   • Neuropathy    • Sleep apnea    • Tinnitus    • Vitamin D deficiency         Past Surgical History:   Procedure Laterality Date   • CARPAL TUNNEL RELEASE Bilateral    • LAMINECTOMY  2017   • LAPAROSCOPIC TUBAL LIGATION     • TUBAL ABDOMINAL LIGATION                               PT Assessment/Plan       18 1136 18 1100    PT Assessment    Assessment Comments (P)  completed rx. Guarded with ex.   -JEFFERSON     PT Plan    PT Frequency  (P)  2x/week  -JEFFERSON    Predicted Duration of Therapy Intervention (days/wks)  (P)  4-6 weeks  -JEFFERSON    PT Plan Comments (P)  Cont per POC, Core stab, hip strength  -       User Key  (r) = Recorded By, (t) = Taken By, (c) = Cosigned By    Initials Name Provider Type    JEFFERSON Duarte, ATC                 Modalities       18 1100          Moist Heat    MH Applied (P)  Yes  -JEFFERSON      Location (P)  low back  -JEFFERSON      Rx Minutes (P)  15 mins  -JEFFERSON      MH S/P Rx (P)  Yes  -JEFFERSON      ELECTRICAL STIMULATION    Attended/Unattended (P)  " Unattended  -JEFFERSON      Stimulation Type (P)  IFC  -JEFFERSON      Location/Electrode Placement/Other (P)  low back prone  -JEFFERSON      Rx Minutes (P)  15 mins  -JEFFERSON        User Key  (r) = Recorded By, (t) = Taken By, (c) = Cosigned By    Initials Name Provider Type    JEFFERSON Duarte ATC                 Exercises       02/08/18 1100          Subjective Comments    Subjective Comments (P)  Reports cont LBP, not as bad at present  -JEFFERSON      Subjective Pain    Able to rate subjective pain? (P)  yes  -JEFFERSON      Pre-Treatment Pain Level (P)  5  -JEFFERSON      Post-Treatment Pain Level (P)  6  -JEFFERSON      Subjective Pain Comment (P)  Took pain meds this morning at 730  -JEFFERSON      Aquatics    Aquatics performed? (P)  No  -JEFFERSON      Exercise 1    Exercise Name 1 (P)  Pro II level 2 seat 10  -JEFFERSON      Time (Minutes) 1 (P)  8  -JEFFERSON      Exercise 2    Exercise Name 2 (P)  standing hip flex stretch  -JEFFERSON      Sets 2 (P)  3  -JEFFERSON      Time (Seconds) 2 (P)  30  -JEFFERSON      Exercise 3    Exercise Name 3 (P)  standing HS Stretch  -JEFFERSON      Sets 3 (P)  3  -JEFFERSON      Time (Seconds) 3 (P)  30  -JEFFERSON      Exercise 4    Exercise Name 4 (P)  LTR stretch  -JEFFERSON      Reps 4 (P)  20  -JEFFERSON      Exercise 5    Exercise Name 5 (P)  DKTC with tball  -JEFFERSON      Reps 5 (P)  15  -JEFFERSON      Time (Seconds) 5 (P)  --  -JEFFERSON      Exercise 6    Exercise Name 6 (P)  Supine TA w/add  -JEFFERSON      Sets 6 (P)  2  -JEFFERSON      Reps 6 (P)  10  -JEFFERSON      Time (Seconds) 6 (P)  5  -JEFFERSON      Exercise 7    Exercise Name 7 (P)  ham set on tball  -JEFFERSON      Sets 7 (P)  2  -JEFFERSON      Reps 7 (P)  10  -JEFFERSON      Time (Seconds) 7 (P)  3-5  -JEFFERSON      Exercise 8    Exercise Name 8 (P)  supine piriformis stretch  -JEFFERSON      Sets 8 (P)  3  -JEFFERSON      Time (Seconds) 8 (P)  30  -JEFFERSON      Exercise 9    Exercise Name 9 (P)  BKLL  -JEFFERSON      Sets 9 (P)  2  -JEFFERSON      Reps 9 (P)  10  -JEFFERSON      Exercise 10    Exercise Name 10 (P)  Clamshell  -JEFFERSON      Sets 10 (P)  1  -JEFFERSON      Reps 10 (P)  15  -JEFFERSON        User Key  (r) = Recorded By, (t) =  Taken By, (c) = Cosigned By    Initials Name Provider Type    JEFFERSON Duarte, ATC                                PT OP Goals       02/08/18 1100       PT Short Term Goals    STG Date to Achieve (P)  02/13/18  -     STG 1 (P)  Patient will report a 50% subjective improvement  -     STG 1 Progress (P)  Progressing  -     STG 2 (P)  Patient will improve R hip flexion and ABD MMT from 3+/5 to 4-/5  -     STG 2 Progress (P)  Not Met  -     STG 3 (P)  Patient will improve lumbar flexion from 55 deg to 65 deg with pain </= 4/10  -     STG 3 Progress (P)  Not Met  -     Long Term Goals    LTG Date to Achieve (P)  02/27/18  -     LTG 1 (P)  Patient will be indepedent with HEP  -     LTG 1 Progress (P)  Ongoing  -     LTG 2 (P)  Patient will improve R hip flexion and ABD MMT from 3+/5 to 4+/5  -     LTG 2 Progress (P)  Progressing  -     LTG 3 (P)  Patient will have lumbar AROM all planes WFL and pain </= 1/10  -     LTG 3 Progress (P)  Progressing  -     LTG 4 (P)  Patient will improve modified Oswestry from 28/50 to 15/50  -     LTG 4 Progress (P)  Not Met  -     LTG 5 (P)  Patient will improve B HS flexibility to WFL  -     LTG 5 Progress (P)  Progressing  -     LTG 6 (P)  Patient will be able to ambulate > 30 min with pain </= 1/10  -     LTG 6 Progress (P)  Progressing  -       User Key  (r) = Recorded By, (t) = Taken By, (c) = Cosigned By    Initials Name Provider Type    JEFFERSON Duarte, ATC           Therapy Education  Given: (P) HEP  Program: (P) Reinforced  How Provided: (P) Verbal  Provided to: (P) Patient  Level of Understanding: (P) Verbalized              Time Calculation:   Start Time: (P) 1100  Stop Time: (P) 1155  Time Calculation (min): (P) 55 min  Total Timed Code Minutes- PT: (P) 40 minute(s)    Therapy Charges for Today     Code Description Service Date Service Provider Modifiers Qty    82992804101 HC PT THER SUPP EA 15 MIN 2/8/2018  Filemon Duarte, ATC  1    88465947729 HC PT ELECTRICAL STIM UNATTENDED 2/8/2018 Filemon Duarte, ATC  1    75743667306 HC PT THER PROC EA 15 MIN 2/8/2018 Filemon Duarte, ATC  3                    Filemon Duarte, ATC  2/8/2018

## 2018-02-12 ENCOUNTER — HOSPITAL ENCOUNTER (OUTPATIENT)
Dept: PHYSICAL THERAPY | Facility: HOSPITAL | Age: 58
Setting detail: THERAPIES SERIES
Discharge: HOME | End: 2018-02-12

## 2018-02-12 DIAGNOSIS — G89.29 CHRONIC LOW BACK PAIN WITH RIGHT-SIDED SCIATICA, UNSPECIFIED BACK PAIN LATERALITY: ICD-10-CM

## 2018-02-12 DIAGNOSIS — M54.17 LUMBOSACRAL RADICULOPATHY: Primary | ICD-10-CM

## 2018-02-12 DIAGNOSIS — M54.41 CHRONIC LOW BACK PAIN WITH RIGHT-SIDED SCIATICA, UNSPECIFIED BACK PAIN LATERALITY: ICD-10-CM

## 2018-02-12 PROCEDURE — G0283 ELEC STIM OTHER THAN WOUND: HCPCS

## 2018-02-12 PROCEDURE — 97110 THERAPEUTIC EXERCISES: CPT

## 2018-02-12 NOTE — THERAPY TREATMENT NOTE
"    Outpatient Physical Therapy Ortho Treatment Note  [facility]     Patient Name: Brittni Hagen  : 1960  MRN: [id number]  Today's Date: 2018      Visit Date: 2018     Subjective Improvement 20%  Visits 4/4  Visits approved 75 per year  RTMD 3-  recert 2018    S/P lumbar laminectony L4-L5 on 2018    Visit Dx:    ICD-10-CM ICD-9-CM   1. Lumbosacral radiculopathy M54.17 724.4   2. Chronic low back pain with right-sided sciatica, unspecified back pain laterality M54.41 724.2    G89.29 724.3     338.29       Patient Active Problem List   Diagnosis   • Type 2 diabetes mellitus   • Hyperlipidemia   • Essential hypertension   • Gastroesophageal reflux disease without esophagitis        Past Medical History:   Diagnosis Date   • Alpha galactosidase deficiency     red meat allergy; \"alpha gal syndrome\"   • Anemia    • Arthritis    • Asthma    • Diabetes mellitus    • Endometriosis    • Fibromyalgia    • Goiter    • Hyperlipidemia    • Hypertension     takes bp meds to help kidneys   • Neuropathy    • Sleep apnea    • Tinnitus    • Vitamin D deficiency         Past Surgical History:   Procedure Laterality Date   • CARPAL TUNNEL RELEASE Bilateral    • LAMINECTOMY  2017   • LAPAROSCOPIC TUBAL LIGATION     • TUBAL ABDOMINAL LIGATION               PT Ortho       18 1300    Precautions and Contraindications    Precautions no lifting more than 50 lb  -CP    Contraindications s/p surgery 2018  -CP      User Key  (r) = Recorded By, (t) = Taken By, (c) = Cosigned By    Initials Name Provider Type    CP Rachel Zamora PTA Physical Therapy Assistant                            PT Assessment/Plan       18 1446       PT Assessment    Assessment Comments Tolerated increase core stab ex well.  -CP     PT Plan    PT Frequency 3x/week  -CP     Predicted Duration of Therapy Intervention (days/wks) 4 weeks  -CP     PT Plan Comments cont with poc.  TA with BKLL  -CP "       User Key  (r) = Recorded By, (t) = Taken By, (c) = Cosigned By    Initials Name Provider Type    CP Rachel Zamora PTA Physical Therapy Assistant                Modalities       02/12/18 1300          Subjective Pain    Subjective Pain Comment pain pill at 730 a.m.  -CP      Moist Heat    MH Applied Yes  -CP      Location low back  -CP      Rx Minutes --   20 minutes with IFC  -CP      MH S/P Rx Yes  -CP      ELECTRICAL STIMULATION    Attended/Unattended Unattended  -CP      Stimulation Type IFC  -CP      Location/Electrode Placement/Other Low back bryn   -CP      Rx Minutes 20 mins  -CP        User Key  (r) = Recorded By, (t) = Taken By, (c) = Cosigned By    Initials Name Provider Type    CP Rachel Zamora PTA Physical Therapy Assistant                Exercises       02/12/18 1300          Subjective Comments    Subjective Comments Reports increase pain today.  -CP      Subjective Pain    Able to rate subjective pain? yes  -CP      Pre-Treatment Pain Level 6  -CP      Post-Treatment Pain Level 4  -CP      Subjective Pain Comment pain pill at 730 a.m.  -CP      Aquatics    Aquatics performed? No  -CP      Exercise 1    Exercise Name 1 Pro II level 3  -CP      Time (Minutes) 1 10  -CP      Exercise 2    Exercise Name 2 incline stretch  -CP      Sets 2 3  -CP      Time (Seconds) 2 30  -CP      Exercise 3    Exercise Name 3 standing HS Stretch  -CP      Sets 3 3  -CP      Time (Seconds) 3 30  -CP      Additional Comments bilateral  -CP      Exercise 4    Exercise Name 4 standing hip fl stretch  -CP      Sets 4 3  -CP      Time (Seconds) 4 30  -CP      Exercise 5    Exercise Name 5 small dynadisk marching with core stab  -CP      Sets 5 2  -CP      Reps 5 10  -CP      Time (Minutes) 5 bilateral  -CP      Exercise 6    Exercise Name 6 small dynadisk with core stab LAQ  -CP      Sets 6 1  -CP      Reps 6 15  -CP      Exercise 7    Exercise Name 7 supine piriformis stretch  -CP      Sets 7 3  -CP      Time  (Seconds) 7 30  -CP      Exercise 8    Exercise Name 8 DKTC with Tball  -CP      Reps 8 15  -CP      Time (Seconds) 8 10  -CP      Exercise 9    Exercise Name 9 LTR stretch  -CP      Reps 9 30  -CP      Exercise 10    Exercise Name 10 bridging with TA  -CP      Sets 10 2  -CP      Reps 10 10  -CP        User Key  (r) = Recorded By, (t) = Taken By, (c) = Cosigned By    Initials Name Provider Type    CP Rachel Zamora PTA Physical Therapy Assistant                               PT OP Goals       02/12/18 1400       PT Short Term Goals    STG Date to Achieve 02/13/18  -CP     STG 1 Patient will report a 50% subjective improvement  -CP     STG 1 Progress Progressing  -CP     STG 2 Patient will improve R hip flexion and ABD MMT from 3+/5 to 4-/5  -CP     STG 2 Progress Not Met  -CP     STG 3 Patient will improve lumbar flexion from 55 deg to 65 deg with pain </= 4/10  -CP     STG 3 Progress Not Met  -CP     Long Term Goals    LTG Date to Achieve 02/27/18  -CP     LTG 1 Patient will be indepedent with HEP  -CP     LTG 1 Progress Ongoing  -CP     LTG 2 Patient will improve R hip flexion and ABD MMT from 3+/5 to 4+/5  -CP     LTG 2 Progress Progressing  -CP     LTG 3 Patient will have lumbar AROM all planes WFL and pain </= 1/10  -CP     LTG 3 Progress Progressing  -CP     LTG 4 Patient will improve modified Oswestry from 28/50 to 15/50  -CP     LTG 4 Progress Not Met  -CP     LTG 5 Patient will improve B HS flexibility to WFL  -CP     LTG 5 Progress Progressing  -CP     LTG 6 Patient will be able to ambulate > 30 min with pain </= 1/10  -CP     LTG 6 Progress Progressing  -CP     Time Calculation    PT Goal Re-Cert Due Date 02/20/18  -CP       User Key  (r) = Recorded By, (t) = Taken By, (c) = Cosigned By    Initials Name Provider Type    CP Rachel Zamora PTA Physical Therapy Assistant          Therapy Education  Given: Pain management, HEP  Program: Reinforced  How Provided: Verbal  Provided to: Patient  Level of  Understanding: Verbalized              Time Calculation:   Start Time: 1304  Stop Time: 1410  Time Calculation (min): 66 min  Total Timed Code Minutes- PT: 45 minute(s)    Therapy Charges for Today     Code Description Service Date Service Provider Modifiers Qty    25123029187 HC PT THER PROC EA 15 MIN 2/12/2018 Rachel Zamora PTA GP 3    78867800046 HC PT ELECTRICAL STIM UNATTENDED 2/12/2018 Rachel Zamora PTA  1    86633174072 HC PT THER SUPP EA 15 MIN 2/12/2018 Rachel Zamora PTA GP 1                    Rachel Zamora PTA  2/12/2018

## 2018-02-15 ENCOUNTER — HOSPITAL ENCOUNTER (OUTPATIENT)
Dept: PHYSICAL THERAPY | Facility: HOSPITAL | Age: 58
Setting detail: THERAPIES SERIES
Discharge: HOME | End: 2018-02-15

## 2018-02-15 DIAGNOSIS — M54.17 LUMBOSACRAL RADICULOPATHY: Primary | ICD-10-CM

## 2018-02-15 DIAGNOSIS — M54.41 CHRONIC LOW BACK PAIN WITH RIGHT-SIDED SCIATICA, UNSPECIFIED BACK PAIN LATERALITY: ICD-10-CM

## 2018-02-15 DIAGNOSIS — G89.29 CHRONIC LOW BACK PAIN WITH RIGHT-SIDED SCIATICA, UNSPECIFIED BACK PAIN LATERALITY: ICD-10-CM

## 2018-02-15 PROCEDURE — G0283 ELEC STIM OTHER THAN WOUND: HCPCS

## 2018-02-15 PROCEDURE — 97110 THERAPEUTIC EXERCISES: CPT

## 2018-02-15 PROCEDURE — 97140 MANUAL THERAPY 1/> REGIONS: CPT

## 2018-02-15 NOTE — THERAPY TREATMENT NOTE
"    Outpatient Physical Therapy Ortho Treatment Note  [facility]     Patient Name: Brittni Hagen  : 1960  MRN: [id number]  Today's Date: 2/15/2018      Visit Date: 02/15/2018    Subjective Improvement 20%  Visits 5/5  Visits approved 75 per year  RTMD 3-  Recert date 2018    S/P lumbar laminectomy L4-L5 on 2018    Visit Dx:    ICD-10-CM ICD-9-CM   1. Lumbosacral radiculopathy M54.17 724.4   2. Chronic low back pain with right-sided sciatica, unspecified back pain laterality M54.41 724.2    G89.29 724.3     338.29       Patient Active Problem List   Diagnosis   • Type 2 diabetes mellitus   • Hyperlipidemia   • Essential hypertension   • Gastroesophageal reflux disease without esophagitis        Past Medical History:   Diagnosis Date   • Alpha galactosidase deficiency     red meat allergy; \"alpha gal syndrome\"   • Anemia    • Arthritis    • Asthma    • Diabetes mellitus    • Endometriosis    • Fibromyalgia    • Goiter    • Hyperlipidemia    • Hypertension     takes bp meds to help kidneys   • Neuropathy    • Sleep apnea    • Tinnitus    • Vitamin D deficiency         Past Surgical History:   Procedure Laterality Date   • CARPAL TUNNEL RELEASE Bilateral    • LAMINECTOMY  2017   • LAPAROSCOPIC TUBAL LIGATION     • TUBAL ABDOMINAL LIGATION                               PT Assessment/Plan       02/15/18 1149       PT Assessment    Assessment Comments TTP to bilaeral low back area  -CP     PT Plan    PT Frequency 2x/week  -CP     Predicted Duration of Therapy Intervention (days/wks) 4-6 weeks  -CP     PT Plan Comments Possible US to low back  -CP       User Key  (r) = Recorded By, (t) = Taken By, (c) = Cosigned By    Initials Name Provider Type    CP Rachel Zamora PTA Physical Therapy Assistant                Modalities       02/15/18 1100          Moist Heat    MH Applied Yes  -CP      Location low back in sitting  -CP      Rx Minutes 15 mins  -CP      MH S/P Rx Yes  " -CP      ELECTRICAL STIMULATION    Attended/Unattended Unattended  -CP      Stimulation Type IFC  -CP      Location/Electrode Placement/Other low back in sitting  -CP      Rx Minutes 15 mins  -CP        User Key  (r) = Recorded By, (t) = Taken By, (c) = Cosigned By    Initials Name Provider Type    CP Rachel Zamora PTA Physical Therapy Assistant                Exercises       02/15/18 1100          Subjective Comments    Subjective Comments Patient states that after last herpay visit she felt pretty good.  However, the next day pain was10/10.  Patient states she has increased pain while sleeping.  Today pain has decrease.  Pain is mostly on right low back, right hip.  -CP      Subjective Pain    Able to rate subjective pain? yes  -CP      Pre-Treatment Pain Level 6  -CP      Post-Treatment Pain Level 4  -CP      Aquatics    Aquatics performed? No  -CP      Exercise 1    Exercise Name 1 Pro II level 2  -CP      Time (Minutes) 1 10  -CP      Exercise 2    Exercise Name 2 incline stretch  -CP      Sets 2 3  -CP      Time (Seconds) 2 30  -CP      Exercise 3    Exercise Name 3 standing HS stretch  -CP      Sets 3 3  -CP      Time (Seconds) 3 30  -CP      Additional Comments bilateral  -CP      Exercise 4    Exercise Name 4 standing hip fl stretch  -CP      Sets 4 3  -CP      Time (Seconds) 4 30  -CP      Exercise 5    Exercise Name 5 ltr stretch  -CP      Sets 5 2  -CP      Reps 5 10  -CP      Exercise 6    Exercise Name 6 DKTC with tball  -CP      Reps 6 10  -CP      Time (Seconds) 6 20  -CP        User Key  (r) = Recorded By, (t) = Taken By, (c) = Cosigned By    Initials Name Provider Type    CP Rachel Zamora PTA Physical Therapy Assistant                        Manual Rx (last 36 hours)      Manual Treatments       02/15/18 1100          Manual Rx 1    Manual Rx 1 Location low back  -CP      Manual Rx 1 Type MFR/STM  -CP      Manual Rx 1 Duration 8  -CP        User Key  (r) = Recorded By, (t) = Taken By, (c) =  Cosigned By    Initials Name Provider Type    CP Rachel Zamora PTA Physical Therapy Assistant                PT OP Goals       02/15/18 1100       PT Short Term Goals    STG Date to Achieve 02/13/18  -CP     STG 1 Patient will report a 50% subjective improvement  -CP     STG 1 Progress Progressing  -CP     STG 2 Patient will improve R hip flexion and ABD MMT from 3+/5 to 4-/5  -CP     STG 2 Progress Not Met  -CP     STG 3 Patient will improve lumbar flexion from 55 deg to 65 deg with pain </= 4/10  -CP     STG 3 Progress Not Met  -CP     Long Term Goals    LTG Date to Achieve 02/27/18  -CP     LTG 1 Patient will be indepedent with HEP  -CP     LTG 1 Progress Ongoing  -CP     LTG 2 Patient will improve R hip flexion and ABD MMT from 3+/5 to 4+/5  -CP     LTG 2 Progress Progressing  -CP     LTG 3 Patient will have lumbar AROM all planes WFL and pain </= 1/10  -CP     LTG 3 Progress Progressing  -CP     LTG 4 Patient will improve modified Oswestry from 28/50 to 15/50  -CP     LTG 4 Progress Not Met  -CP     LTG 5 Patient will improve B HS flexibility to WFL  -CP     LTG 5 Progress Progressing  -CP     LTG 6 Patient will be able to ambulate > 30 min with pain </= 1/10  -CP     LTG 6 Progress Progressing  -CP     Time Calculation    PT Goal Re-Cert Due Date 02/20/18  -CP       User Key  (r) = Recorded By, (t) = Taken By, (c) = Cosigned By    Initials Name Provider Type    CP Rachel Zamora PTA Physical Therapy Assistant          Therapy Education  Given: HEP  Program: Reinforced  How Provided: Verbal  Provided to: Patient  Level of Understanding: Verbalized              Time Calculation:   Start Time: 1100  Stop Time: 1200  Time Calculation (min): 60 min  Total Timed Code Minutes- PT: 45 minute(s)    Therapy Charges for Today     Code Description Service Date Service Provider Modifiers Qty    75688422187 HC PT THER PROC EA 15 MIN 2/15/2018 Rachel Zamora PTA GP 2    52601644976 HC PT MANUAL THERAPY EA 15 MIN  2/15/2018 Rachel Zamora, PTA GP 1    94957943315 HC PT ELECTRICAL STIM UNATTENDED 2/15/2018 Rachel Zamora PTA  1    61117157157 HC PT THER SUPP EA 15 MIN 2/15/2018 Rachel Zamora PTA GP 1                    Rachel Zamora, PTA  2/15/2018

## 2018-02-19 ENCOUNTER — HOSPITAL ENCOUNTER (OUTPATIENT)
Dept: PHYSICAL THERAPY | Facility: HOSPITAL | Age: 58
Setting detail: THERAPIES SERIES
Discharge: HOME | End: 2018-02-19

## 2018-02-19 DIAGNOSIS — M54.17 LUMBOSACRAL RADICULOPATHY: Primary | ICD-10-CM

## 2018-02-19 DIAGNOSIS — M54.41 CHRONIC LOW BACK PAIN WITH RIGHT-SIDED SCIATICA, UNSPECIFIED BACK PAIN LATERALITY: ICD-10-CM

## 2018-02-19 DIAGNOSIS — G89.29 CHRONIC LOW BACK PAIN WITH RIGHT-SIDED SCIATICA, UNSPECIFIED BACK PAIN LATERALITY: ICD-10-CM

## 2018-02-19 PROCEDURE — 97110 THERAPEUTIC EXERCISES: CPT

## 2018-02-19 PROCEDURE — G0283 ELEC STIM OTHER THAN WOUND: HCPCS

## 2018-02-19 NOTE — THERAPY TREATMENT NOTE
"    Outpatient Physical Therapy Ortho Treatment Note  [facility]   [facility]       Patient Name: Brittni Hagen  : 1960  MRN: [id number]  Today's Date: 2018      Visit Date: 2018   Pt reports 6/10 pain pre treatment, 5/10 pain post treatment  Attended 6/6 visits.  Insurance available: 75/yr  Next MD appt: 3/1/2018.  Recertification: 2018.    Visit Dx:    ICD-10-CM ICD-9-CM   1. Lumbosacral radiculopathy M54.17 724.4   2. Chronic low back pain with right-sided sciatica, unspecified back pain laterality M54.41 724.2    G89.29 724.3     338.29       Patient Active Problem List   Diagnosis   • Type 2 diabetes mellitus   • Hyperlipidemia   • Essential hypertension   • Gastroesophageal reflux disease without esophagitis        Past Medical History:   Diagnosis Date   • Alpha galactosidase deficiency     red meat allergy; \"alpha gal syndrome\"   • Anemia    • Arthritis    • Asthma    • Diabetes mellitus    • Endometriosis    • Fibromyalgia    • Goiter    • Hyperlipidemia    • Hypertension     takes bp meds to help kidneys   • Neuropathy    • Sleep apnea    • Tinnitus    • Vitamin D deficiency         Past Surgical History:   Procedure Laterality Date   • CARPAL TUNNEL RELEASE Bilateral    • LAMINECTOMY  2017   • LAPAROSCOPIC TUBAL LIGATION     • TUBAL ABDOMINAL LIGATION                               PT Assessment/Plan       18 1300       PT Assessment    Assessment Comments (P)  Patient appears stff in movements. Good effort throughout therex.  -     PT Plan    PT Frequency (P)  2x/week  -     Predicted Duration of Therapy Intervention (days/wks) (P)  4-6 weeks   -     PT Plan Comments (P)  Continue with POC. Due for recert early next week.  -       User Key  (r) = Recorded By, (t) = Taken By, (c) = Cosigned By    Initials Name Provider Type     Hoa COLMENARES Morgan                 Modalities       18 1200          Moist Heat    MH " Applied (P)  Yes  -MC      Location (P)  low back prone  -MC      Rx Minutes (P)  15 mins  -MC      MH S/P Rx (P)  Yes  -MC      ELECTRICAL STIMULATION    Attended/Unattended (P)  Unattended  -MC      Stimulation Type (P)  IFC  -MC      Max mAmp (P)  21  -MC      Location/Electrode Placement/Other (P)  low back prone   -MC      Rx Minutes (P)  15 mins  -MC        User Key  (r) = Recorded By, (t) = Taken By, (c) = Cosigned By    Initials Name Provider Type     Hoa Mora                 Exercises       02/19/18 1200          Subjective Comments    Subjective Comments (P)  Patient reports that her back sometimes feels better than others. She reports that she stayed in bed a lot this weekend in fear of aggravating it too much.  -MC      Subjective Pain    Able to rate subjective pain? (P)  yes  -MC      Pre-Treatment Pain Level (P)  6  -MC      Post-Treatment Pain Level (P)  5  -MC      Aquatics    Aquatics performed? (P)  No  -MC      Exercise 1    Exercise Name 1 (P)  Pro II Level 2   -MC      Time (Minutes) 1 (P)  10  -MC      Exercise 2    Exercise Name 2 (P)  Tball ham set   -MC      Sets 2 (P)  1  -MC      Reps 2 (P)  10  -MC      Time (Seconds) 2 (P)  5 sec  -MC      Exercise 3    Exercise Name 3 (P)  Standing HS stretch  -MC      Sets 3 (P)  3  -MC      Time (Seconds) 3 (P)  30  -MC      Exercise 4    Exercise Name 4 (P)  standing hip flexor stretch  -MC      Sets 4 (P)  3  -MC      Time (Seconds) 4 (P)  30  -MC      Exercise 5    Exercise Name 5 (P)  LTR stretch  -MC      Sets 5 (P)  2  -MC      Reps 5 (P)  10  -MC      Exercise 6    Exercise Name 6 (P)  DKTC w/ tball  -MC      Reps 6 (P)  20  -MC      Time (Seconds) 6 (P)  5  -MC      Exercise 7    Exercise Name 7 (P)  Supine piriformis stretch  -MC      Sets 7 (P)  3  -MC      Time (Seconds) 7 (P)  30  -MC      Exercise 8    Exercise Name 8 (P)  Dynadisk march  -MC      Sets 8 (P)  --  -MC      Reps 8 (P)  20  -MC      Exercise 9     Exercise Name 9 (P)  Dynbautista LAQ  -      Sets 9 (P)  2  -      Reps 9 (P)  10  -      Exercise 10    Exercise Name 10 (P)  Bridging w/ trans ab   -      Sets 10 (P)  --  -      Reps 10 (P)  10  -        User Key  (r) = Recorded By, (t) = Taken By, (c) = Cosigned By    Initials Name Provider Type     Hoa Hardygrove                                PT OP Goals       02/19/18 1300       PT Short Term Goals    STG Date to Achieve (P)  02/13/18  -     STG 1 (P)  Patient will report a 50% subjective improvement  -     STG 1 Progress (P)  Progressing  -     STG 2 (P)  Patient will improve R hip flexion and ABD MMT from 3+/5 to 4-/5  -     STG 2 Progress (P)  Not Met  -     STG 3 (P)  Patient will improve lumbar flexion from 55 deg to 65 deg with pain </= 4/10  -     STG 3 Progress (P)  Not Met  -     Long Term Goals    LTG Date to Achieve (P)  02/27/18  -     LTG 1 (P)  Patient will be indepedent with HEP  -     LTG 1 Progress (P)  Ongoing  -     LTG 2 (P)  Patient will improve R hip flexion and ABD MMT from 3+/5 to 4+/5  -     LTG 2 Progress (P)  Progressing  -     LTG 3 (P)  Patient will have lumbar AROM all planes WFL and pain </= 1/10  -     LTG 3 Progress (P)  Progressing  -     LTG 4 (P)  Patient will improve modified Oswestry from 28/50 to 15/50  -     LTG 4 Progress (P)  Not Met  -     LTG 5 (P)  Patient will improve B HS flexibility to WFL  -     LTG 5 Progress (P)  Progressing  -     LTG 6 (P)  Patient will be able to ambulate > 30 min with pain </= 1/10  -     LTG 6 Progress (P)  Progressing  -       User Key  (r) = Recorded By, (t) = Taken By, (c) = Cosigned By    Initials Name Provider Type    EMILIE Mora           Therapy Education  Given: (P) HEP  Program: (P) Reinforced  How Provided: (P) Verbal  Provided to: (P) Patient  Level of Understanding: (P) Verbalized              Time Calculation:   Start Time: (P)  1256  Stop Time: (P) 1356  Time Calculation (min): (P) 60 min    Therapy Charges for Today     Code Description Service Date Service Provider Modifiers Qty    83870116854 HC PT ELECTRICAL STIM UNATTENDED 2/19/2018 Hoa Mora  1    43514830016 HC PT THER PROC EA 15 MIN 2/19/2018 Hoa Mora GP 3    85719029619 HC PT THER SUPP EA 15 MIN 2/19/2018 Hoa Mora GP 1                    Hoa Mora  2/19/2018

## 2018-02-20 ENCOUNTER — APPOINTMENT (OUTPATIENT)
Dept: PHYSICAL THERAPY | Facility: HOSPITAL | Age: 58
End: 2018-02-20

## 2018-02-21 ENCOUNTER — HOSPITAL ENCOUNTER (OUTPATIENT)
Dept: PHYSICAL THERAPY | Facility: HOSPITAL | Age: 58
Setting detail: THERAPIES SERIES
Discharge: HOME | End: 2018-02-21

## 2018-02-21 DIAGNOSIS — M54.17 LUMBOSACRAL RADICULOPATHY: Primary | ICD-10-CM

## 2018-02-21 PROCEDURE — 97110 THERAPEUTIC EXERCISES: CPT

## 2018-02-21 PROCEDURE — G0283 ELEC STIM OTHER THAN WOUND: HCPCS

## 2018-02-21 NOTE — THERAPY TREATMENT NOTE
"    Outpatient Physical Therapy Ortho Treatment Note  [facility]     Patient Name: Brittni Hagen  : 1960  MRN: [id number]  Today's Date: 2018      Visit Date: 2018     Subjective Improvement not sure  Visits 8  Visits approved 75 per year  RTMD 3-  Recert Date 2018    S/P lumbar laminectomy 2017    Visit Dx:    ICD-10-CM ICD-9-CM   1. Lumbosacral radiculopathy M54.17 724.4       Patient Active Problem List   Diagnosis   • Type 2 diabetes mellitus   • Hyperlipidemia   • Essential hypertension   • Gastroesophageal reflux disease without esophagitis        Past Medical History:   Diagnosis Date   • Alpha galactosidase deficiency     red meat allergy; \"alpha gal syndrome\"   • Anemia    • Arthritis    • Asthma    • Diabetes mellitus    • Endometriosis    • Fibromyalgia    • Goiter    • Hyperlipidemia    • Hypertension     takes bp meds to help kidneys   • Neuropathy    • Sleep apnea    • Tinnitus    • Vitamin D deficiency         Past Surgical History:   Procedure Laterality Date   • CARPAL TUNNEL RELEASE Bilateral    • LAMINECTOMY  2017   • LAPAROSCOPIC TUBAL LIGATION     • TUBAL ABDOMINAL LIGATION               PT Ortho       18 1100    Precautions and Contraindications    Precautions no lifting more than 50 lb  -CP    Contraindications S/P lumbar laminectomy on 2017  -CP    Posture/Observations    Posture/Observations Comments amb with guarded and stiff movement  -CP      User Key  (r) = Recorded By, (t) = Taken By, (c) = Cosigned By    Initials Name Provider Type    CP Rachel Zamora, PTA Physical Therapy Assistant                            PT Assessment/Plan       18 1259       PT Assessment    Assessment Comments Patient guarded this date.  -CP     PT Plan    PT Frequency 2x/week  -CP     Predicted Duration of Therapy Intervention (days/wks) 4 to 6 weeks  -CP     PT Plan Comments cont with poc Possible US to right low back  -CP     "   User Key  (r) = Recorded By, (t) = Taken By, (c) = Cosigned By    Initials Name Provider Type    CP Rachel Zamora PTA Physical Therapy Assistant                Modalities       02/21/18 1100          Subjective Pain    Post-Treatment Pain Level 6  -CP      Moist Heat    MH Applied Yes  -CP      Location low back pain  -CP      Rx Minutes --   20 minutes with IFC  -CP      MH S/P Rx Yes  -CP      ELECTRICAL STIMULATION    Attended/Unattended Unattended  -CP      Stimulation Type IFC  -CP      Location/Electrode Placement/Other low back prone  -CP      Rx Minutes 20 mins  -CP        User Key  (r) = Recorded By, (t) = Taken By, (c) = Cosigned By    Initials Name Provider Type    CP Rachel Zamora PTA Physical Therapy Assistant                Exercises       02/21/18 1100          Subjective Comments    Subjective Comments Patient reports increase low back pain.  States that she was watching her granddaughter yesterday when her garnddauaghter fell.  Patient had to pick her up and carry her. Since then she has had increase pain  -CP      Subjective Pain    Able to rate subjective pain? yes  -CP      Pre-Treatment Pain Level 7  -CP      Post-Treatment Pain Level 6  -CP      Aquatics    Aquatics performed? No  -CP      Exercise 1    Exercise Name 1 Pro II level 2  -CP      Time (Minutes) 1 10  -CP      Exercise 2    Exercise Name 2 incline stretch  -CP      Sets 2 3  -CP      Time (Minutes) 2 30  -CP      Exercise 3    Exercise Name 3 LTR stretch with gently overpressure  -CP      Reps 3 30  -CP      Exercise 4    Exercise Name 4 Manual HS stretch  -CP      Sets 4 3  -CP      Time (Seconds) 4 30  -CP      Additional Comments bilateral  -CP      Exercise 5    Exercise Name 5 Manual piriformis stretch  -CP      Sets 5 3  -CP      Time (Seconds) 5 30  -CP      Additional Comments bilateral  -CP      Exercise 6    Exercise Name 6 SKTC  -CP      Sets 6 3  -CP      Time (Seconds) 6 30  -CP      Additional Comments  bilateral  -CP      Exercise 7    Exercise Name 7 BKFO  -CP      Sets 7 2  -CP      Reps 7 10  -CP        User Key  (r) = Recorded By, (t) = Taken By, (c) = Cosigned By    Initials Name Provider Type    CP Rachel Zamora PTA Physical Therapy Assistant                               PT OP Goals       02/21/18 1200       PT Short Term Goals    STG Date to Achieve 02/13/18  -CP     STG 1 Patient will report a 50% subjective improvement  -CP     STG 1 Progress Progressing  -CP     STG 2 Patient will improve R hip flexion and ABD MMT from 3+/5 to 4-/5  -CP     STG 2 Progress Not Met  -CP     STG 3 Patient will improve lumbar flexion from 55 deg to 65 deg with pain </= 4/10  -CP     STG 3 Progress Not Met  -CP     Long Term Goals    LTG Date to Achieve 02/27/18  -CP     LTG 1 Patient will be indepedent with HEP  -CP     LTG 1 Progress Ongoing  -CP     LTG 2 Patient will improve R hip flexion and ABD MMT from 3+/5 to 4+/5  -CP     LTG 2 Progress Progressing  -CP     LTG 3 Patient will have lumbar AROM all planes WFL and pain </= 1/10  -CP     LTG 3 Progress Progressing  -CP     LTG 4 Patient will improve modified Oswestry from 28/50 to 15/50  -CP     LTG 4 Progress Not Met  -CP     LTG 5 Patient will improve B HS flexibility to WFL  -CP     LTG 5 Progress Progressing  -CP     LTG 6 Patient will be able to ambulate > 30 min with pain </= 1/10  -CP     LTG 6 Progress Progressing  -CP     Time Calculation    PT Goal Re-Cert Due Date 02/20/18  -CP       User Key  (r) = Recorded By, (t) = Taken By, (c) = Cosigned By    Initials Name Provider Type    CP Rachel Zamora PTA Physical Therapy Assistant                         Time Calculation:   Start Time: 1100  Stop Time: 1200  Time Calculation (min): 60 min  Total Timed Code Minutes- PT: 60 minute(s)    Therapy Charges for Today     Code Description Service Date Service Provider Modifiers Qty    35657849613  PT THER PROC EA 15 MIN 2/21/2018 Rachel Zamora PTA GP 3     13421374682 HC PT ELECTRICAL STIM UNATTENDED 2/21/2018 Rachel Zamora, PTA  1    98174162630 HC PT THER SUPP EA 15 MIN 2/21/2018 Rachel Zamora PTA GP 1                    Rachel Zamora, PTA  2/21/2018

## 2018-02-26 ENCOUNTER — APPOINTMENT (OUTPATIENT)
Dept: PHYSICAL THERAPY | Facility: HOSPITAL | Age: 58
End: 2018-02-26

## 2018-02-28 ENCOUNTER — APPOINTMENT (OUTPATIENT)
Dept: PHYSICAL THERAPY | Facility: HOSPITAL | Age: 58
End: 2018-02-28

## 2018-03-09 ENCOUNTER — TRANSCRIBE ORDERS (OUTPATIENT)
Dept: PHYSICAL THERAPY | Facility: HOSPITAL | Age: 58
End: 2018-03-09

## 2018-03-09 DIAGNOSIS — M54.17 LUMBOSACRAL NEURITIS: Primary | ICD-10-CM

## 2018-03-12 ENCOUNTER — HOSPITAL ENCOUNTER (OUTPATIENT)
Dept: PHYSICAL THERAPY | Facility: HOSPITAL | Age: 58
Setting detail: THERAPIES SERIES
Discharge: HOME | End: 2018-03-12

## 2018-03-12 DIAGNOSIS — M54.17 LUMBOSACRAL NEURITIS: Primary | ICD-10-CM

## 2018-03-12 PROCEDURE — G0283 ELEC STIM OTHER THAN WOUND: HCPCS

## 2018-03-12 PROCEDURE — 97161 PT EVAL LOW COMPLEX 20 MIN: CPT

## 2018-03-12 NOTE — THERAPY EVALUATION
"    Outpatient Physical Therapy Ortho Initial Evaluation  [facility]     Patient Name: Brittni Hagen  : 1960  MRN: [id number]  Today's Date: 3/12/2018      Visit Date: 2018    Insurance [place] [employer]   Visit # 1/ (67 approved)   Next MD Visit 3/22/18   Recert Date 18     LBP with radiculopathy      Patient Active Problem List   Diagnosis   • Type 2 diabetes mellitus   • Hyperlipidemia   • Essential hypertension   • Gastroesophageal reflux disease without esophagitis        Past Medical History:   Diagnosis Date   • Alpha galactosidase deficiency     red meat allergy; \"alpha gal syndrome\"   • Anemia    • Arthritis    • Asthma    • Diabetes mellitus    • Endometriosis    • Fibromyalgia    • Goiter    • Hyperlipidemia    • Hypertension     takes bp meds to help kidneys   • Neuropathy    • Sleep apnea    • Tinnitus    • Vitamin D deficiency         Past Surgical History:   Procedure Laterality Date   • CARPAL TUNNEL RELEASE Bilateral    • LAMINECTOMY  2017   • LAPAROSCOPIC TUBAL LIGATION     • TUBAL ABDOMINAL LIGATION         Visit Dx:     ICD-10-CM ICD-9-CM   1. Lumbosacral neuritis M54.17 724.4             Patient History     Row Name 18 1500             History    Chief Complaint Pain   back  -WC      Type of Pain Back pain   thoracic and lumbar  -      Date Current Problem(s) Began 17  -      Brief Description of Current Complaint Patient underwent L4-L5 lumbar laminectomy on 2017 and reports having 2 seizures following surgery. Patient reports she has not gotten the pain relief from surgery that she anticipated and reports that it feels like her muscles are in a big knot in low back and reports main problem is almost like muscle cramping in R leg. Patient reports over the past 3 days or so, the muscle cramping in her R leg has gotten better. Patient reports she had to drive over to [place] for a family emergency and has returned to work.  -WC      " "Previous treatment for THIS PROBLEM Rehabilitation   [facility] [place]ert, new eval  -      Patient/Caregiver Goals Relieve pain  -      Patient's Rating of General Health Good  -      Occupation/sports/leisure activities social security administration  -      What clinical tests have you had for this problem? --   none since surgery  -         Pain     Pain Location Back   thoracic and lumbar  -      Pain at Present 10  -WC      Pain at Best 4  -WC      Pain at Worst 10  -      Pain Frequency Constant/continuous  -      Pain Description --   \"like someone's foot is on my back at all times.\"  -      What Performance Factors Make the Current Problem(s) WORSE? walking, standing, activity  -      What Performance Factors Make the Current Problem(s) BETTER? sitting propped up with pillows, physical therapy  -      Tolerance Time- Standing 30 min  -      Tolerance Time- Walking 20 min  -      Is your sleep disturbed? Yes  -         Fall Risk Assessment    Any falls in the past year: No  -         Daily Activities    Pt Participated in POC and Goals Yes  -        User Key  (r) = Recorded By, (t) = Taken By, (c) = Cosigned By    Initials Name Provider Type     Eliseo Campuzano PT Physical Therapist                PT Ortho     Row Name 03/12/18 1500       Subjective Comments    Subjective Comments See hx  -       Precautions and Contraindications    Precautions/Limitations lifting restrictions (specify in comments)   50#  -    Precautions seizure precautions  -    Contraindications S/P lumbar laminectomy on 12-  -       Subjective Pain    Able to rate subjective pain? yes  -    Pre-Treatment Pain Level 10  -    Post-Treatment Pain Level 6  -    Subjective Pain Comment decreased with heat and estim  -       Posture/Observations    Posture/Observations Comments Patient with severe tightness in lumbar and thoracic paraspinals  -       Quarter Clearing    Quarter " Clearing Lower Quarter Clearing  -       Neural Tension Signs- Lower Quarter Clearing    Slump Right:;Positive  -       Sensory Screen for Light Touch- Lower Quarter Clearing    L2 (anterior mid thigh) Intact  -WC    L3 (distal anterior thigh) Intact  -WC    L4 (medial lower leg/foot) Intact  -WC    L5 (lateral lower leg/great toe) Intact  -WC    S1 (bottom of foot) Intact  -       Lumbar ROM Screen- Lower Quarter Clearing    Lumbar Flexion Impaired   50% impairment with pain  -    Lumbar Extension Impaired   painful, ~ 10 deg of extension  -    Lumbar Lateral Flexion Impaired   painful  -       Special Tests/Palpation    Special Tests/Palpation Lumbar/SI  -       Lumbosacral Palpation    Piriformis Bilateral:;Tender  -    Quadratus Lumborum Bilateral:;Tender;Guarded/taut  -    Erector Spinae (Paraspinals) Tender;Bilateral:;Guarded/taut   T6-L5  -    Lumbosacral Palpation? Yes  -       Lumbar/SI Special Tests    Long Sit Test (Pelvic Malalignment) Bilateral:;Negative  -    SHERRIE (hip vs. SI Dysfunction) Bilateral:;Negative   Tightness on R only  -    Sacral Spring Test (SI Dysfunction) Bilateral:;Negative  -       General ROM    GENERAL ROM COMMENTS impaired lumbar AROM: see lumbar section for details  -       General Assessment (Manual Muscle Testing)    Comment, General Manual Muscle Testing (MMT) Assessment L LE MMT WFL globally; R LE MMT WFL excep hip musculature 4-/5 and ankle DF 4/5  -       Flexibility    Flexibility Tested? Lower Extremity  -       Lower Extremity Flexibility    Overall LE Flexibility Moderately limited;Bilateral:  -      User Key  (r) = Recorded By, (t) = Taken By, (c) = Cosigned By    Initials Name Provider Type    GALILEO Campuzano PT Physical Therapist                      Therapy Education  Education Details: Patient educated on PT POC and goals. Patient reports continued compliance with previous HEP, added SKTC to HEP. Also advised patient to purchase  a hot pack.  Given: HEP, Symptoms/condition management  Program: New  How Provided: Verbal, Written  Provided to: Patient  Level of Understanding: Verbalized, Demonstrated           PT OP Goals     Row Name 03/12/18 1600          PT Short Term Goals    STG Date to Achieve 04/02/18  -     STG 1 Patient will report a 50% subjective improvement  -     STG 2 Patient will report pain </= 5/10 at all times  -WC     STG 3 Patient will improve lumbar AROM to 75% of full range or greater  -        Long Term Goals    LTG Date to Achieve 04/23/18  -WC     LTG 1 Patient will be indepedent with HEP  -     LTG 2 Patient will improve R hip flexion and ABD MMT from to 4+/5  -WC     LTG 3 Patient will have lumbar AROM all planes WFL and pain </= 1/10  -     LTG 4 Patient will improve modified Oswestry from 27/50 to </= 15/50  -WC     LTG 5 Patient will improve B HS flexibility to WFL  -     LTG 6 Patient will be able to ambulate > 30 min with pain </= 2/10  -        Time Calculation    PT Goal Re-Cert Due Date 04/02/18  -       User Key  (r) = Recorded By, (t) = Taken By, (c) = Cosigned By    Initials Name Provider Type    GALILEO Campuzano PT Physical Therapist                PT Assessment/Plan     Row Name 03/12/18 1600          PT Assessment    Functional Limitations Impaired gait;Limitation in home management;Limitations in functional capacity and performance;Limitations in community activities;Performance in self-care ADL;Performance in work activities  -     Impairments Endurance;Gait;Muscle strength;Pain;Posture;Poor body mechanics;Range of motion;Sensation  -     Assessment Comments Patient is a 57 year old female with previous hx of L4-L5 lumbar laminectomy in Dec 2017. Patient presented to PT today with severe tightness of lumbar and thoracic paraspinals. decreased lumbar AROM secondary to pain, decreased B LE flexibility, and negative special testing except for lumbar radiiculopathy down R LE. Patient  would benefit from skilled PT services to decrease pain and improve flexibility for improved quality of life.   -     Please refer to paper survey for additional self-reported information Yes  -WC     Rehab Potential Good  -WC     Patient/caregiver participated in establishment of treatment plan and goals Yes  -WC     Patient would benefit from skilled therapy intervention Yes  -WC        PT Plan    PT Frequency 2x/week  -WC     Predicted Duration of Therapy Intervention (OT Eval) 4-6 weeks  -WC     Planned CPT's? PT EVAL LOW COMPLEXITY: 98988;PT RE-EVAL: 67595;PT THER PROC EA 15 MIN: 12014;PT THER ACT EA 15 MIN: 03044;PT MANUAL THERAPY EA 15 MIN: 92372;PT AQUATIC THERAPY EA 15 MIN: 10312;PT SELF CARE/HOME MGMT/TRAIN EA 15: 34689;PT ELECTRICAL STIM UNATTEND: ;PT ULTRASOUND EA 15 MIN: 42695;PT THER SUPP EA 15 MIN  -WC     PT Plan Comments PT POC - lumbar/thoracic stretching, B LE stretching program, manual therapy including STM of lumbar and thoracic paraspinals, core stabalization, possible aquatic therapy if patient agreeable, modalities for pain prn (heat and estim)  -WC       User Key  (r) = Recorded By, (t) = Taken By, (c) = Cosigned By    Initials Name Provider Type    GALILEO Campuzano PT Physical Therapist                Modalities     Row Name 03/12/18 1500             Moist Heat    MH Applied Yes  -WC      Location lumbar - thoracic region concurrent with IFC  -WC      Rx Minutes 15 mins  -WC         ELECTRICAL STIMULATION    Attended/Unattended Unattended  -      Stimulation Type IFC  -WC      Location/Electrode Placement/Other lumbar-thoracic region  -WC      Rx Minutes 15 mins  -WC        User Key  (r) = Recorded By, (t) = Taken By, (c) = Cosigned By    Initials Name Provider Type    GALILEO Campuzano PT Physical Therapist              Exercises     Row Name 03/12/18 1500             Subjective Comments    Subjective Comments See hx  -WC         Subjective Pain    Able to rate subjective pain? yes   -      Pre-Treatment Pain Level 10  -WC      Post-Treatment Pain Level 6  -WC      Subjective Pain Comment decreased with heat and estim  -        User Key  (r) = Recorded By, (t) = Taken By, (c) = Cosigned By    Initials Name Provider Type    GALILEO Campuzano PT Physical Therapist                        Outcome Measure Options: Modifed Owestry  Modified Oswestry  Modified Oswestry Score/Comments: 27/50 = 54% impairment      Time Calculation:   Start Time: 1517  Stop Time: 1605  Time Calculation (min): 48 min  Total Timed Code Minutes- PT: 0 minute(s)     Therapy Charges for Today     Code Description Service Date Service Provider Modifiers Qty    67663896964 HC PT ELECTRICAL STIM UNATTENDED 3/12/2018 Eliseo Campuzano, PT  1    83370957773 HC PT THER SUPP EA 15 MIN 3/12/2018 Eliseo Campuzano, PT GP 1    59978070884 HC PT AQUA EVAL LOW COMPLEXITY 2 3/12/2018 Eliseo Campuzano, PT GP 1                   Eliseo Campuzano PT  3/12/2018

## 2018-03-19 ENCOUNTER — HOSPITAL ENCOUNTER (OUTPATIENT)
Dept: PHYSICAL THERAPY | Facility: HOSPITAL | Age: 58
Setting detail: THERAPIES SERIES
Discharge: HOME | End: 2018-03-19

## 2018-03-19 DIAGNOSIS — M54.41 CHRONIC LOW BACK PAIN WITH RIGHT-SIDED SCIATICA, UNSPECIFIED BACK PAIN LATERALITY: ICD-10-CM

## 2018-03-19 DIAGNOSIS — M54.17 LUMBOSACRAL NEURITIS: Primary | ICD-10-CM

## 2018-03-19 DIAGNOSIS — M54.17 LUMBOSACRAL RADICULOPATHY: ICD-10-CM

## 2018-03-19 DIAGNOSIS — G89.29 CHRONIC LOW BACK PAIN WITH RIGHT-SIDED SCIATICA, UNSPECIFIED BACK PAIN LATERALITY: ICD-10-CM

## 2018-03-19 PROCEDURE — G0283 ELEC STIM OTHER THAN WOUND: HCPCS

## 2018-03-19 PROCEDURE — 97110 THERAPEUTIC EXERCISES: CPT

## 2018-03-19 NOTE — THERAPY TREATMENT NOTE
"    Outpatient Physical Therapy Ortho Treatment Note  [facility]     Patient Name: Brittni Hagen  : 1960  MRN: [id number]  Today's Date: 3/19/2018      Visit Date: 2018     Subjective Improvement 0  Visits 2/2  Visits approved med nec  RTMD 3-  recert Date 2018    S/P lumbarlaminectomy on 2017  Post op 14 weeks, 5 days      Visit Dx:    ICD-10-CM ICD-9-CM   1. Lumbosacral neuritis M54.17 724.4   2. Lumbosacral radiculopathy M54.17 724.4   3. Chronic low back pain with right-sided sciatica, unspecified back pain laterality M54.41 724.2    G89.29 724.3     338.29       Patient Active Problem List   Diagnosis   • Type 2 diabetes mellitus   • Hyperlipidemia   • Essential hypertension   • Gastroesophageal reflux disease without esophagitis        Past Medical History:   Diagnosis Date   • Alpha galactosidase deficiency     red meat allergy; \"alpha gal syndrome\"   • Anemia    • Arthritis    • Asthma    • Diabetes mellitus    • Endometriosis    • Fibromyalgia    • Goiter    • Hyperlipidemia    • Hypertension     takes bp meds to help kidneys   • Neuropathy    • Sleep apnea    • Tinnitus    • Vitamin D deficiency         Past Surgical History:   Procedure Laterality Date   • CARPAL TUNNEL RELEASE Bilateral    • LAMINECTOMY  2017   • LAPAROSCOPIC TUBAL LIGATION     • TUBAL ABDOMINAL LIGATION               PT Ortho     Row Name 18 1600       Precautions and Contraindications    Precautions/Limitations lifting restrictions (specify in comments)   50 lb  -CP    Precautions seizure precautions  -CP    Contraindications s/p lumbarlaminectomy on 2017  -CP       Posture/Observations    Posture/Observations Comments 14 weeks 5 days post op  -CP      User Key  (r) = Recorded By, (t) = Taken By, (c) = Cosigned By    Initials Name Provider Type    DAGO Zamora PTA Physical Therapy Assistant                            PT Assessment/Plan     Row Name 18 " 0631          PT Assessment    Assessment Comments decrease HS flexiblity.    -CP        PT Plan    PT Frequency 2x/week  -CP     PT Plan Comments aquatics next visit  -CP        Clinical Impression    Predicted Duration of Therapy Intervention (days/wks) 4-6 weeks  -CP       User Key  (r) = Recorded By, (t) = Taken By, (c) = Cosigned By    Initials Name Provider Type    CP Rachel Zamora PTA Physical Therapy Assistant                Modalities     Row Name 03/19/18 1600             Moist Heat    MH Applied Yes  -CP      Location low back in prove+   20 minutes with IFC  -CP      Rx Minutes --   20 minutes with iFC  -CP         ELECTRICAL STIMULATION    Attended/Unattended Unattended  -CP      Stimulation Type IFC  -CP      Location/Electrode Placement/Other low back  -CP      Rx Minutes 20 mins  -CP        User Key  (r) = Recorded By, (t) = Taken By, (c) = Cosigned By    Initials Name Provider Type    CP Rachel Zamora PTA Physical Therapy Assistant                Exercises     Row Name 03/19/18 1600             Subjective Comments    Subjective Comments Patient states that she has returned to work.  Her MD has not sent orders for a different work chair.  -CP         Subjective Pain    Able to rate subjective pain? yes  -CP      Pre-Treatment Pain Level 7  -CP      Post-Treatment Pain Level 4  -CP         Exercise 1    Exercise Name 1 Pro II level 3  -CP      Time 1 10  -CP         Exercise 2    Exercise Name 2 incline stretch  -CP      Sets 2 3  -CP      Time 2 30  -CP         Exercise 3    Exercise Name 3 Standing HS stretch  -CP      Sets 3 3  -CP      Additional Comments 30  -CP         Exercise 4    Exercise Name 4 seated piriformis stretch  -CP      Sets 4 3  -CP      Time 4 30  -CP         Exercise 5    Exercise Name 5 seated mid back stretch with tball  -CP      Reps 5 10  -CP      Time 5 20  -CP         Exercise 6    Exercise Name 6 open book stretch  -CP      Cueing 6 Verbal;Tactile  -CP      Sets 6  +  -CP      Reps 6 5  -CP      Time 6 20  -CP      Additional Comments modifiied  -CP         Exercise 7    Exercise Name 7 DKTC with tball  -CP      Reps 7 10  -CP      Time 7 20  -CP         Exercise 8    Exercise Name 8 LTR stretch  -CP      Reps 8 30  -CP        User Key  (r) = Recorded By, (t) = Taken By, (c) = Cosigned By    Initials Name Provider Type    CP Rachel Zamora PTA Physical Therapy Assistant                               PT OP Goals     Row Name 03/19/18 1600 03/19/18 1500       PT Short Term Goals    STG Date to Achieve  -- 04/02/18  -CP    STG 1  -- Patient will report a 50% subjective improvement  -CP    STG 1 Progress  -- Progressing  -CP    STG 2  -- Patient will report pain </= 5/10 at all times  -CP    STG 2 Progress  -- Not Met  -CP    STG 3  -- Patient will improve lumbar AROM to 75% of full range or greater  -CP    STG 3 Progress  -- Progressing  -CP       Long Term Goals    LTG Date to Achieve  -- 04/23/18  -CP    LTG 1  -- Patient will be indepedent with HEP  -CP    LTG 1 Progress  -- Ongoing  -CP    LTG 2  -- Patient will improve R hip flexion and ABD MMT from to 4+/5  -CP    LTG 2 Progress  -- Progressing  -CP    LTG 3  -- Patient will have lumbar AROM all planes WFL and pain </= 1/10  -CP    LTG 3 Progress  -- Progressing  -CP    LTG 4  -- Patient will improve modified Oswestry from 27/50 to </= 15/50  -CP    LTG 4 Progress  -- Not Met  -CP    LTG 5  -- Patient will improve B HS flexibility to WFL  -CP    LTG 5 Progress  -- Progressing  -CP    LTG 6  -- Patient will be able to ambulate > 30 min with pain </= 2/10  -CP    LTG 6 Progress  -- Progressing  -CP       Time Calculation    PT Goal Re-Cert Due Date 04/02/18  -CP  --      User Key  (r) = Recorded By, (t) = Taken By, (c) = Cosigned By    Initials Name Provider Type    CP Rachel Zamora PTA Physical Therapy Assistant          Therapy Education  Education Details: piriformis and open book stretch  Given: HEP  Program:  New  How Provided: Verbal, Demonstration, Written  Provided to: Patient  Level of Understanding: Verbalized, Demonstrated              Time Calculation:   Start Time: 1600  Stop Time: 1710  Time Calculation (min): 70 min  Total Timed Code Minutes- PT: 45 minute(s)    Therapy Charges for Today     Code Description Service Date Service Provider Modifiers Qty    17439211375 HC PT THER PROC EA 15 MIN 3/19/2018 Rachel Zamora PTA GP 3    37871069154 HC PT ELECTRICAL STIM UNATTENDED 3/19/2018 Rachel Zamora PTA  1    72311395573 HC PT THER SUPP EA 15 MIN 3/19/2018 Rachel Zamora PTA GP 1                    Rachel Zamora PTA  3/19/2018

## 2018-03-21 ENCOUNTER — HOSPITAL ENCOUNTER (OUTPATIENT)
Dept: PHYSICAL THERAPY | Facility: HOSPITAL | Age: 58
Setting detail: THERAPIES SERIES
Discharge: HOME | End: 2018-03-21

## 2018-03-21 PROCEDURE — 97113 AQUATIC THERAPY/EXERCISES: CPT

## 2018-03-21 NOTE — THERAPY TREATMENT NOTE
"    Outpatient Physical Therapy Ortho Treatment Note  [facility]     Patient Name: Brittni Hagen  : 1960  MRN: [id number]  Today's Date: 3/21/2018      Visit Date: 2018  Pt. Has attended 3/3 visits  Recert 18  14 weeks post op surgery 17  Visit Dx:  No diagnosis found.    Patient Active Problem List   Diagnosis   • Type 2 diabetes mellitus   • Hyperlipidemia   • Essential hypertension   • Gastroesophageal reflux disease without esophagitis        Past Medical History:   Diagnosis Date   • Alpha galactosidase deficiency     red meat allergy; \"alpha gal syndrome\"   • Anemia    • Arthritis    • Asthma    • Diabetes mellitus    • Endometriosis    • Fibromyalgia    • Goiter    • Hyperlipidemia    • Hypertension     takes bp meds to help kidneys   • Neuropathy    • Sleep apnea    • Tinnitus    • Vitamin D deficiency         Past Surgical History:   Procedure Laterality Date   • CARPAL TUNNEL RELEASE Bilateral    • LAMINECTOMY  2017   • LAPAROSCOPIC TUBAL LIGATION     • TUBAL ABDOMINAL LIGATION               PT Ortho     Row Name 18 1600       Precautions and Contraindications    Precautions/Limitations lifting restrictions (specify in comments)   50 lb  -CP    Precautions seizure precautions  -CP    Contraindications s/p lumbarlaminectomy on 2017  -CP       Posture/Observations    Posture/Observations Comments 14 weeks 5 days post op  -CP      User Key  (r) = Recorded By, (t) = Taken By, (c) = Cosigned By    Initials Name Provider Type    DAGO Zamora PTA Physical Therapy Assistant                            PT Assessment/Plan     Row Name 18 1702          PT Assessment    Assessment Comments Tolerates pool well fatigues easily  -SP        PT Plan    PT Frequency 2x/week  -SP     PT Plan Comments Continue with POC 1 land 1 pool  -SP        Clinical Impression    Predicted Duration of Therapy Intervention (days/wks) 4-6 weeks  -SP       User Key  " (r) = Recorded By, (t) = Taken By, (c) = Cosigned By    Initials Name Provider Type    ADILSON Leija PTA Physical Therapy Assistant                    Exercises     Row Name 03/21/18 1600             Subjective Comments    Subjective Comments Pt. notes thst her back is painful has been at work all day  -SP         Subjective Pain    Able to rate subjective pain? yes  -SP      Pre-Treatment Pain Level 7  -SP      Post-Treatment Pain Level 4  -SP         Aquatics    Aquatics performed? Yes  -SP         Exercise 1    Exercise Name 1 3 way walk   -SP      Time 1 15 min  -SP         Exercise 2    Exercise Name 2 3 way SLR  -SP      Reps 2 10  -SP         Exercise 3    Exercise Name 3 seated LE nerve glides seated on step  -SP      Reps 3 10  -SP         Exercise 4    Exercise Name 4 seated piriformis S  -SP      Reps 4 3  -SP      Time 4 30 sec  -SP         Exercise 5    Exercise Name 5 toe raises/minisquats  -SP         Exercise 6    Exercise Name 6 deep end hang  -SP      Time 6 7 min  -SP        User Key  (r) = Recorded By, (t) = Taken By, (c) = Cosigned By    Initials Name Provider Type    ADILSON Leija PTA Physical Therapy Assistant                               PT OP Goals     Row Name 03/21/18 1702 03/21/18 1700       PT Short Term Goals    STG Date to Achieve  -- 04/02/18  -SP    STG 1  -- Patient will report a 50% subjective improvement  -SP    STG 1 Progress  -- Progressing  -SP    STG 2  -- Patient will report pain </= 5/10 at all times  -SP    STG 2 Progress  -- Not Met  -SP    STG 3  -- Patient will improve lumbar AROM to 75% of full range or greater  -SP    STG 3 Progress  -- Progressing  -SP       Long Term Goals    LTG Date to Achieve  -- 04/23/18  -SP    LTG 1  -- Patient will be indepedent with HEP  -SP    LTG 1 Progress  -- Ongoing  -SP    LTG 2  -- Patient will improve R hip flexion and ABD MMT from to 4+/5  -SP    LTG 2 Progress  -- Progressing  -SP    LTG 3  -- Patient will have lumbar  AROM all planes WFL and pain </= 1/10  -SP    LTG 3 Progress  -- Progressing  -SP    LTG 4  -- Patient will improve modified Oswestry from 27/50 to </= 15/50  -SP    LTG 4 Progress  -- Not Met  -SP    LTG 5  -- Patient will improve B HS flexibility to WFL  -SP    LTG 5 Progress  -- Progressing  -SP    LTG 6  -- Patient will be able to ambulate > 30 min with pain </= 2/10  -SP    LTG 6 Progress  -- Progressing  -SP       Time Calculation    PT Goal Re-Cert Due Date 04/02/18  -SP 04/02/18  -      User Key  (r) = Recorded By, (t) = Taken By, (c) = Cosigned By    Initials Name Provider Type    SP Yuliya Leija PTA Physical Therapy Assistant                         Time Calculation:   Start Time: 1600  Stop Time: 1640  Time Calculation (min): 40 min  Total Timed Code Minutes- PT: 40 minute(s)    Therapy Charges for Today     Code Description Service Date Service Provider Modifiers Qty    35816587796  PT AQUATIC THERAPY EA 15 MIN 3/21/2018 Yuliya Leija PTA GP 3                    Yuliya Leija PTA  3/21/2018

## 2018-03-26 ENCOUNTER — HOSPITAL ENCOUNTER (OUTPATIENT)
Dept: PHYSICAL THERAPY | Facility: HOSPITAL | Age: 58
Setting detail: THERAPIES SERIES
Discharge: HOME | End: 2018-03-26

## 2018-03-26 DIAGNOSIS — M54.41 CHRONIC LOW BACK PAIN WITH RIGHT-SIDED SCIATICA, UNSPECIFIED BACK PAIN LATERALITY: ICD-10-CM

## 2018-03-26 DIAGNOSIS — M54.17 LUMBOSACRAL NEURITIS: Primary | ICD-10-CM

## 2018-03-26 DIAGNOSIS — M54.17 LUMBOSACRAL RADICULOPATHY: ICD-10-CM

## 2018-03-26 DIAGNOSIS — G89.29 CHRONIC LOW BACK PAIN WITH RIGHT-SIDED SCIATICA, UNSPECIFIED BACK PAIN LATERALITY: ICD-10-CM

## 2018-03-26 PROCEDURE — G0283 ELEC STIM OTHER THAN WOUND: HCPCS

## 2018-03-26 PROCEDURE — 97110 THERAPEUTIC EXERCISES: CPT

## 2018-03-26 PROCEDURE — 97035 APP MDLTY 1+ULTRASOUND EA 15: CPT

## 2018-03-26 NOTE — THERAPY TREATMENT NOTE
"    Outpatient Physical Therapy Ortho Treatment Note  [facility]     Patient Name: Brittni Hagen  : 1960  MRN: [id number]  Today's Date: 3/26/2018      Visit Date: 2018     Subjective Improvement not sure  Visits   Visits approved 75 per year  RTMD PRN  recert Date 2018      Visit Dx:    ICD-10-CM ICD-9-CM   1. Lumbosacral neuritis M54.17 724.4   2. Lumbosacral radiculopathy M54.17 724.4   3. Chronic low back pain with right-sided sciatica, unspecified back pain laterality M54.41 724.2    G89.29 724.3     338.29       Patient Active Problem List   Diagnosis   • Type 2 diabetes mellitus   • Hyperlipidemia   • Essential hypertension   • Gastroesophageal reflux disease without esophagitis        Past Medical History:   Diagnosis Date   • Alpha galactosidase deficiency     red meat allergy; \"alpha gal syndrome\"   • Anemia    • Arthritis    • Asthma    • Diabetes mellitus    • Endometriosis    • Fibromyalgia    • Goiter    • Hyperlipidemia    • Hypertension     takes bp meds to help kidneys   • Neuropathy    • Sleep apnea    • Tinnitus    • Vitamin D deficiency         Past Surgical History:   Procedure Laterality Date   • CARPAL TUNNEL RELEASE Bilateral    • LAMINECTOMY  2017   • LAPAROSCOPIC TUBAL LIGATION     • TUBAL ABDOMINAL LIGATION               PT Ortho     Row Name 18 1600       Precautions and Contraindications    Precautions/Limitations lifting restrictions (specify in comments)   lifting restriction no greater than 50 lb  -CP    Precautions seizure precautions  -CP    Contraindications S/P lumbar laminectomy on 2017  -CP       Posture/Observations    Posture/Observations Comments no back brace  -CP      User Key  (r) = Recorded By, (t) = Taken By, (c) = Cosigned By    Initials Name Provider Type    CP Rachel Zamora PTA Physical Therapy Assistant                            PT Assessment/Plan     Row Name 18 3977          PT Assessment    " Assessment Comments Good tolerance to US.  -CP        PT Plan    PT Frequency 2x/week  -CP     PT Plan Comments aquatics next visit focus on core stab  -CP        Clinical Impression    Predicted Duration of Therapy Intervention (days/wks) 4-6 weeks  -CP       User Key  (r) = Recorded By, (t) = Taken By, (c) = Cosigned By    Initials Name Provider Type    CP Rachel Zamora PTA Physical Therapy Assistant                Modalities     Row Name 03/26/18 1600             Moist Heat    MH Applied Yes  -CP      Location low back in prone+  -CP      Rx Minutes --   20 minutes with IFC  -CP         Ultrasound 89140    Location low back paraspinals  -CP      Rx Minutes 8  -CP      Duty Cycle 100  -CP      Frequency 1.0 MHz  -CP      Intensity - Wts/cm 1.5  -CP         ELECTRICAL STIMULATION    Attended/Unattended Unattended  -CP      Stimulation Type IFC  -CP      Location/Electrode Placement/Other low back  -CP      Rx Minutes 20 mins  -CP        User Key  (r) = Recorded By, (t) = Taken By, (c) = Cosigned By    Initials Name Provider Type    CP Rachel Zamora PTA Physical Therapy Assistant                Exercises     Row Name 03/26/18 1600             Subjective Comments    Subjective Comments Patient states that she received an injection last thursday which caused her blook sugar to increase.  MD ordered her a back brace to wear while at work.  today was the first day that she has worn the back brace for 8 hours.  Patient just feels very stiff today  -CP         Subjective Pain    Able to rate subjective pain? yes  -CP      Pre-Treatment Pain Level 7  -CP         Aquatics    Aquatics performed? No  -CP         Exercise 1    Exercise Name 1 Pro II level 2  -CP      Time 1 10  -CP         Exercise 2    Exercise Name 2 standing HS Stretch  -CP      Sets 2 3  -CP      Time 2 30  -CP         Exercise 3    Exercise Name 3 seated piriformis stretch  -CP      Sets 3 3  -CP      Time 3 30  -CP         Exercise 4    Exercise  Name 4 DKTC with tball  -CP      Sets 4 10  -CP      Time 4 20  -CP         Exercise 5    Exercise Name 5 open book  -CP      Sets 5 5  -CP      Time 5 20 bilateral  -CP         Exercise 6    Exercise Name 6 US see modalities  -CP        User Key  (r) = Recorded By, (t) = Taken By, (c) = Cosigned By    Initials Name Provider Type    CP Rachel Zamora PTA Physical Therapy Assistant                               PT OP Goals     Row Name 03/26/18 1700          PT Short Term Goals    STG Date to Achieve 04/02/18  -CP     STG 1 Patient will report a 50% subjective improvement  -CP     STG 1 Progress Progressing  -CP     STG 2 Patient will report pain </= 5/10 at all times  -CP     STG 2 Progress Not Met  -CP     STG 3 Patient will improve lumbar AROM to 75% of full range or greater  -CP     STG 3 Progress Progressing  -CP        Long Term Goals    LTG Date to Achieve 04/23/18  -CP     LTG 1 Patient will be indepedent with HEP  -CP     LTG 1 Progress Ongoing  -CP     LTG 2 Patient will improve R hip flexion and ABD MMT from to 4+/5  -CP     LTG 2 Progress Progressing  -CP     LTG 3 Patient will have lumbar AROM all planes WFL and pain </= 1/10  -CP     LTG 3 Progress Progressing  -CP     LTG 4 Patient will improve modified Oswestry from 27/50 to </= 15/50  -CP     LTG 4 Progress Not Met  -CP     LTG 5 Patient will improve B HS flexibility to WFL  -CP     LTG 5 Progress Progressing  -CP     LTG 6 Patient will be able to ambulate > 30 min with pain </= 2/10  -CP     LTG 6 Progress Progressing  -CP        Time Calculation    PT Goal Re-Cert Due Date 04/02/18  -CP       User Key  (r) = Recorded By, (t) = Taken By, (c) = Cosigned By    Initials Name Provider Type    CP Rachel Zamora PTA Physical Therapy Assistant                         Time Calculation:   Start Time: 1600  Stop Time: 1705  Time Calculation (min): 65 min  Total Timed Code Minutes- PT: 45 minute(s)    Therapy Charges for Today     Code Description  Service Date Service Provider Modifiers Qty    19084835307 HC PT ULTRASOUND EA 15 MIN 3/26/2018 Rachel Zamora, PTA GP 1    79990435105 HC PT ELECTRICAL STIM UNATTENDED 3/26/2018 Rachel Zamora, BUZZ  1    89705268922 HC PT THER SUPP EA 15 MIN 3/26/2018 Rachel Zamora PTA GP 1    71267167387 HC PT THER PROC EA 15 MIN 3/26/2018 Rachel Zamora PTA GP 2                    Rachel Zamora PTA  3/26/2018

## 2018-03-29 ENCOUNTER — HOSPITAL ENCOUNTER (OUTPATIENT)
Dept: PHYSICAL THERAPY | Facility: HOSPITAL | Age: 58
Setting detail: THERAPIES SERIES
Discharge: HOME | End: 2018-03-29

## 2018-03-29 DIAGNOSIS — G89.29 CHRONIC LOW BACK PAIN WITH RIGHT-SIDED SCIATICA, UNSPECIFIED BACK PAIN LATERALITY: ICD-10-CM

## 2018-03-29 DIAGNOSIS — M54.41 CHRONIC LOW BACK PAIN WITH RIGHT-SIDED SCIATICA, UNSPECIFIED BACK PAIN LATERALITY: ICD-10-CM

## 2018-03-29 DIAGNOSIS — M54.17 LUMBOSACRAL NEURITIS: Primary | ICD-10-CM

## 2018-03-29 DIAGNOSIS — M54.17 LUMBOSACRAL RADICULOPATHY: ICD-10-CM

## 2018-03-29 PROCEDURE — 97110 THERAPEUTIC EXERCISES: CPT

## 2018-03-29 NOTE — THERAPY TREATMENT NOTE
"    Outpatient Physical Therapy Ortho Treatment Note  [facility]     Patient Name: Brittni Hagen  : 1960  MRN: [id number]  Today's Date: 3/29/2018      Visit Date: 2018    Subjective improvement some  Visits 5/5  Visits approved med nec  RTMD PRN  recert 2018    S/P lumbarlaminectomy on 2018     Visit Dx:    ICD-10-CM ICD-9-CM   1. Lumbosacral neuritis M54.17 724.4   2. Lumbosacral radiculopathy M54.17 724.4   3. Chronic low back pain with right-sided sciatica, unspecified back pain laterality M54.41 724.2    G89.29 724.3     338.29       Patient Active Problem List   Diagnosis   • Type 2 diabetes mellitus   • Hyperlipidemia   • Essential hypertension   • Gastroesophageal reflux disease without esophagitis        Past Medical History:   Diagnosis Date   • Alpha galactosidase deficiency     red meat allergy; \"alpha gal syndrome\"   • Anemia    • Arthritis    • Asthma    • Diabetes mellitus    • Endometriosis    • Fibromyalgia    • Goiter    • Hyperlipidemia    • Hypertension     takes bp meds to help kidneys   • Neuropathy    • Sleep apnea    • Tinnitus    • Vitamin D deficiency         Past Surgical History:   Procedure Laterality Date   • CARPAL TUNNEL RELEASE Bilateral    • LAMINECTOMY  2017   • LAPAROSCOPIC TUBAL LIGATION     • TUBAL ABDOMINAL LIGATION                               PT Assessment/Plan     Row Name 18 1657          PT Assessment    Assessment Comments Only a slight decrease in pain with aquatics this date  -CP        PT Plan    PT Frequency 2x/week  -CP     PT Plan Comments cont with poc  -CP        Clinical Impression    Predicted Duration of Therapy Intervention (days/wks) 4-6 weeks  -CP       User Key  (r) = Recorded By, (t) = Taken By, (c) = Cosigned By    Initials Name Provider Type    CP Rachel Zamora PTA Physical Therapy Assistant                    Exercises     Row Name 18 1600             Subjective Comments    Subjective " Comments Patient states that she was able to sleep for 4 hous straight last night.  when she woke up with a sharp pain in her right low back and hip  -CP         Subjective Pain    Able to rate subjective pain? yes  -CP      Pre-Treatment Pain Level 4  -CP      Post-Treatment Pain Level 3  -CP         Aquatics    Aquatics performed? Yes  -CP         Exercise 1    Exercise Name 1 walk 3 way  -CP      Time 1 5 min each  -CP         Exercise 2    Exercise Name 2 CR/TR  -CP      Reps 2 20  -CP         Exercise 3    Exercise Name 3 mini squats  -CP      Reps 3 20  -CP         Exercise 4    Exercise Name 4 hip 3 way  -CP      Reps 4 20  -CP      Time 4 bilatera  -CP         Exercise 5    Exercise Name 5 trunk rotation with cane  -CP      Reps 5 20  -CP         Exercise 6    Exercise Name 6 paddle wheel with cane CW/CCW  -CP      Reps 6 20  -CP         Exercise 7    Exercise Name 7 deep hang bike  -CP      Time 7 5  -CP         Exercise 8    Exercise Name 8 deep hang  -CP      Time 8 10  -CP        User Key  (r) = Recorded By, (t) = Taken By, (c) = Cosigned By    Initials Name Provider Type    CP Rachel Zamora, PTA Physical Therapy Assistant                               PT OP Goals     Row Name 03/29/18 1600          PT Short Term Goals    STG Date to Achieve 04/02/18  -CP     STG 1 Patient will report a 50% subjective improvement  -CP     STG 1 Progress Progressing  -CP     STG 2 Patient will report pain </= 5/10 at all times  -CP     STG 2 Progress Not Met  -CP     STG 3 Patient will improve lumbar AROM to 75% of full range or greater  -CP     STG 3 Progress Progressing  -CP        Long Term Goals    LTG Date to Achieve 04/23/18  -CP     LTG 1 Patient will be indepedent with HEP  -CP     LTG 1 Progress Ongoing  -CP     LTG 2 Patient will improve R hip flexion and ABD MMT from to 4+/5  -CP     LTG 2 Progress Progressing  -CP     LTG 3 Patient will have lumbar AROM all planes WFL and pain </= 1/10  -CP     LTG 3  Progress Progressing  -CP     LTG 4 Patient will improve modified Oswestry from 27/50 to </= 15/50  -CP     LTG 4 Progress Not Met  -CP     LTG 5 Patient will improve B HS flexibility to WFL  -CP     LTG 5 Progress Progressing  -CP     LTG 6 Patient will be able to ambulate > 30 min with pain </= 2/10  -CP     LTG 6 Progress Progressing  -CP        Time Calculation    PT Goal Re-Cert Due Date 04/02/18  -CP       User Key  (r) = Recorded By, (t) = Taken By, (c) = Cosigned By    Initials Name Provider Type    CP Rachel Zamora PTA Physical Therapy Assistant                         Time Calculation:   Start Time: 1600  Stop Time: 1645  Time Calculation (min): 45 min  Total Timed Code Minutes- PT: 45 minute(s)    Therapy Charges for Today     Code Description Service Date Service Provider Modifiers Qty    78075817991 HC PT THER PROC EA 15 MIN 3/29/2018 Rachel Zamora PTA GP 3                    Rachel Zamora PTA  3/29/2018

## 2018-04-04 ENCOUNTER — HOSPITAL ENCOUNTER (OUTPATIENT)
Dept: PHYSICAL THERAPY | Facility: HOSPITAL | Age: 58
Setting detail: THERAPIES SERIES
Discharge: HOME | End: 2018-04-04

## 2018-04-04 DIAGNOSIS — M54.17 LUMBOSACRAL NEURITIS: Primary | ICD-10-CM

## 2018-04-04 DIAGNOSIS — M54.17 LUMBOSACRAL RADICULOPATHY: ICD-10-CM

## 2018-04-04 DIAGNOSIS — M54.41 CHRONIC LOW BACK PAIN WITH RIGHT-SIDED SCIATICA, UNSPECIFIED BACK PAIN LATERALITY: ICD-10-CM

## 2018-04-04 DIAGNOSIS — G89.29 CHRONIC LOW BACK PAIN WITH RIGHT-SIDED SCIATICA, UNSPECIFIED BACK PAIN LATERALITY: ICD-10-CM

## 2018-04-04 PROCEDURE — 97110 THERAPEUTIC EXERCISES: CPT

## 2018-04-04 PROCEDURE — G0283 ELEC STIM OTHER THAN WOUND: HCPCS

## 2018-04-04 NOTE — THERAPY TREATMENT NOTE
"    Outpatient Physical Therapy Ortho Treatment Note  [facility]     Patient Name: Brittni Hagen  : 1960  MRN: [id number]  Today's Date: 2018      Visit Date: 2018     Subjective Improvement: \"some\"  Attendance:   ([facility])  Next MD Visit : PRN  Recert Date:  18      Therapy Diagnosis:  S/P lumbarlaminectomy on 2018         Visit Dx:    ICD-10-CM ICD-9-CM   1. Lumbosacral neuritis M54.17 724.4   2. Lumbosacral radiculopathy M54.17 724.4   3. Chronic low back pain with right-sided sciatica, unspecified back pain laterality M54.41 724.2    G89.29 724.3     338.29       Patient Active Problem List   Diagnosis   • Type 2 diabetes mellitus   • Hyperlipidemia   • Essential hypertension   • Gastroesophageal reflux disease without esophagitis        Past Medical History:   Diagnosis Date   • Alpha galactosidase deficiency     red meat allergy; \"alpha gal syndrome\"   • Anemia    • Arthritis    • Asthma    • Diabetes mellitus    • Endometriosis    • Fibromyalgia    • Goiter    • Hyperlipidemia    • Hypertension     takes bp meds to help kidneys   • Neuropathy    • Sleep apnea    • Tinnitus    • Vitamin D deficiency         Past Surgical History:   Procedure Laterality Date   • CARPAL TUNNEL RELEASE Bilateral    • LAMINECTOMY  2017   • LAPAROSCOPIC TUBAL LIGATION     • TUBAL ABDOMINAL LIGATION               PT Ortho     Row Name 18 1556       Precautions and Contraindications    Precautions/Limitations lifting restrictions (specify in comments)   No greater than 50#  -    Precautions seizure precautions  -    Contraindications S/P lumbar laminectomy on 2017  -       Posture/Observations    Posture/Observations Comments No Back Brace; lumbar paraspinal tightness noted R greater than left this date; slight antalgia with gait.   -      User Key  (r) = Recorded By, (t) = Taken By, (c) = Cosigned By    Initials Name Provider Type    CLEMENTE Osman PTA " Physical Therapy Assistant                            PT Assessment/Plan     Row Name 04/04/18 3983          PT Assessment    Assessment Comments decreased treatment this date secondary to pt's increased pain this date.  focused on modalities to help aide with pain control.   -        PT Plan    PT Frequency 2x/week  -     Predicted Duration of Therapy Intervention (OT Eval) 4-6 wks  -     PT Plan Comments Recheck scheduled for next week; Continue as advised with core stability and LE strength as able.   -CLEMENTE        Clinical Impression    Predicted Duration of Therapy Intervention (days/wks) 4-6 weeks  -       User Key  (r) = Recorded By, (t) = Taken By, (c) = Cosigned By    Initials Name Provider Type    CLEMENTE Osman PTA Physical Therapy Assistant                Modalities     Row Name 04/04/18 0160             Moist Heat    MH Applied Yes  -      Location low back in prone+  -      Rx Minutes --   20 minutes with [facility]  -         Ultrasound 33822    Location low back paraspinals  -      Rx Minutes 8  -KH      Duty Cycle 100  -KH      Frequency 1.0 MHz  -      Intensity - Wts/cm 1.5  -KH         ELECTRICAL STIMULATION    Attended/Unattended Unattended  -      Stimulation Type IFC  -      Location/Electrode Placement/Other low back  -KH      Rx Minutes 20 mins  -        User Key  (r) = Recorded By, (t) = Taken By, (c) = Cosigned By    Initials Name Provider Type    CLEMENTE Osman PTA Physical Therapy Assistant                Exercises     Row Name 04/04/18 1424             Subjective Comments    Subjective Comments Pt reports that today is not a good day.  states that her doctor has still not signed off on her chair for work and so sitting at work causes her to have increased pain that is radiating down her leg today.  states that she stood for a while at work which helped relieve it a little but not much.   -CLEMENTE         Subjective Pain    Able to rate subjective pain? yes  -CLEMENTE       "Pre-Treatment Pain Level 10  -KH      Post-Treatment Pain Level 4  -KH         Aquatics    Aquatics performed? No  -KH         Exercise 1    Exercise Name 1 pro ll LE strength  -KH      Time 1 10' level 2  -KH         Exercise 2    Exercise Name 2 incline stretch  -KH      Sets 2 3  -KH      Time 2 30\"   -KH         Exercise 3    Exercise Name 3 standing hamstring stretch  -KH      Sets 3 3  -KH      Time 3 30\"  -KH         Exercise 4    Exercise Name 4 LTR  -KH      Sets 4 1  -KH      Reps 4 10  -KH         Exercise 5    Exercise Name 5 DKTC w/ feet on ball  -KH      Sets 5 10\"  -KH      Time 5 20 sec holds  -KH         Exercise 6    Exercise Name 6 US to LB   -KH      Additional Comments see modalities  -KH        User Key  (r) = Recorded By, (t) = Taken By, (c) = Cosigned By    Initials Name Provider Type    CLEMENTE Osman, PTA Physical Therapy Assistant                               PT OP Goals     Row Name 04/04/18 1556          PT Short Term Goals    STG Date to Achieve 04/02/18  -     STG 1 Patient will report a 50% subjective improvement  -     STG 1 Progress Progressing  -     STG 2 Patient will report pain </= 5/10 at all times  -     STG 2 Progress Not Met  -     STG 3 Patient will improve lumbar AROM to 75% of full range or greater  -     STG 3 Progress Progressing  -        Long Term Goals    LTG Date to Achieve 04/23/18  -KH     LTG 1 Patient will be indepedent with HEP  -KH     LTG 1 Progress Ongoing  -     LTG 2 Patient will improve R hip flexion and ABD MMT from to 4+/5  -KH     LTG 2 Progress Progressing  -KH     LTG 3 Patient will have lumbar AROM all planes WFL and pain </= 1/10  -KH     LTG 3 Progress Progressing  -     LTG 4 Patient will improve modified Oswestry from 27/50 to </= 15/50  -     LTG 4 Progress Not Met  -     LTG 5 Patient will improve B HS flexibility to WFL  -     LTG 5 Progress Progressing  -     LTG 6 Patient will be able to ambulate > 30 min with pain " </= 2/10  -CLEMENTE     LTG 6 Progress Progressing  -CLEMENTE        Time Calculation    PT Goal Re-Cert Due Date 04/02/18  -CLEMENTE       User Key  (r) = Recorded By, (t) = Taken By, (c) = Cosigned By    Initials Name Provider Type    CLEMENTE Osman PTA Physical Therapy Assistant                         Time Calculation:   Start Time: 1556  Stop Time: 1656  Time Calculation (min): 60 min  Total Timed Code Minutes- PT: 40 minute(s)    Therapy Charges for Today     Code Description Service Date Service Provider Modifiers Qty    47786979016 HC PT THER SUPP EA 15 MIN 4/4/2018 Michelle Osman PTA GP 1    06715736615 HC PT ELECTRICAL STIM UNATTENDED 4/4/2018 Michelle Osman PTA  1    91980802541 HC PT THER PROC EA 15 MIN 4/4/2018 Michelle Osman PTA GP 3                    Michelle Osman PTA  4/4/2018

## 2018-04-05 ENCOUNTER — HOSPITAL ENCOUNTER (OUTPATIENT)
Dept: PHYSICAL THERAPY | Facility: HOSPITAL | Age: 58
Setting detail: THERAPIES SERIES
Discharge: HOME | End: 2018-04-05

## 2018-04-05 DIAGNOSIS — M54.17 LUMBOSACRAL RADICULOPATHY: ICD-10-CM

## 2018-04-05 DIAGNOSIS — M54.41 CHRONIC LOW BACK PAIN WITH RIGHT-SIDED SCIATICA, UNSPECIFIED BACK PAIN LATERALITY: ICD-10-CM

## 2018-04-05 DIAGNOSIS — M54.17 LUMBOSACRAL NEURITIS: Primary | ICD-10-CM

## 2018-04-05 DIAGNOSIS — G89.29 CHRONIC LOW BACK PAIN WITH RIGHT-SIDED SCIATICA, UNSPECIFIED BACK PAIN LATERALITY: ICD-10-CM

## 2018-04-05 PROCEDURE — 97110 THERAPEUTIC EXERCISES: CPT

## 2018-04-05 NOTE — THERAPY TREATMENT NOTE
"    Outpatient Physical Therapy Ortho Treatment Note  [facility]     Patient Name: Brittni Hagen  : 1960  MRN: [id number]  Today's Date: 2018      Visit Date: 2018  Subjective Improvement: 50%   Visit Number:     Recert Date:   18  MD Visit:   PRN   Total Approved Visits:  1) 77 year  2) med nec    PT Diagnosis:   S/P Lumbar Laminectomy 17  Visit Dx:    ICD-10-CM ICD-9-CM   1. Lumbosacral neuritis M54.17 724.4   2. Lumbosacral radiculopathy M54.17 724.4   3. Chronic low back pain with right-sided sciatica, unspecified back pain laterality M54.41 724.2    G89.29 724.3     338.29       Patient Active Problem List   Diagnosis   • Type 2 diabetes mellitus   • Hyperlipidemia   • Essential hypertension   • Gastroesophageal reflux disease without esophagitis        Past Medical History:   Diagnosis Date   • Alpha galactosidase deficiency     red meat allergy; \"alpha gal syndrome\"   • Anemia    • Arthritis    • Asthma    • Diabetes mellitus    • Endometriosis    • Fibromyalgia    • Goiter    • Hyperlipidemia    • Hypertension     takes bp meds to help kidneys   • Neuropathy    • Sleep apnea    • Tinnitus    • Vitamin D deficiency         Past Surgical History:   Procedure Laterality Date   • CARPAL TUNNEL RELEASE Bilateral    • LAMINECTOMY  2017   • LAPAROSCOPIC TUBAL LIGATION     • TUBAL ABDOMINAL LIGATION               PT Ortho     Row Name 18 1600       Precautions and Contraindications    Precautions/Limitations lifting restrictions (specify in comments)   no greater thsn 50 lbs  -BB    Precautions seizure precautions  -BB    Contraindications S/P lumbar laminectomy on 2017  -BB       Posture/Observations    Posture/Observations Comments No acute distress. Good demo of gait in pool without use of UE for support.  Used HR going up and down steps to pool.  -BB    Row Name 18 1556       Precautions and Contraindications    Precautions/Limitations " lifting restrictions (specify in comments)   No greater than 50#  -    Precautions seizure precautions  -    Contraindications S/P lumbar laminectomy on 12-  -       Posture/Observations    Posture/Observations Comments No Back Brace; lumbar paraspinal tightness noted R greater than left this date; slight antalgia with gait.   -      User Key  (r) = Recorded By, (t) = Taken By, (c) = Cosigned By    Initials Name Provider Type    OSMEL Ponce, BUZZ Physical Therapy Assistant    CLEMENTE Osman PTA Physical Therapy Assistant                            PT Assessment/Plan     Row Name 04/05/18 1620          PT Assessment    Assessment Comments Good tolerance and demo of aquatics this date. Reports compliance with current HEP.  -BB        PT Plan    PT Frequency 2x/week  -BB     PT Plan Comments Recheck next week.  -BB        Clinical Impression    Predicted Duration of Therapy Intervention (days/wks) x 4-6 weeks  -BB       User Key  (r) = Recorded By, (t) = Taken By, (c) = Cosigned By    Initials Name Provider Type    OSMEL Ponce, BUZZ Physical Therapy Assistant                    Exercises     Row Name 04/05/18 1500             Subjective Comments    Subjective Comments Stayes she does not have cramp in her leg all the time now.  -BB         Subjective Pain    Able to rate subjective pain? yes  -BB      Pre-Treatment Pain Level 6  -BB      Post-Treatment Pain Level 4  -BB      Subjective Pain Comment decreased pain after  -BB         Aquatics    Aquatics performed? Yes  -BB         Exercise 1    Exercise Name 1 AQU 3 WAY WALK  -BB      Time 1 5 min ra  -BB         Exercise 2    Exercise Name 2 AQU CR/TR  -BB      Reps 2 20  -BB         Exercise 3    Exercise Name 3 AQU MS  -BB      Reps 3 20  -BB         Exercise 4    Exercise Name 4 AQU SLR 3 WAY BILAT  -BB      Reps 4 20  -BB         Exercise 5    Exercise Name 5 AQU SHLD 3 WAY NO PADDLES  -BB      Reps 5 15 EA  -BB         Exercise 6     Exercise Name 6 AQU PUSH PULL/PUSH MC  -BB      Reps 6 20  -BB         Exercise 7    Exercise Name 7 AQU DEEP BIKE  -BB      Time 7 5 min  -BB         Exercise 8    Exercise Name 8 AQU DEEP HANG  -BB      Time 8 10 min  -BB        User Key  (r) = Recorded By, (t) = Taken By, (c) = Cosigned By    Initials Name Provider Type    OSMEL Ponce PTA Physical Therapy Assistant                               PT OP Goals     Row Name 04/05/18 1500          PT Short Term Goals    STG Date to Achieve 04/02/18  -BB     STG 1 Patient will report a 50% subjective improvement  -BB     STG 1 Progress Met  -BB     STG 2 Patient will report pain </= 5/10 at all times  -BB     STG 2 Progress Not Met  -BB     STG 3 Patient will improve lumbar AROM to 75% of full range or greater  -BB     STG 3 Progress Progressing  -BB        Long Term Goals    LTG Date to Achieve 04/23/18  -BB     LTG 1 Patient will be indepedent with HEP  -BB     LTG 1 Progress Partially Met  -BB     LTG 2 Patient will improve R hip flexion and ABD MMT from to 4+/5  -BB     LTG 2 Progress Progressing  -BB     LTG 3 Patient will have lumbar AROM all planes WFL and pain </= 1/10  -BB     LTG 3 Progress Progressing  -BB     LTG 4 Patient will improve modified Oswestry from 27/50 to </= 15/50  -BB     LTG 4 Progress Not Met  -BB     LTG 5 Patient will improve B HS flexibility to WFL  -BB     LTG 5 Progress Progressing  -BB     LTG 6 Patient will be able to ambulate > 30 min with pain </= 2/10  -BB     LTG 6 Progress Progressing  -BB        Time Calculation    PT Goal Re-Cert Due Date 04/02/18  -BB       User Key  (r) = Recorded By, (t) = Taken By, (c) = Cosigned By    Initials Name Provider Type    OSMEL Ponce PTA Physical Therapy Assistant          Therapy Education  Given: HEP  Program: Reinforced  How Provided: Verbal  Provided to: Patient  Level of Understanding: Verbalized              Time Calculation:   Start Time: 1558  Stop Time: 1652  Time  Calculation (min): 54 min  Total Timed Code Minutes- PT: 54 minute(s)    Therapy Charges for Today     Code Description Service Date Service Provider Modifiers Qty    62584331308 HC PT THER PROC EA 15 MIN 4/5/2018 Torri Ponce, PTA GP 4                    Torri Ponce PTA  4/5/2018

## 2018-04-10 ENCOUNTER — HOSPITAL ENCOUNTER (OUTPATIENT)
Dept: PHYSICAL THERAPY | Facility: HOSPITAL | Age: 58
Setting detail: THERAPIES SERIES
Discharge: HOME | End: 2018-04-10

## 2018-04-10 DIAGNOSIS — M54.17 LUMBOSACRAL NEURITIS: Primary | ICD-10-CM

## 2018-04-10 DIAGNOSIS — M54.41 CHRONIC LOW BACK PAIN WITH RIGHT-SIDED SCIATICA, UNSPECIFIED BACK PAIN LATERALITY: ICD-10-CM

## 2018-04-10 DIAGNOSIS — G89.29 CHRONIC LOW BACK PAIN WITH RIGHT-SIDED SCIATICA, UNSPECIFIED BACK PAIN LATERALITY: ICD-10-CM

## 2018-04-10 DIAGNOSIS — M54.17 LUMBOSACRAL RADICULOPATHY: ICD-10-CM

## 2018-04-10 PROCEDURE — 97110 THERAPEUTIC EXERCISES: CPT

## 2018-04-11 ENCOUNTER — HOSPITAL ENCOUNTER (OUTPATIENT)
Dept: PHYSICAL THERAPY | Facility: HOSPITAL | Age: 58
Setting detail: THERAPIES SERIES
Discharge: HOME | End: 2018-04-11

## 2018-04-11 DIAGNOSIS — M54.17 LUMBOSACRAL NEURITIS: Primary | ICD-10-CM

## 2018-04-11 DIAGNOSIS — M54.17 LUMBOSACRAL RADICULOPATHY: ICD-10-CM

## 2018-04-11 DIAGNOSIS — M54.41 CHRONIC LOW BACK PAIN WITH RIGHT-SIDED SCIATICA, UNSPECIFIED BACK PAIN LATERALITY: ICD-10-CM

## 2018-04-11 DIAGNOSIS — G89.29 CHRONIC LOW BACK PAIN WITH RIGHT-SIDED SCIATICA, UNSPECIFIED BACK PAIN LATERALITY: ICD-10-CM

## 2018-04-11 PROCEDURE — 97110 THERAPEUTIC EXERCISES: CPT | Performed by: PHYSICAL THERAPIST

## 2018-04-11 PROCEDURE — 97035 APP MDLTY 1+ULTRASOUND EA 15: CPT | Performed by: PHYSICAL THERAPIST

## 2018-04-11 NOTE — THERAPY PROGRESS REPORT/RE-CERT
"    Outpatient Physical Therapy Ortho Progress Note  [facility]     Patient Name: Brittni Hagen  : 1960  MRN: [id number]  Today's Date: 2018      Visit Date: 2018      Attendance      Authorized 67   Pre Rx pain 6   Post Rx pain 5   % improvement 50%   MD follow up 2.5 mos   Recert date            Visit Dx:    ICD-10-CM ICD-9-CM   1. Lumbosacral neuritis M54.17 724.4   2. Lumbosacral radiculopathy M54.17 724.4   3. Chronic low back pain with right-sided sciatica, unspecified back pain laterality M54.41 724.2    G89.29 724.3     338.29       Patient Active Problem List   Diagnosis   • Type 2 diabetes mellitus   • Hyperlipidemia   • Essential hypertension   • Gastroesophageal reflux disease without esophagitis        Past Medical History:   Diagnosis Date   • Alpha galactosidase deficiency     red meat allergy; \"alpha gal syndrome\"   • Anemia    • Arthritis    • Asthma    • Diabetes mellitus    • Endometriosis    • Fibromyalgia    • Goiter    • Hyperlipidemia    • Hypertension     takes bp meds to help kidneys   • Neuropathy    • Sleep apnea    • Tinnitus    • Vitamin D deficiency         Past Surgical History:   Procedure Laterality Date   • CARPAL TUNNEL RELEASE Bilateral    • LAMINECTOMY  2017   • LAPAROSCOPIC TUBAL LIGATION     • TUBAL ABDOMINAL LIGATION               PT Ortho     Row Name 18 1601       Lumbar ROM Screen- Lower Quarter Clearing    Lumbar Flexion --   15\" from floor  -DD    Lumbar Extension --   10 deg  -DD    Lumbar Lateral Flexion --   4\" from right, 6\" from left LJL  -DD       Lumbosacral Palpation    Piriformis Right:;Tender  -DD    Quadratus Lumborum Right:;Tender  -DD    Hamstring Bilateral:;Guarded/taut  -DD      User Key  (r) = Recorded By, (t) = Taken By, (c) = Cosigned By    Initials Name Provider Type    BEATRICE Rubi PT Physical Therapist                            PT Assessment/Plan     Row Name 18 1721          " PT Assessment    Functional Limitations Limitations in functional capacity and performance;Limitations in community activities;Performance in self-care ADL  -DD     Impairments Gait;Pain;Posture;Muscle strength;Poor body mechanics  -DD     Assessment Comments Pain was increased today and she was slow and guarded with transitions.  Reports compliance to hop HEP.  Strength not improving very fast.  She has attended 17 visits of physical therapy to this point.  Minimal goal attainment.  And no progression on modified Oswestry functional scale.  -DD     Please refer to paper survey for additional self-reported information Yes  -DD     Rehab Potential Good  -DD     Patient/caregiver participated in establishment of treatment plan and goals Yes  -DD     Patient would benefit from skilled therapy intervention Yes  -DD        PT Plan    PT Frequency 2x/week  -DD     Predicted Duration of Therapy Intervention (OT Eval) 3 weeks  -DD     Planned CPT's? PT EVAL AQUA: 36821;PT MANUAL THERAPY EA 15 MIN: 56242;PT ULTRASOUND EA 15 MIN: 16784  -DD     Physical Therapy Interventions (Optional Details) modalities;home exercise program;lumbar stabilization;swiss ball techniques;stretching;strengthening;ROM (Range of Motion)  -DD     PT Plan Comments work on flexibility and core/ hip strengthening.  US prior to land exercises. Ice post exercise PRN.  -DD       User Key  (r) = Recorded By, (t) = Taken By, (c) = Cosigned By    Initials Name Provider Type    BEATRICE Rubi, PT Physical Therapist                Modalities     Row Name 04/11/18 1601             Ultrasound 31852    Location low back paraspinals  -DD      Rx Minutes 8  -DD      Duty Cycle 100  -DD      Frequency 1.0 MHz  -DD      Intensity - Wts/cm 1.5  -DD        User Key  (r) = Recorded By, (t) = Taken By, (c) = Cosigned By    Initials Name Provider Type    BEATRICE Rubi, PT Physical Therapist                Exercises     Row Name 04/11/18 1601           "   Subjective Comments    Subjective Comments Bad day today. Pain is up  -DD         Subjective Pain    Able to rate subjective pain? yes  -DD      Pre-Treatment Pain Level 6  -DD      Post-Treatment Pain Level 5  -DD         Aquatics    Aquatics performed? No  -DD         Exercise 1    Exercise Name 1 pro ll LE strength  -DD      Time 1 10' level 2  -DD         Exercise 2    Exercise Name 2 incline stretch  -DD      Sets 2 3  -DD      Time 2 30\"   -DD         Exercise 3    Exercise Name 3 standing hamstring stretch  -DD      Sets 3 3  -DD      Time 3 30\"  -DD         Exercise 4    Exercise Name 4 Prone TKE  -DD      Sets 4 2  -DD      Reps 4 10  -DD         Exercise 5    Exercise Name 5 DKTC w/ feet on ball  -DD      Sets 5 15  -DD      Time 5 --  -DD         Exercise 6    Exercise Name 6 US to LB   -DD      Additional Comments see modalities  -DD         Exercise 7    Time 7 3'  -DD      Time (Seconds) 7 Trunk ROm  -DD         Exercise 8    Time 8 3'  -DD      Time (Seconds) 8 isometriec LE resistance  -DD        User Key  (r) = Recorded By, (t) = Taken By, (c) = Cosigned By    Initials Name Provider Type    DD Sydni Rubi, PT Physical Therapist                               PT OP Goals     Row Name 04/11/18 1728 04/11/18 1601       PT Short Term Goals    STG Date to Achieve  -- 04/02/18  -DD    STG 1  -- Patient will report a 50% subjective improvement  -DD    STG 1 Progress  -- Met  -DD    STG 2  -- Patient will report pain </= 5/10 at all times  -DD    STG 2 Progress  -- Not Met  -DD    STG 2 Progress Comments  -- 6  -DD    STG 3  -- Patient will improve lumbar AROM to 75% of full range or greater  -DD    STG 3 Progress  -- Progressing  -DD       Long Term Goals    LTG Date to Achieve  -- 04/23/18  -DD    LTG 1  -- Patient will be indepedent with HEP  -DD    LTG 1 Progress  -- Partially Met  -DD    LTG 2  -- Patient will improve R hip flexion and ABD MMT from to 4+/5  -DD    LTG 2 Progress  -- " Progressing  -DD    LTG 3  -- Patient will have lumbar AROM all planes WFL and pain </= 1/10  -DD    LTG 3 Progress  -- Progressing  -DD    LTG 4  -- Patient will improve modified Oswestry from 27/50 to </= 15/50  -DD    LTG 4 Progress  -- Not Met  -DD    LTG 5  -- Patient will improve B HS flexibility to WFL  -DD    LTG 5 Progress  -- Progressing  -DD    LTG 6  -- Patient will be able to ambulate > 30 min with pain </= 2/10  -DD    LTG 6 Progress  -- Progressing  -DD       Time Calculation    PT Goal Re-Cert Due Date 05/02/18  -DD  --      User Key  (r) = Recorded By, (t) = Taken By, (c) = Cosigned By    Initials Name Provider Type    DD Sydni Rubi, PT Physical Therapist               Outcome Measure Options: Modifed Owestrfausto  Modified Oswestry  Modified Oswestry Score/Comments: 56%      Time Calculation:   Start Time: 1601  Stop Time: 1654  Time Calculation (min): 53 min  Total Timed Code Minutes- PT: 42 minute(s)    Therapy Charges for Today     Code Description Service Date Service Provider Modifiers Qty    83020692141 HC PT ULTRASOUND EA 15 MIN 4/11/2018 Sydni Rubi, PT GP 1    54018681147 HC PT THER PROC EA 15 MIN 4/11/2018 Sydni Rubi, PT GP 3          PT G-Codes  Outcome Measure Options: Modifed Owestry         Sydni Rubi, PT  4/11/2018

## 2018-04-16 ENCOUNTER — HOSPITAL ENCOUNTER (OUTPATIENT)
Dept: PHYSICAL THERAPY | Facility: HOSPITAL | Age: 58
Setting detail: THERAPIES SERIES
Discharge: HOME | End: 2018-04-16

## 2018-04-16 DIAGNOSIS — M54.17 LUMBOSACRAL NEURITIS: Primary | ICD-10-CM

## 2018-04-16 DIAGNOSIS — M54.17 LUMBOSACRAL RADICULOPATHY: ICD-10-CM

## 2018-04-16 DIAGNOSIS — G89.29 CHRONIC LOW BACK PAIN WITH RIGHT-SIDED SCIATICA, UNSPECIFIED BACK PAIN LATERALITY: ICD-10-CM

## 2018-04-16 DIAGNOSIS — M54.41 CHRONIC LOW BACK PAIN WITH RIGHT-SIDED SCIATICA, UNSPECIFIED BACK PAIN LATERALITY: ICD-10-CM

## 2018-04-16 PROCEDURE — 97110 THERAPEUTIC EXERCISES: CPT

## 2018-04-16 NOTE — THERAPY TREATMENT NOTE
"    Outpatient Physical Therapy Ortho Treatment Note  [facility]     Patient Name: Brittni Hagen  : 1960  MRN: [id number]  Today's Date: 2018      Visit Date: 2018     Subjective Improvement 50%  Visits   Visits approved 67  RTMD 2.5 months  Recert 2018      Visit Dx:    ICD-10-CM ICD-9-CM   1. Lumbosacral neuritis M54.17 724.4   2. Lumbosacral radiculopathy M54.17 724.4   3. Chronic low back pain with right-sided sciatica, unspecified back pain laterality M54.41 724.2    G89.29 724.3     338.29       Patient Active Problem List   Diagnosis   • Type 2 diabetes mellitus   • Hyperlipidemia   • Essential hypertension   • Gastroesophageal reflux disease without esophagitis        Past Medical History:   Diagnosis Date   • Alpha galactosidase deficiency     red meat allergy; \"alpha gal syndrome\"   • Anemia    • Arthritis    • Asthma    • Diabetes mellitus    • Endometriosis    • Fibromyalgia    • Goiter    • Hyperlipidemia    • Hypertension     takes bp meds to help kidneys   • Neuropathy    • Sleep apnea    • Tinnitus    • Vitamin D deficiency         Past Surgical History:   Procedure Laterality Date   • CARPAL TUNNEL RELEASE Bilateral    • LAMINECTOMY  2017   • LAPAROSCOPIC TUBAL LIGATION     • TUBAL ABDOMINAL LIGATION               PT Ortho     Row Name 18 1600       Precautions and Contraindications    Precautions/Limitations lifting restrictions (specify in comments)   no greater than 50 lb  -CP    Precautions seizure precautions  -CP    Contraindications lumbar laminectomy on 2017  -CP       Posture/Observations    Posture/Observations Comments non antalgic giat but slow movements  -CP      User Key  (r) = Recorded By, (t) = Taken By, (c) = Cosigned By    Initials Name Provider Type    CP Rachel Zamora PTA Physical Therapy Assistant                            PT Assessment/Plan     Row Name 18 1805          PT Assessment    Assessment " Comments slow movement in and out of the pool today.    -CP        PT Plan    PT Frequency 2x/week  -CP     PT Plan Comments land next visit.  perform US prior to therapy.  -CP        Clinical Impression    Predicted Duration of Therapy Intervention (days/wks) 3 weeks  -CP       User Key  (r) = Recorded By, (t) = Taken By, (c) = Cosigned By    Initials Name Provider Type    CP Rachel Zamora PTA Physical Therapy Assistant                    Exercises     Row Name 04/16/18 1600             Subjective Comments    Subjective Comments Having another bad day today.  States that pain is 8/10  -CP         Subjective Pain    Able to rate subjective pain? yes  -CP      Pre-Treatment Pain Level 8  -CP      Post-Treatment Pain Level 4  -CP         Aquatics    Aquatics performed? Yes  -CP         Exercise 1    Exercise Name 1 aqu 3 way  -CP      Time 1 5' each  -CP         Exercise 2    Exercise Name 2 aqu cr/tr  -CP      Sets 2 20  -CP         Exercise 3    Exercise Name 3 aqu mini squats  -CP      Reps 3 20  -CP         Exercise 4    Exercise Name 4 aqu HS stretches  -CP      Sets 4 3  -CP      Reps 4 30  -CP      Time 4 bilatera  -CP         Exercise 5    Exercise Name 5 aqu hip 3 way  -CP      Sets 5 2  -CP      Reps 5 10  -CP         Exercise 6    Reps 6 20  -CP      Time (Seconds) 6 aqu trunk rotation with cane  -CP         Exercise 7    Sets 7 2  -CP      Reps 7 10  -CP      Time 7 cw/ccw  -CP      Time (Seconds) 7 aqu paddle wheel with cane  -CP         Exercise 8    Sets 8 2  -CP      Reps 8 10  -CP      Time (Seconds) 8 aqu shoulder 3 way  -CP         Exercise 9    Exercise Name 9 aqu float steps  -CP      Reps 9 20  -CP      Time 9 yellow ring float  -CP         Exercise 10    Exercise Name 10 aqu deep hand bike  -CP      Time 10 5  -CP         Exercise 11    Exercise Name 11 Aqu deep hang  -CP      Time 11 12  -CP        User Key  (r) = Recorded By, (t) = Taken By, (c) = Cosigned By    Initials Name Provider Type     CP Rachel Zamora PTA Physical Therapy Assistant                               PT OP Goals     Row Name 04/16/18 1700          PT Short Term Goals    STG Date to Achieve 04/02/18  -CP     STG 1 Patient will report a 50% subjective improvement  -CP     STG 1 Progress Met  -CP     STG 2 Patient will report pain </= 5/10 at all times  -CP     STG 2 Progress Not Met  -CP     STG 3 Patient will improve lumbar AROM to 75% of full range or greater  -CP     STG 3 Progress Progressing  -CP        Long Term Goals    LTG Date to Achieve 04/23/18  -CP     LTG 1 Patient will be indepedent with HEP  -CP     LTG 1 Progress Partially Met  -CP     LTG 2 Patient will improve R hip flexion and ABD MMT from to 4+/5  -CP     LTG 2 Progress Progressing  -CP     LTG 3 Patient will have lumbar AROM all planes WFL and pain </= 1/10  -CP     LTG 3 Progress Progressing  -CP     LTG 4 Patient will improve modified Oswestry from 27/50 to </= 15/50  -CP     LTG 4 Progress Not Met  -CP     LTG 5 Patient will improve B HS flexibility to WFL  -CP     LTG 5 Progress Progressing  -CP     LTG 6 Patient will be able to ambulate > 30 min with pain </= 2/10  -CP     LTG 6 Progress Progressing  -CP        Time Calculation    PT Goal Re-Cert Due Date 05/02/18  -CP       User Key  (r) = Recorded By, (t) = Taken By, (c) = Cosigned By    Initials Name Provider Type    CP Rachel Zamora PTA Physical Therapy Assistant                         Time Calculation:   Start Time: 1650  Stop Time: 1750  Time Calculation (min): 60 min  Total Timed Code Minutes- PT: 60 minute(s)    Therapy Charges for Today     Code Description Service Date Service Provider Modifiers Qty    78494593518 HC PT THER PROC EA 15 MIN 4/16/2018 Rachel Zamora PTA GP 4                    Rachel Zamora PTA  4/16/2018

## 2018-04-17 ENCOUNTER — TRANSCRIBE ORDERS (OUTPATIENT)
Dept: LAB | Facility: HOSPITAL | Age: 58
End: 2018-04-17

## 2018-04-17 DIAGNOSIS — G40.109 EPILEPSY, TEMPORAL LOBE (HCC): Primary | ICD-10-CM

## 2018-04-18 ENCOUNTER — HOSPITAL ENCOUNTER (OUTPATIENT)
Dept: PHYSICAL THERAPY | Facility: HOSPITAL | Age: 58
Setting detail: THERAPIES SERIES
Discharge: HOME | End: 2018-04-18

## 2018-04-18 DIAGNOSIS — M54.17 LUMBOSACRAL NEURITIS: ICD-10-CM

## 2018-04-18 DIAGNOSIS — G89.29 CHRONIC LOW BACK PAIN WITH RIGHT-SIDED SCIATICA, UNSPECIFIED BACK PAIN LATERALITY: ICD-10-CM

## 2018-04-18 DIAGNOSIS — M54.41 CHRONIC LOW BACK PAIN WITH RIGHT-SIDED SCIATICA, UNSPECIFIED BACK PAIN LATERALITY: ICD-10-CM

## 2018-04-18 DIAGNOSIS — M54.17 LUMBOSACRAL RADICULOPATHY: Primary | ICD-10-CM

## 2018-04-18 PROCEDURE — 97110 THERAPEUTIC EXERCISES: CPT

## 2018-04-18 NOTE — THERAPY TREATMENT NOTE
"    Outpatient Physical Therapy Ortho Treatment Note  [facility]     Patient Name: Brittni Hagen  : 1960  MRN: [id number]  Today's Date: 2018      Visit Date: 2018     Subjective Improvement 50%  Visits   Visits approved 60  RTMD 2.5 month  Recert 2018    S/P lumbar laminectomy 2018    Visit Dx:    ICD-10-CM ICD-9-CM   1. Lumbosacral radiculopathy M54.17 724.4   2. Lumbosacral neuritis M54.17 724.4   3. Chronic low back pain with right-sided sciatica, unspecified back pain laterality M54.41 724.2    G89.29 724.3     338.29       Patient Active Problem List   Diagnosis   • Type 2 diabetes mellitus   • Hyperlipidemia   • Essential hypertension   • Gastroesophageal reflux disease without esophagitis        Past Medical History:   Diagnosis Date   • Alpha galactosidase deficiency     red meat allergy; \"alpha gal syndrome\"   • Anemia    • Arthritis    • Asthma    • Diabetes mellitus    • Endometriosis    • Fibromyalgia    • Goiter    • Hyperlipidemia    • Hypertension     takes bp meds to help kidneys   • Neuropathy    • Sleep apnea    • Tinnitus    • Vitamin D deficiency         Past Surgical History:   Procedure Laterality Date   • CARPAL TUNNEL RELEASE Bilateral    • LAMINECTOMY  2017   • LAPAROSCOPIC TUBAL LIGATION     • TUBAL ABDOMINAL LIGATION               PT Ortho     Row Name 18 1600       Precautions and Contraindications    Precautions/Limitations lifting restrictions (specify in comments)   no greater than 50 lb  -CP    Precautions seizure precautions  -CP    Contraindications lumbar laminectomy on 2017  -CP       Posture/Observations    Posture/Observations Comments non antalgic giat but slow movements  -CP      User Key  (r) = Recorded By, (t) = Taken By, (c) = Cosigned By    Initials Name Provider Type    CP Rachel Zamora PTA Physical Therapy Assistant                                Modalities     Row Name 18 1700             " "Subjective Comments    Subjective Comments Patient states that she worked one hour over today and is very sotr  -CP         Subjective Pain    Able to rate subjective pain? yes  -CP      Pre-Treatment Pain Level 5  -CP         Ice    Ice Applied Yes  -CP      Location low back prone  -CP      Rx Minutes 10 mins  -CP      Ice S/P Rx Yes  -CP         Ultrasound 77461    Location low back paraspinals   -CP      Rx Minutes 8  -CP      Duty Cycle 100  -CP      Frequency 1.0 MHz  -CP      Intensity - Wts/cm 1.5  -CP        User Key  (r) = Recorded By, (t) = Taken By, (c) = Cosigned By    Initials Name Provider Type    CP Rachel Zamora PTA Physical Therapy Assistant                Exercises     Row Name 04/18/18 1700             Subjective Comments    Subjective Comments Patient states that she worked one hour over today and is very sotr  -CP         Subjective Pain    Able to rate subjective pain? yes  -CP      Pre-Treatment Pain Level 5  -CP         Aquatics    Aquatics performed? No  -CP         Exercise 1    Exercise Name 1 us see modalities  -CP         Exercise 2    Exercise Name 2 Pro II level 3  -CP      Time 2 10  -CP         Exercise 3    Exercise Name 3 incline stretch  -CP      Sets 3 3  -CP      Time 3 30  -CP         Exercise 4    Exercise Name 4 Standing HS stretch  -CP      Sets 4 3  -CP      Time 4 30  -CP      Additional Comments bilateral  -CP         Exercise 5    Exercise Name 5 step up 6\"  -CP      Sets 5 2  -CP      Reps 5 10  -CP      Time 5 bilateral  -CP         Exercise 6    Sets 6 2  -CP      Reps 6 10  -CP      Time (Seconds) 6 lat step up 4\"  -CP         Exercise 7    Exercise Name 7 core stab on small dynadisk  -CP      Sets 7 2  -CP      Reps 7 10  -CP      Time 7 marching  -CP         Exercise 8    Sets 8 2  -CP      Reps 8 10  -CP      Time 8 over head reach with ball  -CP      Time (Seconds) 8 core stab on small dynadisk  -CP         Exercise 9    Exercise Name 9 cybex hip AB  -CP   "    Sets 9 2  -CP      Reps 9 10  -CP      Time 9 20 lb  -CP         Exercise 10    Exercise Name 10 cybex leg press  -CP      Sets 10 2  -CP      Reps 10 10  -CP      Time 10 70 lb  -CP        User Key  (r) = Recorded By, (t) = Taken By, (c) = Cosigned By    Initials Name Provider Type    CP Rachel Zamora PTA Physical Therapy Assistant                               PT OP Goals     Row Name 04/18/18 1700          PT Short Term Goals    STG Date to Achieve 04/02/18  -CP     STG 1 Patient will report a 50% subjective improvement  -CP     STG 1 Progress Met  -CP     STG 2 Patient will report pain </= 5/10 at all times  -CP     STG 2 Progress Not Met  -CP     STG 3 Patient will improve lumbar AROM to 75% of full range or greater  -CP     STG 3 Progress Progressing  -CP        Long Term Goals    LTG Date to Achieve 04/23/18  -CP     LTG 1 Patient will be indepedent with HEP  -CP     LTG 1 Progress Partially Met  -CP     LTG 2 Patient will improve R hip flexion and ABD MMT from to 4+/5  -CP     LTG 2 Progress Progressing  -CP     LTG 3 Patient will have lumbar AROM all planes WFL and pain </= 1/10  -CP     LTG 3 Progress Progressing  -CP     LTG 4 Patient will improve modified Oswestry from 27/50 to </= 15/50  -CP     LTG 4 Progress Not Met  -CP     LTG 5 Patient will improve B HS flexibility to WFL  -CP     LTG 5 Progress Progressing  -CP     LTG 6 Patient will be able to ambulate > 30 min with pain </= 2/10  -CP     LTG 6 Progress Progressing  -CP        Time Calculation    PT Goal Re-Cert Due Date 05/02/18  -CP       User Key  (r) = Recorded By, (t) = Taken By, (c) = Cosigned By    Initials Name Provider Type    CP Rachel Zamora PTA Physical Therapy Assistant                         Time Calculation:   Start Time: 1650  Stop Time: 1755  Time Calculation (min): 65 min  Total Timed Code Minutes- PT: 55 minute(s)    Therapy Charges for Today     Code Description Service Date Service Provider Modifiers Qty     26546714346  PT THER PROC EA 15 MIN 4/18/2018 Rachel Zamora, PTA GP 4                    Rachel Zamora, BUZZ  4/18/2018

## 2018-04-19 ENCOUNTER — APPOINTMENT (OUTPATIENT)
Dept: LAB | Facility: HOSPITAL | Age: 58
End: 2018-04-19

## 2018-04-19 LAB
ALBUMIN SERPL-MCNC: 4.2 G/DL (ref 3.4–4.8)
ALBUMIN/GLOB SERPL: 1.3 G/DL (ref 1.1–1.8)
ALP SERPL-CCNC: 60 U/L (ref 38–126)
ALT SERPL W P-5'-P-CCNC: 24 U/L (ref 9–52)
ANION GAP SERPL CALCULATED.3IONS-SCNC: 13 MMOL/L (ref 5–15)
AST SERPL-CCNC: 34 U/L (ref 14–36)
BASOPHILS # BLD AUTO: 0.05 10*3/MM3 (ref 0–0.2)
BASOPHILS NFR BLD AUTO: 0.8 % (ref 0–2)
BILIRUB SERPL-MCNC: 0.5 MG/DL (ref 0.2–1.3)
BUN BLD-MCNC: 11 MG/DL (ref 7–21)
BUN/CREAT SERPL: 13.4 (ref 7–25)
CALCIUM SPEC-SCNC: 9.9 MG/DL (ref 8.4–10.2)
CHLORIDE SERPL-SCNC: 98 MMOL/L (ref 95–110)
CO2 SERPL-SCNC: 29 MMOL/L (ref 22–31)
CREAT BLD-MCNC: 0.82 MG/DL (ref 0.5–1)
DEPRECATED RDW RBC AUTO: 47.7 FL (ref 36.4–46.3)
EOSINOPHIL # BLD AUTO: 0.12 10*3/MM3 (ref 0–0.7)
EOSINOPHIL NFR BLD AUTO: 1.9 % (ref 0–7)
ERYTHROCYTE [DISTWIDTH] IN BLOOD BY AUTOMATED COUNT: 14.9 % (ref 11.5–14.5)
GFR SERPL CREATININE-BSD FRML MDRD: 72 ML/MIN/1.73 (ref 60–120)
GLOBULIN UR ELPH-MCNC: 3.2 GM/DL (ref 2.3–3.5)
GLUCOSE BLD-MCNC: 100 MG/DL (ref 60–100)
HCT VFR BLD AUTO: 40.5 % (ref 35–45)
HGB BLD-MCNC: 13.3 G/DL (ref 12–15.5)
IMM GRANULOCYTES # BLD: 0.01 10*3/MM3 (ref 0–0.02)
IMM GRANULOCYTES NFR BLD: 0.2 % (ref 0–0.5)
LYMPHOCYTES # BLD AUTO: 1.97 10*3/MM3 (ref 0.6–4.2)
LYMPHOCYTES NFR BLD AUTO: 30.6 % (ref 10–50)
MCH RBC QN AUTO: 28.6 PG (ref 26.5–34)
MCHC RBC AUTO-ENTMCNC: 32.8 G/DL (ref 31.4–36)
MCV RBC AUTO: 87.1 FL (ref 80–98)
MONOCYTES # BLD AUTO: 0.56 10*3/MM3 (ref 0–0.9)
MONOCYTES NFR BLD AUTO: 8.7 % (ref 0–12)
NEUTROPHILS # BLD AUTO: 3.73 10*3/MM3 (ref 2–8.6)
NEUTROPHILS NFR BLD AUTO: 57.8 % (ref 37–80)
PLATELET # BLD AUTO: 279 10*3/MM3 (ref 150–450)
PMV BLD AUTO: 11.2 FL (ref 8–12)
POTASSIUM BLD-SCNC: 3.6 MMOL/L (ref 3.5–5.1)
PROT SERPL-MCNC: 7.4 G/DL (ref 6.3–8.6)
RBC # BLD AUTO: 4.65 10*6/MM3 (ref 3.77–5.16)
SODIUM BLD-SCNC: 140 MMOL/L (ref 137–145)
WBC NRBC COR # BLD: 6.44 10*3/MM3 (ref 3.2–9.8)

## 2018-04-19 PROCEDURE — 80177 DRUG SCRN QUAN LEVETIRACETAM: CPT | Performed by: PSYCHIATRY & NEUROLOGY

## 2018-04-19 PROCEDURE — 80053 COMPREHEN METABOLIC PANEL: CPT | Performed by: PSYCHIATRY & NEUROLOGY

## 2018-04-19 PROCEDURE — 36415 COLL VENOUS BLD VENIPUNCTURE: CPT | Performed by: PSYCHIATRY & NEUROLOGY

## 2018-04-19 PROCEDURE — 85025 COMPLETE CBC W/AUTO DIFF WBC: CPT | Performed by: PSYCHIATRY & NEUROLOGY

## 2018-04-21 LAB — LEVETIRACETAM SERPL-MCNC: 18.6 UG/ML (ref 10–40)

## 2018-04-23 ENCOUNTER — HOSPITAL ENCOUNTER (OUTPATIENT)
Dept: PHYSICAL THERAPY | Facility: HOSPITAL | Age: 58
Setting detail: THERAPIES SERIES
Discharge: HOME | End: 2018-04-23

## 2018-04-23 DIAGNOSIS — G89.29 CHRONIC LOW BACK PAIN WITH RIGHT-SIDED SCIATICA, UNSPECIFIED BACK PAIN LATERALITY: ICD-10-CM

## 2018-04-23 DIAGNOSIS — M54.41 CHRONIC LOW BACK PAIN WITH RIGHT-SIDED SCIATICA, UNSPECIFIED BACK PAIN LATERALITY: ICD-10-CM

## 2018-04-23 DIAGNOSIS — M54.17 LUMBOSACRAL RADICULOPATHY: Primary | ICD-10-CM

## 2018-04-23 DIAGNOSIS — M54.17 LUMBOSACRAL NEURITIS: ICD-10-CM

## 2018-04-23 PROCEDURE — 97110 THERAPEUTIC EXERCISES: CPT

## 2018-04-23 NOTE — THERAPY TREATMENT NOTE
"    Outpatient Physical Therapy Ortho Treatment Note  [facility]     Patient Name: Brittni Hagen  : 1960  MRN: [id number]  Today's Date: 2018      Visit Date: 2018     Subjective Improvement 60%  Visits   Visits approved 75 per year  RTMD 2.5 months  Recert Date 2018    S/P lumbar laminectomy on 2017    Visit Dx:    ICD-10-CM ICD-9-CM   1. Lumbosacral radiculopathy M54.17 724.4   2. Lumbosacral neuritis M54.17 724.4   3. Chronic low back pain with right-sided sciatica, unspecified back pain laterality M54.41 724.2    G89.29 724.3     338.29       Patient Active Problem List   Diagnosis   • Type 2 diabetes mellitus   • Hyperlipidemia   • Essential hypertension   • Gastroesophageal reflux disease without esophagitis        Past Medical History:   Diagnosis Date   • Alpha galactosidase deficiency     red meat allergy; \"alpha gal syndrome\"   • Anemia    • Arthritis    • Asthma    • Diabetes mellitus    • Endometriosis    • Fibromyalgia    • Goiter    • Hyperlipidemia    • Hypertension     takes bp meds to help kidneys   • Neuropathy    • Sleep apnea    • Tinnitus    • Vitamin D deficiency         Past Surgical History:   Procedure Laterality Date   • CARPAL TUNNEL RELEASE Bilateral    • LAMINECTOMY  2017   • LAPAROSCOPIC TUBAL LIGATION     • TUBAL ABDOMINAL LIGATION                               PT Assessment/Plan     Row Name 18 8218          PT Assessment    Assessment Comments no c/o pain while performing aquatic ex.  -CP        PT Plan    PT Frequency 2x/week  -CP     PT Plan Comments Land next visit.  Start with US before ther ex per POC  -CP        Clinical Impression    Predicted Duration of Therapy Intervention (days/wks) 3 weeks  -CP       User Key  (r) = Recorded By, (t) = Taken By, (c) = Cosigned By    Initials Name Provider Type    CP Rachel Zamora PTA Physical Therapy Assistant                    Exercises     Row Name 18 1600       "       Subjective Comments    Subjective Comments Patient states that she is about 60% better.    -CP         Subjective Pain    Able to rate subjective pain? yes  -CP      Pre-Treatment Pain Level 5  -CP      Post-Treatment Pain Level 2  -CP         Aquatics    Aquatics performed? Yes  -CP         Exercise 1    Exercise Name 1 aqu walk 3 way  -CP      Time 1 5' each  -CP         Exercise 2    Exercise Name 2 AQU CR/Tr  -CP      Sets 2 2  -CP      Reps 2 10  -CP         Exercise 3    Exercise Name 3 Aqu mini squats  -CP      Reps 3 20  -CP         Exercise 4    Exercise Name 4 Os[place]do hip fl and ext  -CP      Reps 4 20  -CP      Time 4 green ring float  -CP         Exercise 5    Exercise Name 5 aqu standing hs stretch  -CP      Sets 5 3  -CP      Time 5 30  -CP         Exercise 6    Reps 6 20  -CP      Time (Seconds) 6 aqu trunk rotation with cane  -CP         Exercise 7    Reps 7 20  -CP      Time 7 ABC board  -CP      Time (Seconds) 7 aqu push pull   -CP         Exercise 8    Reps 8 30  -CP      Time 8 paddle  -CP      Time (Seconds) 8 aqu shoulder 3 way  -CP         Exercise 9    Exercise Name 9 aqu deep hang  -CP      Time 9 12  -CP        User Key  (r) = Recorded By, (t) = Taken By, (c) = Cosigned By    Initials Name Provider Type    CP Rachel Zamora PTA Physical Therapy Assistant                               PT OP Goals     Row Name 04/23/18 1700          PT Short Term Goals    STG Date to Achieve 04/02/18  -CP     STG 1 Patient will report a 50% subjective improvement  -CP     STG 1 Progress Met  -CP     STG 2 Patient will report pain </= 5/10 at all times  -CP     STG 2 Progress Partially Met  -CP     STG 3 Patient will improve lumbar AROM to 75% of full range or greater  -CP     STG 3 Progress Progressing  -CP        Long Term Goals    LTG Date to Achieve 04/23/18  -CP     LTG 1 Patient will be indepedent with HEP  -CP     LTG 1 Progress Partially Met  -CP     LTG 2 Patient will improve R hip flexion and  ABD MMT from to 4+/5  -CP     LTG 2 Progress Progressing  -CP     LTG 3 Patient will have lumbar AROM all planes WFL and pain </= 1/10  -CP     LTG 3 Progress Progressing  -CP     LTG 4 Patient will improve modified Oswestry from 27/50 to </= 15/50  -CP     LTG 4 Progress Not Met  -CP     LTG 5 Patient will improve B HS flexibility to WFL  -CP     LTG 5 Progress Progressing  -CP     LTG 6 Patient will be able to ambulate > 30 min with pain </= 2/10  -CP     LTG 6 Progress Progressing  -CP        Time Calculation    PT Goal Re-Cert Due Date 05/02/18  -CP       User Key  (r) = Recorded By, (t) = Taken By, (c) = Cosigned By    Initials Name Provider Type    CP Rachel Zamora PTA Physical Therapy Assistant                         Time Calculation:   Start Time: 1650  Stop Time: 1745  Time Calculation (min): 55 min  Total Timed Code Minutes- PT: 55 minute(s)    Therapy Charges for Today     Code Description Service Date Service Provider Modifiers Qty    52068990141 HC PT THER PROC EA 15 MIN 4/23/2018 Rachel Zamora PTA GP 4                    Rachel Zamora PTA  4/23/2018

## 2018-04-25 ENCOUNTER — APPOINTMENT (OUTPATIENT)
Dept: PHYSICAL THERAPY | Facility: HOSPITAL | Age: 58
End: 2018-04-25

## 2018-04-30 ENCOUNTER — HOSPITAL ENCOUNTER (OUTPATIENT)
Dept: PHYSICAL THERAPY | Facility: HOSPITAL | Age: 58
Setting detail: THERAPIES SERIES
Discharge: HOME | End: 2018-04-30

## 2018-04-30 DIAGNOSIS — M54.41 CHRONIC LOW BACK PAIN WITH RIGHT-SIDED SCIATICA, UNSPECIFIED BACK PAIN LATERALITY: ICD-10-CM

## 2018-04-30 DIAGNOSIS — G89.29 CHRONIC LOW BACK PAIN WITH RIGHT-SIDED SCIATICA, UNSPECIFIED BACK PAIN LATERALITY: ICD-10-CM

## 2018-04-30 DIAGNOSIS — M54.17 LUMBOSACRAL RADICULOPATHY: Primary | ICD-10-CM

## 2018-04-30 DIAGNOSIS — M54.17 LUMBOSACRAL NEURITIS: ICD-10-CM

## 2018-04-30 PROCEDURE — 97110 THERAPEUTIC EXERCISES: CPT

## 2018-05-02 ENCOUNTER — APPOINTMENT (OUTPATIENT)
Dept: PHYSICAL THERAPY | Facility: HOSPITAL | Age: 58
End: 2018-05-02

## 2018-05-03 ENCOUNTER — HOSPITAL ENCOUNTER (OUTPATIENT)
Dept: PHYSICAL THERAPY | Facility: HOSPITAL | Age: 58
Setting detail: THERAPIES SERIES
Discharge: HOME | End: 2018-05-03

## 2018-05-03 DIAGNOSIS — M54.41 CHRONIC LOW BACK PAIN WITH RIGHT-SIDED SCIATICA, UNSPECIFIED BACK PAIN LATERALITY: ICD-10-CM

## 2018-05-03 DIAGNOSIS — G89.29 CHRONIC LOW BACK PAIN WITH RIGHT-SIDED SCIATICA, UNSPECIFIED BACK PAIN LATERALITY: ICD-10-CM

## 2018-05-03 DIAGNOSIS — M54.17 LUMBOSACRAL RADICULOPATHY: ICD-10-CM

## 2018-05-03 DIAGNOSIS — M54.17 LUMBOSACRAL NEURITIS: Primary | ICD-10-CM

## 2018-05-03 PROCEDURE — 97110 THERAPEUTIC EXERCISES: CPT

## 2018-05-03 PROCEDURE — G0283 ELEC STIM OTHER THAN WOUND: HCPCS

## 2018-05-03 PROCEDURE — 97140 MANUAL THERAPY 1/> REGIONS: CPT

## 2018-05-03 NOTE — THERAPY PROGRESS REPORT/RE-CERT
"    Outpatient Physical Therapy Ortho Progress Note  [facility]     Patient Name: Brittni Hagen  : 1960  MRN: [id number]  Today's Date: 5/3/2018      Visit Date: 2018   Subjective Improvement 65%  Visits   Visits approved 75 per year  Recert Date 2018    Patient Active Problem List   Diagnosis   • Type 2 diabetes mellitus   • Hyperlipidemia   • Essential hypertension   • Gastroesophageal reflux disease without esophagitis        Past Medical History:   Diagnosis Date   • Alpha galactosidase deficiency     red meat allergy; \"alpha gal syndrome\"   • Anemia    • Arthritis    • Asthma    • Diabetes mellitus    • Endometriosis    • Fibromyalgia    • Goiter    • Hyperlipidemia    • Hypertension     takes bp meds to help kidneys   • Neuropathy    • Sleep apnea    • Tinnitus    • Vitamin D deficiency         Past Surgical History:   Procedure Laterality Date   • CARPAL TUNNEL RELEASE Bilateral    • LAMINECTOMY  2017   • LAPAROSCOPIC TUBAL LIGATION     • TUBAL ABDOMINAL LIGATION         Visit Dx:     ICD-10-CM ICD-9-CM   1. Lumbosacral neuritis M54.17 724.4   2. Lumbosacral radiculopathy M54.17 724.4   3. Chronic low back pain with right-sided sciatica, unspecified back pain laterality M54.41 724.2    G89.29 724.3     338.29                 PT Ortho     Row Name 18 1600       Subjective Pain    Able to rate subjective pain? yes  -MW    Pre-Treatment Pain Level 6  -MW    Post-Treatment Pain Level 6  -MW       Lumbar ROM Screen- Lower Quarter Clearing    Lumbar Flexion --   touch fingertips to 2 inches above ankle  -MW    Lumbar Extension --   remains in neutral  -MW    Lumbar Lateral Flexion --   touch fingertips to 6 inches above lat knee joint line   -MW    Lumbar Rotation --   50% bilat  -MW       Lumbosacral Palpation    SI Tender  -MW    Spinous Process Tender  -MW    Piriformis Right:;Tender  -MW    Erector Spinae (Paraspinals) Right:;Tender;Guarded/taut   multiple " triggerpoints  -MW       General Assessment (Manual Muscle Testing)    Comment, General Manual Muscle Testing (MMT) Assessment MMT grossly 4/5 for all bilat LE MMT of all planes  -MW      User Key  (r) = Recorded By, (t) = Taken By, (c) = Cosigned By    Initials Name Provider Type    MW Lori Regan, PT Physical Therapist                                  PT OP Goals     Row Name 05/03/18 1600          PT Short Term Goals    STG Date to Achieve 05/16/18  -MW     STG 1 Patient will report a 50% subjective improvement  -MW     STG 1 Progress Met  -MW     STG 2 Patient will report pain </= 5/10 at all times  -MW     STG 2 Progress Progressing  -MW     STG 3 Patient will improve lumbar AROM to 75% of full range or greater  -MW     STG 3 Progress Progressing  -MW        Long Term Goals    LTG Date to Achieve 05/24/18  -MW     LTG 1 Patient will be indepedent with HEP  -MW     LTG 1 Progress Progressing  -MW     LTG 2 Patient will improve R hip flexion and ABD MMT from to 4+/5  -MW     LTG 2 Progress Progressing  -MW     LTG 3 Patient will have lumbar AROM all planes WFL and pain </= 1/10  -MW     LTG 3 Progress Progressing  -MW     LTG 4 Patient will improve modified Oswestry from 27/50 to </= 15/50  -MW     LTG 4 Progress Progressing  -MW     LTG 5 Patient will improve B HS flexibility to WFL  -MW     LTG 5 Progress Progressing  -MW     LTG 6 Patient will be able to ambulate > 30 min with pain </= 2/10  -MW     LTG 6 Progress Progressing  -MW        Time Calculation    PT Goal Re-Cert Due Date 05/24/18  -MW       User Key  (r) = Recorded By, (t) = Taken By, (c) = Cosigned By    Initials Name Provider Type    MW Lori Regan, PT Physical Therapist                PT Assessment/Plan     Row Name 05/03/18 1700 05/03/18 1600       PT Assessment    Functional Limitations  -- Limitation in home management;Limitations in community activities;Limitations in functional capacity and performance;Performance in self-care ADL   -MW    Impairments  -- Gait;Muscle strength;Pain;Poor body mechanics;Posture;Range of motion  -MW    Assessment Comments    -MW Pt tolerated tx and reassess well. Goals assessed today and pt has made progress towards her remaining goals in recent weeks. Pt continues to have slow progress w/ rehab. Despite her slow progress, the pt has made significant pain improvements. She continues to lack functional ROM and WNL LE strength. The pt is sleeping better through the night now, and her ability to complete functional tasks has improved. Based on pt's continued functional deficits it is my impression she would continue to benefit from therapy services. I anticipate she will do well w/ PT based on her objective measures this session and her motivation to improve. Will continue w/ aquatic therapy as part of POC secondary to pt feeling improvement w/ mobility w/ this intervention.   -MW    Please refer to paper survey for additional self-reported information  -- Yes  -MW    Rehab Potential  -- Good  -MW    Patient/caregiver participated in establishment of treatment plan and goals  -- Yes  -MW    Patient would benefit from skilled therapy intervention  -- Yes  -MW       PT Plan    PT Frequency  -- 2x/week  -MW    Predicted Duration of Therapy Intervention (OT Eval)  -- 3 weeks  -MW    PT Plan Comments  -- Continue to progress per POC.  -MW       Clinical Impression    Predicted Duration of Therapy Intervention (days/wks)  -- 3 weeks  -MW      User Key  (r) = Recorded By, (t) = Taken By, (c) = Cosigned By    Initials Name Provider Type    CASANDRA Regan, PT Physical Therapist                Modalities     Row Name 05/03/18 1600             Moist Heat    MH Applied Yes  -MW      Location low back in prone+  -MW      Rx Minutes --   20 minutes with IF  -MW         ELECTRICAL STIMULATION    Attended/Unattended Unattended  -MW      Stimulation Type IFC  -MW      Location/Electrode Placement/Other low back  -MW      Rx  "Minutes 20 mins  -MW        User Key  (r) = Recorded By, (t) = Taken By, (c) = Cosigned By    Initials Name Provider Type    CASANDRA Regan PT Physical Therapist              Exercises     Row Name 05/03/18 1600             Subjective Comments    Subjective Comments Pt states she feels okay today. She reports she feels 65% better since eval. She states she continues to have pain affecting her daily life. She states she has extreme difficulty w/ walking and prolonged standing due to pain. Some activities she used to enjoy, like shopping, are now undesireable. She states she can tell that she is improving secondary to her pain being less at times (like when sleeping). She consents to today's session.  She states her numbness/tingling has improved; however, it still comes and goes down into the R foot. Pt states she had a f/u w/ her neurologist last week and he told her she is \"in the low % of people who don't get better immediately after this surgery\". Pt states she gets some pain relief w/ aquatic therapy; however, the relief lasts only 1 day.  -MW         Subjective Pain    Able to rate subjective pain? yes  -MW      Pre-Treatment Pain Level 6  -MW      Post-Treatment Pain Level 6  -MW         Aquatics    Aquatics performed? No  -MW         Exercise 1    Exercise Name 1 pro ll LE strength  -MW      Time 1 9' level 2  -MW         Exercise 2    Exercise Name 2 Pt education regarding pain science. Education provided regarding CNS involvement w/ pain regulation.  -MW      Time 2 3'  -MW         Exercise 3    Time 3 --  -MW      Time (Seconds) 3 --  -MW        User Key  (r) = Recorded By, (t) = Taken By, (c) = Cosigned By    Initials Name Provider Type    CASANDRA Rgean PT Physical Therapist           Manual Rx (last 36 hours)      Manual Treatments     Row Name 05/03/18 1700             Manual Rx 1    Manual Rx 1 Location R paraspinals  -MW      Manual Rx 1 Type MFR/TrPt release   -MW      Manual Rx 1 Duration " 10 min  -CASANDRA        User Key  (r) = Recorded By, (t) = Taken By, (c) = Cosigned By    Initials Name Provider Type    CASANDRA Regan PT Physical Therapist                      Outcome Measure Options: Modified Owestrfausto  Modified Oswestry  Modified Oswestry Score/Comments: 25/50      Time Calculation:   Start Time: 1600  Stop Time: 1644  Time Calculation (min): 44 min  PT Non-Billable Time (min): 2 min  Total Timed Code Minutes- PT: 42 minute(s)     Therapy Charges for Today     Code Description Service Date Service Provider Modifiers Qty    01028637120  PT MANUAL THERAPY EA 15 MIN 5/3/2018 Lori Regan, PT GP 1    86246457197 HC PT THER PROC EA 15 MIN 5/3/2018 Lori Regan, PT GP 1    16321843905  PT ELECTRICAL STIM UNATTENDED 5/3/2018 Lori Regan, PT  1                   Lori Regan, PT  5/3/2018

## 2018-05-08 ENCOUNTER — APPOINTMENT (OUTPATIENT)
Dept: PHYSICAL THERAPY | Facility: HOSPITAL | Age: 58
End: 2018-05-08

## 2018-05-09 ENCOUNTER — APPOINTMENT (OUTPATIENT)
Dept: PHYSICAL THERAPY | Facility: HOSPITAL | Age: 58
End: 2018-05-09

## 2018-05-10 ENCOUNTER — HOSPITAL ENCOUNTER (OUTPATIENT)
Dept: PHYSICAL THERAPY | Facility: HOSPITAL | Age: 58
Setting detail: THERAPIES SERIES
Discharge: HOME | End: 2018-05-10

## 2018-05-10 DIAGNOSIS — M54.41 CHRONIC LOW BACK PAIN WITH RIGHT-SIDED SCIATICA, UNSPECIFIED BACK PAIN LATERALITY: ICD-10-CM

## 2018-05-10 DIAGNOSIS — M54.17 LUMBOSACRAL NEURITIS: Primary | ICD-10-CM

## 2018-05-10 DIAGNOSIS — M54.17 LUMBOSACRAL RADICULOPATHY: ICD-10-CM

## 2018-05-10 DIAGNOSIS — G89.29 CHRONIC LOW BACK PAIN WITH RIGHT-SIDED SCIATICA, UNSPECIFIED BACK PAIN LATERALITY: ICD-10-CM

## 2018-05-10 PROCEDURE — 97110 THERAPEUTIC EXERCISES: CPT

## 2018-05-10 NOTE — THERAPY TREATMENT NOTE
"    Outpatient Physical Therapy Ortho Treatment Note  [facility]     Patient Name: Brittni Hagen  : 1960  MRN: [id number]  Today's Date: 5/10/2018      Visit Date: 05/10/2018     Subjective Improvement 67%  Visits   Visits approved 75 per year  RTMD PRN  Recert date 2018    S/P lumbar laminectomy on 2017    Visit Dx:    ICD-10-CM ICD-9-CM   1. Lumbosacral neuritis M54.17 724.4   2. Lumbosacral radiculopathy M54.17 724.4   3. Chronic low back pain with right-sided sciatica, unspecified back pain laterality M54.41 724.2    G89.29 724.3     338.29       Patient Active Problem List   Diagnosis   • Type 2 diabetes mellitus   • Hyperlipidemia   • Essential hypertension   • Gastroesophageal reflux disease without esophagitis        Past Medical History:   Diagnosis Date   • Alpha galactosidase deficiency     red meat allergy; \"alpha gal syndrome\"   • Anemia    • Arthritis    • Asthma    • Diabetes mellitus    • Endometriosis    • Fibromyalgia    • Goiter    • Hyperlipidemia    • Hypertension     takes bp meds to help kidneys   • Neuropathy    • Sleep apnea    • Tinnitus    • Vitamin D deficiency         Past Surgical History:   Procedure Laterality Date   • CARPAL TUNNEL RELEASE Bilateral    • LAMINECTOMY  2017   • LAPAROSCOPIC TUBAL LIGATION     • TUBAL ABDOMINAL LIGATION                               PT Assessment/Plan     Row Name 05/10/18 4350          PT Assessment    Assessment Comments Patient did have decrease pain after aquatics.    -CP        PT Plan    PT Frequency 2x/week  -CP     Predicted Duration of Therapy Intervention (OT Eval) 3 weeks  -CP     PT Plan Comments Cont with POC.  Progressing patient in core stab.   -CP        Clinical Impression    Predicted Duration of Therapy Intervention (days/wks) 3 weeks  -CP       User Key  (r) = Recorded By, (t) = Taken By, (c) = Cosigned By    Initials Name Provider Type    CP Rachel Zamora PTA Physical Therapy " Assistant                    Exercises     Row Name 05/10/18 1600             Subjective Comments    Subjective Comments Patient states that she tried to cut grass the other day but wasnt able to complete it.  Cont to report low back pain.  -CP         Subjective Pain    Able to rate subjective pain? yes  -CP      Pre-Treatment Pain Level 4  -CP         Aquatics    Aquatics performed? Yes  -CP         Exercise 1    Exercise Name 1 Aqu wal 3 way  -CP      Time 1 5' each  -CP         Exercise 2    Exercise Name 2 aqu cr/tr  -CP      Reps 2 20  -CP         Exercise 3    Exercise Name 3 aqu mini squats  -CP      Reps 3 20  -CP         Exercise 4    Exercise Name 4 aqu hip 3 way  -CP      Reps 4 20  -CP         Exercise 5    Exercise Name 5 aqu float steps  -CP      Sets 5 2  -CP      Reps 5 10  -CP      Time 5 yellow ring float  -CP      Additional Comments bilateral  -CP         Exercise 6    Exercise Name 6 aqu trunk rotation iwth cane  -CP      Reps 6 20  -CP         Exercise 7    Exercise Name 7 aqu paddle wheel with cane  -CP      Reps 7 20  -CP      Time 7  each  -CP      Additional Comments cw/ccw  -CP         Exercise 8    Reps 8 20  -CP      Time (Seconds) 8 aqu push pull with ABC board  -CP         Exercise 9    Exercise Name 9 aqu shoulder 3 way with paddles  -CP      Reps 9 20  -CP         Exercise 10    Exercise Name 10 aqu standing hs stretch  -CP      Sets 10 3  -CP      Time 10 30  -CP      Additional Comments bilateral  -CP         Exercise 11    Exercise Name 11 aqu deep hang bike  -CP      Time 11 5  -CP         Exercise 12    Exercise Name 12 aqu deep hang  -CP      Time 12 6  -CP        User Key  (r) = Recorded By, (t) = Taken By, (c) = Cosigned By    Initials Name Provider Type    CP Rachel Zamora PTA Physical Therapy Assistant                               PT OP Goals     Row Name 05/10/18 1700          PT Short Term Goals    STG Date to Achieve 05/16/18  -CP     STG 1 Patient will report a  50% subjective improvement  -CP     STG 1 Progress Met  -CP     STG 2 Patient will report pain </= 5/10 at all times  -CP     STG 2 Progress Progressing  -CP     STG 3 Patient will improve lumbar AROM to 75% of full range or greater  -CP     STG 3 Progress Progressing  -CP        Long Term Goals    LTG Date to Achieve 05/24/18  -CP     LTG 1 Patient will be indepedent with HEP  -CP     LTG 1 Progress Progressing  -CP     LTG 2 Patient will improve R hip flexion and ABD MMT from to 4+/5  -CP     LTG 2 Progress Progressing  -CP     LTG 3 Patient will have lumbar AROM all planes WFL and pain </= 1/10  -CP     LTG 3 Progress Progressing  -CP     LTG 4 Patient will improve modified Oswestry from 27/50 to </= 15/50  -CP     LTG 4 Progress Progressing  -CP     LTG 5 Patient will improve B HS flexibility to WFL  -CP     LTG 5 Progress Progressing  -CP     LTG 6 Patient will be able to ambulate > 30 min with pain </= 2/10  -CP     LTG 6 Progress Progressing  -CP        Time Calculation    PT Goal Re-Cert Due Date 05/24/18  -CP       User Key  (r) = Recorded By, (t) = Taken By, (c) = Cosigned By    Initials Name Provider Type    CP Rachel Zamora PTA Physical Therapy Assistant                         Time Calculation:   Start Time: 1620  Stop Time: 1730  Time Calculation (min): 70 min  Total Timed Code Minutes- PT: 70 minute(s)    Therapy Charges for Today     Code Description Service Date Service Provider Modifiers Qty    11734426896 HC PT THER PROC EA 15 MIN 5/10/2018 Rachel Zamora PTA GP 5                    Rachel Zamora PTA  5/10/2018

## 2018-05-14 ENCOUNTER — HOSPITAL ENCOUNTER (OUTPATIENT)
Dept: PHYSICAL THERAPY | Facility: HOSPITAL | Age: 58
Setting detail: THERAPIES SERIES
Discharge: HOME | End: 2018-05-14

## 2018-05-14 DIAGNOSIS — G89.29 CHRONIC LOW BACK PAIN WITH RIGHT-SIDED SCIATICA, UNSPECIFIED BACK PAIN LATERALITY: ICD-10-CM

## 2018-05-14 DIAGNOSIS — M54.41 CHRONIC LOW BACK PAIN WITH RIGHT-SIDED SCIATICA, UNSPECIFIED BACK PAIN LATERALITY: ICD-10-CM

## 2018-05-14 DIAGNOSIS — M54.17 LUMBOSACRAL NEURITIS: Primary | ICD-10-CM

## 2018-05-14 DIAGNOSIS — M54.17 LUMBOSACRAL RADICULOPATHY: ICD-10-CM

## 2018-05-14 PROCEDURE — 97110 THERAPEUTIC EXERCISES: CPT

## 2018-05-14 PROCEDURE — 97035 APP MDLTY 1+ULTRASOUND EA 15: CPT

## 2018-05-14 NOTE — THERAPY TREATMENT NOTE
"    Outpatient Physical Therapy Ortho Treatment Note  [facility]     Patient Name: Brittni Hagen  : 1960  MRN: [id number]  Today's Date: 2018      Visit Date: 2018     Subjective Improvement 67%  Visits   Visits approved 75  RTMD PRN  Recert 2018    S/P Lumbar laminectomy on 2017      Visit Dx:    ICD-10-CM ICD-9-CM   1. Lumbosacral neuritis M54.17 724.4   2. Lumbosacral radiculopathy M54.17 724.4   3. Chronic low back pain with right-sided sciatica, unspecified back pain laterality M54.41 724.2    G89.29 724.3     338.29       Patient Active Problem List   Diagnosis   • Type 2 diabetes mellitus   • Hyperlipidemia   • Essential hypertension   • Gastroesophageal reflux disease without esophagitis        Past Medical History:   Diagnosis Date   • Alpha galactosidase deficiency     red meat allergy; \"alpha gal syndrome\"   • Anemia    • Arthritis    • Asthma    • Diabetes mellitus    • Endometriosis    • Fibromyalgia    • Goiter    • Hyperlipidemia    • Hypertension     takes bp meds to help kidneys   • Neuropathy    • Sleep apnea    • Tinnitus    • Vitamin D deficiency         Past Surgical History:   Procedure Laterality Date   • CARPAL TUNNEL RELEASE Bilateral    • LAMINECTOMY  2017   • LAPAROSCOPIC TUBAL LIGATION     • TUBAL ABDOMINAL LIGATION                               PT Assessment/Plan     Row Name 18 1315          PT Assessment    Assessment Comments Tolerated new ther ex well with reports of tightness vs pain in low back.  -CP        PT Plan    PT Frequency 2x/week  -CP     PT Plan Comments aquatics next visit  -CP        Clinical Impression    Predicted Duration of Therapy Intervention (days/wks) 3 weeks  -CP       User Key  (r) = Recorded By, (t) = Taken By, (c) = Cosigned By    Initials Name Provider Type    CP Rachel Zamora, PTA Physical Therapy Assistant                Modalities     Row Name 18 1702             Moist Heat     " Applied Yes  -CP      Location low back in prone  -CP      Rx Minutes 10 mins  -CP      MH S/P Rx Yes  -CP         Ultrasound 98410    Location low back  -CP      Rx Minutes 8  -CP      Duty Cycle 100  -CP      Frequency 1.0 MHz  -CP      Intensity - Wts/cm 1.5  -CP        User Key  (r) = Recorded By, (t) = Taken By, (c) = Cosigned By    Initials Name Provider Type    CP Rachel Zamora PTA Physical Therapy Assistant                Exercises     Row Name 05/14/18 1700             Subjective Comments    Subjective Comments Patient states tthat she worked extra on Saturday and alison still sore.  She might have done too much over the weekend.  -CP         Subjective Pain    Able to rate subjective pain? yes  -CP      Pre-Treatment Pain Level 4  -CP      Post-Treatment Pain Level 5  -CP         Aquatics    Aquatics performed? No  -CP         Exercise 1    Exercise Name 1 See modalities  -CP         Exercise 2    Exercise Name 2 Pro II level 3  -CP      Time 2 10  -CP         Exercise 3    Exercise Name 3 Incline stretch  -CP      Sets 3 3  -CP      Time 3 30  -CP         Exercise 4    Exercise Name 4 Standing HS stretch  -CP      Sets 4 3  -CP      Time 4 30  -CP      Additional Comments bilateral  -CP         Exercise 5    Reps 5 20  -CP      Time (Seconds) 5 plank at wall with neut ral LE and pelvic drivers.;  LE cross step with opposit cspine rotation  -CP         Exercise 6    Sets 6 2  -CP      Reps 6 10  -CP      Time (Seconds) 6 Mini squat matix  -CP         Exercise 7    Sets 7 2  -CP      Reps 7 10  -CP      Time (Seconds) 7 ofeldt SL  -CP         Exercise 8    Sets 8 2  -CP      Reps 8 10  -CP      Time (Seconds) 8 AB roller on plinth table  -CP         Exercise 9    Exercise Name 9 sit to stand independent with back ext  -CP      Sets 9 2  -CP      Reps 9 10  -CP         Exercise 10    Exercise Name 10 quick walk  -CP      Time 10 6  -CP        User Key  (r) = Recorded By, (t) = Taken By, (c) = Cosigned By     Initials Name Provider Type    CP Rachel Zamora PTA Physical Therapy Assistant                               PT OP Goals     Row Name 05/14/18 1700          PT Short Term Goals    STG Date to Achieve 05/16/18  -CP     STG 1 Patient will report a 50% subjective improvement  -CP     STG 1 Progress Met  -CP     STG 2 Patient will report pain </= 5/10 at all times  -CP     STG 2 Progress Progressing  -CP     STG 3 Patient will improve lumbar AROM to 75% of full range or greater  -CP     STG 3 Progress Progressing  -CP        Long Term Goals    LTG Date to Achieve 05/24/18  -CP     LTG 1 Patient will be indepedent with HEP  -CP     LTG 1 Progress Progressing  -CP     LTG 2 Patient will improve R hip flexion and ABD MMT from to 4+/5  -CP     LTG 2 Progress Progressing  -CP     LTG 3 Patient will have lumbar AROM all planes WFL and pain </= 1/10  -CP     LTG 3 Progress Progressing  -CP     LTG 4 Patient will improve modified Oswestry from 27/50 to </= 15/50  -CP     LTG 4 Progress Progressing  -CP     LTG 5 Patient will improve B HS flexibility to WFL  -CP     LTG 5 Progress Progressing  -CP     LTG 6 Patient will be able to ambulate > 30 min with pain </= 2/10  -CP     LTG 6 Progress Progressing  -CP        Time Calculation    PT Goal Re-Cert Due Date 05/24/18  -CP       User Key  (r) = Recorded By, (t) = Taken By, (c) = Cosigned By    Initials Name Provider Type    CP Rachel Zamora PTA Physical Therapy Assistant                         Time Calculation:   Start Time: 1642  Stop Time: 1800  Time Calculation (min): 78 min  Total Timed Code Minutes- PT: 68 minute(s)    Therapy Charges for Today     Code Description Service Date Service Provider Modifiers Qty    06714509609 HC PT ULTRASOUND EA 15 MIN 5/14/2018 Rachel Zamora PTA GP 1    42733851484 HC PT THER PROC EA 15 MIN 5/14/2018 Rachel Zamora PTA GP 4                    Rachel Zamora PTA  5/14/2018

## 2018-05-17 ENCOUNTER — HOSPITAL ENCOUNTER (OUTPATIENT)
Dept: PHYSICAL THERAPY | Facility: HOSPITAL | Age: 58
Setting detail: THERAPIES SERIES
Discharge: HOME | End: 2018-05-17

## 2018-05-17 DIAGNOSIS — M54.41 CHRONIC LOW BACK PAIN WITH RIGHT-SIDED SCIATICA, UNSPECIFIED BACK PAIN LATERALITY: ICD-10-CM

## 2018-05-17 DIAGNOSIS — M54.17 LUMBOSACRAL RADICULOPATHY: ICD-10-CM

## 2018-05-17 DIAGNOSIS — G89.29 CHRONIC LOW BACK PAIN WITH RIGHT-SIDED SCIATICA, UNSPECIFIED BACK PAIN LATERALITY: ICD-10-CM

## 2018-05-17 DIAGNOSIS — M54.17 LUMBOSACRAL NEURITIS: Primary | ICD-10-CM

## 2018-05-17 PROCEDURE — 97110 THERAPEUTIC EXERCISES: CPT

## 2018-05-17 NOTE — THERAPY TREATMENT NOTE
"    Outpatient Physical Therapy Ortho Treatment Note  [facility]     Patient Name: Brittni Hagen  : 1960  MRN: [id number]  Today's Date: 2018      Visit Date: 2018    Subjective Improvement 65-67%  Visits   Visits approved [facility]  RTMD PRN  Recert Date 2018    S/P lumbar laminectomy on 2017    Visit Dx:    ICD-10-CM ICD-9-CM   1. Lumbosacral neuritis M54.17 724.4   2. Lumbosacral radiculopathy M54.17 724.4   3. Chronic low back pain with right-sided sciatica, unspecified back pain laterality M54.41 724.2    G89.29 724.3     338.29       Patient Active Problem List   Diagnosis   • Type 2 diabetes mellitus   • Hyperlipidemia   • Essential hypertension   • Gastroesophageal reflux disease without esophagitis        Past Medical History:   Diagnosis Date   • Alpha galactosidase deficiency     red meat allergy; \"alpha gal syndrome\"   • Anemia    • Arthritis    • Asthma    • Diabetes mellitus    • Endometriosis    • Fibromyalgia    • Goiter    • Hyperlipidemia    • Hypertension     takes bp meds to help kidneys   • Neuropathy    • Sleep apnea    • Tinnitus    • Vitamin D deficiency         Past Surgical History:   Procedure Laterality Date   • CARPAL TUNNEL RELEASE Bilateral    • LAMINECTOMY  2017   • LAPAROSCOPIC TUBAL LIGATION     • TUBAL ABDOMINAL LIGATION                               PT Assessment/Plan     Row Name 18 1749          PT Assessment    Assessment Comments Patient has been slow to verbally report much improvement after attending 25 therapy appointments.  Patient is independent with aquatic ex.  Patient reported no increase pain with any ther ex this date.  -CP        PT Plan    PT Frequency 2x/week  -CP     PT Plan Comments Recheck next week.  D/C at end of week with one month free fitness membership so patient will be able to cont aquatics independently.  -CP        Clinical Impression    Predicted Duration of Therapy Intervention " (days/wks) 3 weeks  -CP       User Key  (r) = Recorded By, (t) = Taken By, (c) = Cosigned By    Initials Name Provider Type    CP Rachel Zamora PTA Physical Therapy Assistant                    Exercises     Row Name 05/17/18 1700             Subjective Comments    Subjective Comments Patient reports right side hip apin and right HS pain.  Relates it to the new ex that she did on last visit.  -CP         Subjective Pain    Able to rate subjective pain? yes  -CP      Pre-Treatment Pain Level 6  -CP      Post-Treatment Pain Level 4  -CP      Subjective Pain Comment 3  -CP         Aquatics    Aquatics performed? Yes  -CP         Exercise 1    Exercise Name 1 aqu walk 3 way  -CP      Time 1 5' each  -CP         Exercise 2    Exercise Name 2 aqu cr/tr  -CP      Time 2 20  -CP         Exercise 3    Exercise Name 3 aqu mini squats  -CP      Reps 3 20  -CP         Exercise 4    Exercise Name 4 aqu step up forward  -CP      Sets 4 2  -CP      Reps 4 10  -CP      Time 4 bilateral  -CP         Exercise 5    Exercise Name 5 aqu lat step up  -CP      Sets 5 2  -CP      Reps 5 10  -CP      Time 5 bilateral  -CP         Exercise 6    Exercise Name 6 aqu hs stretches   -CP      Sets 6 3  -CP      Time 6 30  -CP      Additional Comments bilateral  -CP         Exercise 7    Exercise Name 7 aqu float step  -CP      Sets 7 2  -CP      Reps 7 10  -CP      Time 7 yellow  -CP      Additional Comments bilateral  -CP         Exercise 8    Sets 8 2  -CP      Reps 8 10  -CP      Time 8 bilateral  -CP      Time (Seconds) 8 aqu hip 3 way  -CP         Exercise 9    Exercise Name 9 aqu core stab with shoulder 3 way  -CP      Reps 9 15  -CP      Time 9 paddles  -CP         Exercise 10    Exercise Name 10 aqu core stab with push pull  -CP      Reps 10 20  -CP      Time 10 abc board  -CP         Exercise 11    Exercise Name 11 aqu deep hang  -CP      Time 11 3  -CP        User Key  (r) = Recorded By, (t) = Taken By, (c) = Cosigned By    Initials  Name Provider Type    CP Rachel Zamora PTA Physical Therapy Assistant                               PT OP Goals     Row Name 05/17/18 1700          PT Short Term Goals    STG Date to Achieve 05/16/18  -CP     STG 1 Patient will report a 50% subjective improvement  -CP     STG 1 Progress Met  -CP     STG 2 Patient will report pain </= 5/10 at all times  -CP     STG 2 Progress Progressing  -CP     STG 3 Patient will improve lumbar AROM to 75% of full range or greater  -CP     STG 3 Progress Progressing  -CP        Long Term Goals    LTG Date to Achieve 05/24/18  -CP     LTG 1 Patient will be indepedent with HEP  -CP     LTG 1 Progress Progressing  -CP     LTG 2 Patient will improve R hip flexion and ABD MMT from to 4+/5  -CP     LTG 2 Progress Progressing  -CP     LTG 3 Patient will have lumbar AROM all planes WFL and pain </= 1/10  -CP     LTG 3 Progress Progressing  -CP     LTG 4 Patient will improve modified Oswestry from 27/50 to </= 15/50  -CP     LTG 4 Progress Progressing  -CP     LTG 5 Patient will improve B HS flexibility to WFL  -CP     LTG 5 Progress Progressing  -CP     LTG 6 Patient will be able to ambulate > 30 min with pain </= 2/10  -CP     LTG 6 Progress Progressing  -CP        Time Calculation    PT Goal Re-Cert Due Date 05/24/18  -CP       User Key  (r) = Recorded By, (t) = Taken By, (c) = Cosigned By    Initials Name Provider Type    CP Rachel Zamora PTA Physical Therapy Assistant                         Time Calculation:   Start Time: 1650  Stop Time: 1744  Time Calculation (min): 54 min  Total Timed Code Minutes- PT: 54 minute(s)    Therapy Charges for Today     Code Description Service Date Service Provider Modifiers Qty    99707037184 HC PT THER PROC EA 15 MIN 5/17/2018 Rachel Zamora PTA GP 4                    Rachel Zamora PTA  5/17/2018

## 2018-05-21 ENCOUNTER — HOSPITAL ENCOUNTER (OUTPATIENT)
Dept: PHYSICAL THERAPY | Facility: HOSPITAL | Age: 58
Setting detail: THERAPIES SERIES
Discharge: HOME | End: 2018-05-21

## 2018-05-21 DIAGNOSIS — M54.17 LUMBOSACRAL RADICULOPATHY: ICD-10-CM

## 2018-05-21 DIAGNOSIS — M54.41 CHRONIC LOW BACK PAIN WITH RIGHT-SIDED SCIATICA, UNSPECIFIED BACK PAIN LATERALITY: ICD-10-CM

## 2018-05-21 DIAGNOSIS — M54.17 LUMBOSACRAL NEURITIS: Primary | ICD-10-CM

## 2018-05-21 DIAGNOSIS — G89.29 CHRONIC LOW BACK PAIN WITH RIGHT-SIDED SCIATICA, UNSPECIFIED BACK PAIN LATERALITY: ICD-10-CM

## 2018-05-21 PROCEDURE — 97110 THERAPEUTIC EXERCISES: CPT

## 2018-05-21 PROCEDURE — G0283 ELEC STIM OTHER THAN WOUND: HCPCS

## 2018-05-21 NOTE — THERAPY TREATMENT NOTE
"    Outpatient Physical Therapy Ortho Treatment Note  [facility]     Patient Name: Brittni Hagen  : 1960  MRN: [id number]  Today's Date: 2018      Visit Date: 2018      Subjective Improvement I dont know now  Visits   Visits approved 75  RTMD PRN  Recert Date 2018    S/P lumbar laminectomy on 2017  Visit Dx:    ICD-10-CM ICD-9-CM   1. Lumbosacral neuritis M54.17 724.4   2. Lumbosacral radiculopathy M54.17 724.4   3. Chronic low back pain with right-sided sciatica, unspecified back pain laterality M54.41 724.2    G89.29 724.3     338.29       Patient Active Problem List   Diagnosis   • Type 2 diabetes mellitus   • Hyperlipidemia   • Essential hypertension   • Gastroesophageal reflux disease without esophagitis        Past Medical History:   Diagnosis Date   • Alpha galactosidase deficiency     red meat allergy; \"alpha gal syndrome\"   • Anemia    • Arthritis    • Asthma    • Diabetes mellitus    • Endometriosis    • Fibromyalgia    • Goiter    • Hyperlipidemia    • Hypertension     takes bp meds to help kidneys   • Neuropathy    • Sleep apnea    • Tinnitus    • Vitamin D deficiency         Past Surgical History:   Procedure Laterality Date   • CARPAL TUNNEL RELEASE Bilateral    • LAMINECTOMY  2017   • LAPAROSCOPIC TUBAL LIGATION     • TUBAL ABDOMINAL LIGATION                               PT Assessment/Plan     Row Name 18 1741          PT Assessment    Assessment Comments Patient requires VC for ex. Patient has reported little subjective improvement.  At this time, patient does not appear to be compliant with HEP.  -CP        PT Plan    PT Frequency 2x/week  -CP     Predicted Duration of Therapy Intervention (OT Eval) 3 weeks  -CP     PT Plan Comments recheck next visit  -CP       User Key  (r) = Recorded By, (t) = Taken By, (c) = Cosigned By    Initials Name Provider Type    CP Rachel Zamora PTA Physical Therapy Assistant                Modalities  " "   Row Name 05/21/18 1700             Moist Heat    MH Applied Yes  -CP      Location low back in prone  -CP      Rx Minutes --   15 minutes with IFC  -CP      MH S/P Rx Yes  -CP         ELECTRICAL STIMULATION    Attended/Unattended Unattended  -CP      Stimulation Type IFC  -CP      Location/Electrode Placement/Other low back in sitting  -CP      Rx Minutes 15 mins  -CP        User Key  (r) = Recorded By, (t) = Taken By, (c) = Cosigned By    Initials Name Provider Type    CP Rachel Zamora PTA Physical Therapy Assistant                Exercises     Row Name 05/21/18 1700             Subjective Comments    Subjective Comments Patient states that she has not be given a \"special chair\" for work yet.  Patient reports increase pain when getting out of bed this morning.  Patient now doesnt know how much better she is.  She reports a cramp in the right low back first thing in the morning.  Patient tries to walk to relieve the cramp.  -CP         Subjective Pain    Able to rate subjective pain? yes  -CP      Pre-Treatment Pain Level 4  -CP      Post-Treatment Pain Level 4  -CP         Aquatics    Aquatics performed? No  -CP         Exercise 1    Exercise Name 1 Pro II level 4  -CP      Time 1 10  -CP         Exercise 2    Exercise Name 2 Incline stretch  -CP      Sets 2 3  -CP      Time 2 30  -CP         Exercise 3    Exercise Name 3 Standing HS stretch  -CP      Sets 3 3  -CP      Time 3 30  -CP         Exercise 4    Exercise Name 4 Step up with opposite LE ext  -CP      Sets 4 2  -CP      Reps 4 10  -CP      Time 4 bilateral  -CP         Exercise 5    Sets 5 2  -CP      Reps 5 10  -CP      Time (Seconds) 5 plank at wall with LE Neutral with pelvic .  -CP         Exercise 6    Exercise Name 6 Plank at wall with LE stejp out to same side with pelvic   -CP      Reps 6 2  -CP      Time 6 10  -CP         Exercise 7    Sets 7 2  -CP      Reps 7 10  -CP      Time (Seconds) 7 ofeldt SL  -CP        User Key  (r) = " Recorded By, (t) = Taken By, (c) = Cosigned By    Initials Name Provider Type    CP Rachel Zamora PTA Physical Therapy Assistant                               PT OP Goals     Row Name 05/21/18 1700          PT Short Term Goals    STG Date to Achieve 05/16/18  -CP     STG 1 Patient will report a 50% subjective improvement  -CP     STG 1 Progress Met  -CP     STG 2 Patient will report pain </= 5/10 at all times  -CP     STG 2 Progress Progressing  -CP     STG 3 Patient will improve lumbar AROM to 75% of full range or greater  -CP     STG 3 Progress Progressing  -CP        Long Term Goals    LTG Date to Achieve 05/24/18  -CP     LTG 1 Patient will be indepedent with HEP  -CP     LTG 1 Progress Progressing  -CP     LTG 2 Patient will improve R hip flexion and ABD MMT from to 4+/5  -CP     LTG 2 Progress Progressing  -CP     LTG 3 Patient will have lumbar AROM all planes WFL and pain </= 1/10  -CP     LTG 3 Progress Progressing  -CP     LTG 4 Patient will improve modified Oswestry from 27/50 to </= 15/50  -CP     LTG 4 Progress Progressing  -CP     LTG 5 Patient will improve B HS flexibility to WFL  -CP     LTG 5 Progress Progressing  -CP     LTG 6 Patient will be able to ambulate > 30 min with pain </= 2/10  -CP     LTG 6 Progress Progressing  -CP        Time Calculation    PT Goal Re-Cert Due Date 05/24/18  -CP       User Key  (r) = Recorded By, (t) = Taken By, (c) = Cosigned By    Initials Name Provider Type    CP Rachel Zamora PTA Physical Therapy Assistant          Therapy Education  Education Details: plank at  wall with LE neutral and pevlic   Given: HEP  Program: Reinforced  How Provided: Verbal, Demonstration  Provided to: Patient  Level of Understanding: Verbalized, Demonstrated              Time Calculation:   Start Time: 1645  Stop Time: 1753  Time Calculation (min): 68 min  Total Timed Code Minutes- PT: 45 minute(s)    Therapy Charges for Today     Code Description Service Date Service Provider  Modifiers Qty    86091740360 HC PT THER PROC EA 15 MIN 5/21/2018 Rachel Zamora, PTA GP 3    92232682646 HC PT ELECTRICAL STIM UNATTENDED 5/21/2018 Rachel Zamora, PTA  1    94482539975 HC PT THER SUPP EA 15 MIN 5/21/2018 Rachel Zamora, PTA GP 1                    Rachel Zamora, PTA  5/22/2018

## 2018-05-23 ENCOUNTER — HOSPITAL ENCOUNTER (OUTPATIENT)
Dept: PHYSICAL THERAPY | Facility: HOSPITAL | Age: 58
Setting detail: THERAPIES SERIES
Discharge: HOME | End: 2018-05-23

## 2018-05-23 DIAGNOSIS — M54.17 LUMBOSACRAL RADICULOPATHY: ICD-10-CM

## 2018-05-23 DIAGNOSIS — M54.17 LUMBOSACRAL NEURITIS: ICD-10-CM

## 2018-05-23 DIAGNOSIS — M54.41 CHRONIC LOW BACK PAIN WITH RIGHT-SIDED SCIATICA, UNSPECIFIED BACK PAIN LATERALITY: Primary | ICD-10-CM

## 2018-05-23 DIAGNOSIS — G89.29 CHRONIC LOW BACK PAIN WITH RIGHT-SIDED SCIATICA, UNSPECIFIED BACK PAIN LATERALITY: Primary | ICD-10-CM

## 2018-05-23 PROCEDURE — 97110 THERAPEUTIC EXERCISES: CPT

## 2018-05-23 NOTE — THERAPY DISCHARGE NOTE
"     Outpatient Physical Therapy Ortho Progress Note/Discharge Summary  [facility]     Patient Name: Brittni Hagen  : 1960  MRN: [id number]  Today's Date: 2018      Visit Date: 2018    Visit Dx:    ICD-10-CM ICD-9-CM   1. Chronic low back pain with right-sided sciatica, unspecified back pain laterality M54.41 724.2    G89.29 724.3     338.29   2. Lumbosacral radiculopathy M54.17 724.4   3. Lumbosacral neuritis M54.17 724.4       Patient Active Problem List   Diagnosis   • Type 2 diabetes mellitus   • Hyperlipidemia   • Essential hypertension   • Gastroesophageal reflux disease without esophagitis        Past Medical History:   Diagnosis Date   • Alpha galactosidase deficiency     red meat allergy; \"alpha gal syndrome\"   • Anemia    • Arthritis    • Asthma    • Diabetes mellitus    • Endometriosis    • Fibromyalgia    • Goiter    • Hyperlipidemia    • Hypertension     takes bp meds to help kidneys   • Neuropathy    • Sleep apnea    • Tinnitus    • Vitamin D deficiency         Past Surgical History:   Procedure Laterality Date   • CARPAL TUNNEL RELEASE Bilateral    • LAMINECTOMY  2017   • LAPAROSCOPIC TUBAL LIGATION     • TUBAL ABDOMINAL LIGATION               PT Ortho     Row Name 18 1701       Posture/Observations    Posture/Observations Comments Pt has forward flexed standing posture. MMT revealed the following: Bilat LE 4/5 for all planes, except bilat hip flex 3/5.  -MW       Gait/Stairs Assessment/Training    Comment (Gait/Stairs) Pt has forward flexed psoture w/ ambulation.   -      User Key  (r) = Recorded By, (t) = Taken By, (c) = Cosigned By    Initials Name Provider Type    CASANDRA Regan PT Physical Therapist                            PT Assessment/Plan     Row Name 18 1702          PT Assessment    Assessment Comments Pt tolerated tx and reassess well today. She has made mild strength and pain improvements over the past several weeks w/ " "aquatic therapy. Pt has made minimal progress towards established therapy goals to date. Based on pt's minimal improvement w/ PT intervention and her subjective improvement w/ aquatic rehab pt is appropriate for d/c w/ a comprehensive HEP at this time. HEP discussed and pt is going to begin aquatic exercise on her own outside of rehab.  Time spent discussing POC/HEP options and pt voiced understanding and stating she is ready for self-management of her back sxs. Pt instructed to f/u w/ rehab PRN.    -MW        PT Plan    PT Plan Comments D/C to HEP  -MW       User Key  (r) = Recorded By, (t) = Taken By, (c) = Cosigned By    Initials Name Provider Type    CASANDRA Regan PT Physical Therapist                    Exercises     Row Name 05/23/18 1700             Subjective Comments    Subjective Comments Pt states she feels okay today. She states she can tell aqautic exercise helps her to feel \"looser\". She states she cannot rate her improvement secondary to this being a \"hard question to answer\". Pt consents to reassess and tx.  -MW         Exercise 1    Exercise Name 1 Pro II level 4  -MW      Time 1 10  -MW         Exercise 2    Exercise Name 2 HEP review/discussion  -MW      Time 2 14'  -MW         Exercise 3    Exercise Name 3 Foam Roller stretch-HS  -MW      Time 3 3'  -MW        User Key  (r) = Recorded By, (t) = Taken By, (c) = Cosigned By    Initials Name Provider Type    MW Lori Regan PT Physical Therapist                               PT OP Goals     Row Name 05/23/18 1700          PT Short Term Goals    STG Date to Achieve 05/16/18  -MW     STG 1 Patient will report a 50% subjective improvement  -MW     STG 1 Progress Met  -MW     STG 2 Patient will report pain </= 5/10 at all times  -MW     STG 2 Progress Not Met  -MW     STG 3 Patient will improve lumbar AROM to 75% of full range or greater  -MW     STG 3 Progress Not Met  -MW        Long Term Goals    LTG Date to Achieve 05/24/18  -MW     LTG 1 " Patient will be indepedent with HEP  -MW     LTG 1 Progress Met  -MW     LTG 2 Patient will improve R hip flexion and ABD MMT from to 4+/5  -MW     LTG 2 Progress Not Met  -MW     LTG 3 Patient will have lumbar AROM all planes WFL and pain </= 1/10  -MW     LTG 3 Progress Not Met  -MW     LTG 4 Patient will improve modified Oswestry from 27/50 to </= 15/50  -MW     LTG 4 Progress Progressing  -MW     LTG 5 Patient will improve B HS flexibility to WFL  -MW     LTG 5 Progress Progressing  -MW     LTG 6 Patient will be able to ambulate > 30 min with pain </= 2/10  -MW     LTG 6 Progress Progressing  -MW       User Key  (r) = Recorded By, (t) = Taken By, (c) = Cosigned By    Initials Name Provider Type    CASANDRA Regan PT Physical Therapist               Outcome Measure Options: Modifed Owestry  Modified Oswestry  Modified Oswestry Score/Comments: 26/50      Time Calculation:   Start Time: 1649  Stop Time: 1716  Time Calculation (min): 27 min  Total Timed Code Minutes- PT: 27 minute(s)    Therapy Charges for Today     Code Description Service Date Service Provider Modifiers Qty    09775879772 HC PT THER PROC EA 15 MIN 5/23/2018 Lori Regan, PT GP 2              OP PT Discharge Summary  Date of Discharge: 05/23/18  Reason for Discharge: Lack of progress  Outcomes Achieved: Patient able to partially acheive established goals  Discharge Destination: Home with home program  Discharge Instructions/Additional Comments: Pt recieved 1 month free membership to [employer] upon d/c.       Lori Regan PT  5/23/2018

## 2018-05-29 ENCOUNTER — APPOINTMENT (OUTPATIENT)
Dept: PHYSICAL THERAPY | Facility: HOSPITAL | Age: 58
End: 2018-05-29

## 2018-05-31 ENCOUNTER — APPOINTMENT (OUTPATIENT)
Dept: PHYSICAL THERAPY | Facility: HOSPITAL | Age: 58
End: 2018-05-31

## 2018-10-22 ENCOUNTER — TRANSCRIBE ORDERS (OUTPATIENT)
Dept: MRI IMAGING | Facility: HOSPITAL | Age: 58
End: 2018-10-22

## 2018-10-22 DIAGNOSIS — M54.10 RADICULOPATHY OF LEG: ICD-10-CM

## 2018-10-22 DIAGNOSIS — M54.41 ACUTE RIGHT-SIDED LOW BACK PAIN WITH RIGHT-SIDED SCIATICA: Primary | ICD-10-CM

## 2018-10-26 ENCOUNTER — HOSPITAL ENCOUNTER (OUTPATIENT)
Dept: MRI IMAGING | Facility: HOSPITAL | Age: 58
Discharge: HOME | End: 2018-10-26
Attending: FAMILY MEDICINE | Admitting: FAMILY MEDICINE

## 2018-10-26 DIAGNOSIS — M54.41 ACUTE RIGHT-SIDED LOW BACK PAIN WITH RIGHT-SIDED SCIATICA: ICD-10-CM

## 2018-10-26 DIAGNOSIS — M54.10 RADICULOPATHY OF LEG: ICD-10-CM

## 2018-10-26 PROCEDURE — 72148 MRI LUMBAR SPINE W/O DYE: CPT

## 2019-03-11 DIAGNOSIS — R06.02 SOB (SHORTNESS OF BREATH): Primary | ICD-10-CM

## 2019-03-12 ENCOUNTER — OFFICE VISIT (OUTPATIENT)
Dept: CARDIOLOGY | Facility: CLINIC | Age: 59
End: 2019-03-12

## 2019-03-12 VITALS
DIASTOLIC BLOOD PRESSURE: 74 MMHG | HEART RATE: 94 BPM | WEIGHT: 163 LBS | SYSTOLIC BLOOD PRESSURE: 108 MMHG | HEIGHT: 65 IN | BODY MASS INDEX: 27.16 KG/M2

## 2019-03-12 DIAGNOSIS — E11.9 TYPE 2 DIABETES MELLITUS WITHOUT COMPLICATION, WITHOUT LONG-TERM CURRENT USE OF INSULIN (HCC): ICD-10-CM

## 2019-03-12 DIAGNOSIS — E78.00 PURE HYPERCHOLESTEROLEMIA: Primary | ICD-10-CM

## 2019-03-12 DIAGNOSIS — I10 ESSENTIAL HYPERTENSION: ICD-10-CM

## 2019-03-12 DIAGNOSIS — I20.9 ANGINA PECTORIS (HCC): ICD-10-CM

## 2019-03-12 PROCEDURE — 93000 ELECTROCARDIOGRAM COMPLETE: CPT | Performed by: INTERNAL MEDICINE

## 2019-03-12 PROCEDURE — 99204 OFFICE O/P NEW MOD 45 MIN: CPT | Performed by: INTERNAL MEDICINE

## 2019-03-12 RX ORDER — IBUPROFEN 600 MG/1
600 TABLET ORAL EVERY 8 HOURS PRN
COMMUNITY
End: 2020-08-05

## 2019-03-12 NOTE — PROGRESS NOTES
"  [facility] Cardiology  OFFICE NOTE    Brittni Hagen  58 y.o. female    03/12/2019  1. Pure hypercholesterolemia    2. Essential hypertension    3. Type 2 diabetes mellitus without complication, without long-term current use of insulin (CMS/[facility])    4. Angina pectoris (CMS/[facility])        Chief complaint -precordial chest pain radiating to right side    History of present Illness-58 old lady who has been a diabetic for about 12 years has been having right-sided chest pain radiating to right arm with shortness of breath.  She has history of headache with aura and almost a partial syncope.  She has history of 2 seizures and is being followed by Dr. Recinos in [place].  She is a diabetic and on metformin and takes statins.  Her blood pressure has been okay with losartan.  Denies any GI symptoms              Allergies   Allergen Reactions   • Beef-Derived Products Hives     Red meats, Alpha-gal Syndrome    • Amoxicillin    • Grass Hives   • Green Soap Tincture Itching   • Promethazine      Gave her the shakes   • Red Dye Itching         Past Medical History:   Diagnosis Date   • Alpha galactosidase deficiency     red meat allergy; \"alpha gal syndrome\"   • Anemia    • Arthritis    • Asthma    • Diabetes mellitus (CMS/[facility])    • Endometriosis    • Fibromyalgia    • Goiter 2008   • Hyperlipidemia    • Hypertension     takes bp meds to help kidneys   • Neuropathy    • Sleep apnea    • Tinnitus    • Vitamin D deficiency          Past Surgical History:   Procedure Laterality Date   • CARPAL TUNNEL RELEASE Bilateral    • LAMINECTOMY  12/06/2017   • LAPAROSCOPIC TUBAL LIGATION     • TUBAL ABDOMINAL LIGATION           Family History   Problem Relation Age of Onset   • Heart disease Mother    • Heart attack Father    • Scoliosis Sister    • Heart disease Maternal Aunt          Social History     Socioeconomic History   • Marital status:      Spouse name: Not on file   • Number of children: Not on " file   • Years of education: Not on file   • Highest education level: Not on file   Social Needs   • Financial resource strain: Not on file   • Food insecurity - worry: Not on file   • Food insecurity - inability: Not on file   • Transportation needs - medical: Not on file   • Transportation needs - non-medical: Not on file   Occupational History   • Not on file   Tobacco Use   • Smoking status: Never Smoker   • Smokeless tobacco: Never Used   Substance and Sexual Activity   • Alcohol use: No   • Drug use: No   • Sexual activity: Defer   Other Topics Concern   • Not on file   Social History Narrative   • Not on file         Current Outpatient Medications   Medication Sig Dispense Refill   • albuterol (PROVENTIL HFA;VENTOLIN HFA) 108 (90 BASE) MCG/ACT inhaler Inhale 2 puffs Every 6 (Six) Hours.     • atorvastatin (LIPITOR) 20 MG tablet Take 20 mg by mouth Every Night.     • beclomethasone (QVAR) 40 MCG/ACT inhaler Inhale 2 puffs Every 4 (Four) Hours As Needed.     • EPINEPHrine (EPIPEN JR) 0.15 MG/0.3ML solution auto-injector injection Inject 0.15 mg into the shoulder, thigh, or buttocks As Needed.     • gabapentin (NEURONTIN) 800 MG tablet Take 800 mg by mouth 3 (Three) Times a Day.     • glucose blood test strip      • ibuprofen (ADVIL,MOTRIN) 600 MG tablet Take 600 mg by mouth Every 8 (Eight) Hours As Needed for Mild Pain .     • levocetirizine (XYZAL) 5 MG tablet Take 5 mg by mouth Daily As Needed for allergies.     • losartan (COZAAR) 25 MG tablet Take 12.5 mg by mouth Daily.     • metFORMIN (GLUCOPHAGE) 500 MG tablet Take 500 mg by mouth 2 (Two) Times a Day With Meals.     • raNITIdine (ZANTAC) 150 MG tablet Take 300 mg by mouth Every Night.       No current facility-administered medications for this visit.          Review of Systems     Constitution: Denies any fatigue, fever or chills    HENT: Denies any headache, hearing impairment,     Eyes: Denies any blurring of vision, or photophobia     Cardivascular -  "As per history of present illness     Respiratory system-shortness of breath     Endocrine:   history of hyperlipidemia, diabetes,                         Musculoskeletal: History of arthritis of the knee    Gastrointestinal: No nausea, vomiting, or melena    Genitourinary: No dysuria or hematuria    Neurological:   Peripheral neuropathy and 2 episodes of seizures with possible migraine    Psychiatric/Behavioral:        No history of depression    Hematological- no history of easy bruising or any bleeding diathesis            OBJECTIVE    /74   Pulse 94   Ht 166 cm (65.35\")   Wt 73.9 kg (163 lb)   LMP  (LMP Unknown)   BMI 26.83 kg/m²       Physical Exam     Constitutional: is oriented to person, place, and time.     Skin-warm and dry    Well developed and nourished in no acute distress      Head: Normocephalic and atraumatic.     Eyes: Pupils are equal    Neck: Neck supple. No bruit in the carotids    Cardiovascular: Steen in the fifth intercostal space   Regular rate, and  Rhythm,    S1 greater than S2,     Pulmonary/Chest:   Air  Entry is equal on both sides  No wheezing or crackles,      Abdominal: Soft.  No hepatosplenomegaly, bowel sounds are present    Musculoskeletal: No kyphoscoliosis, no significant thickening of the joints    Neurological: is alert and oriented to person, place, and time.    cranial nerve are intact .   No motor or sensory deficit    Extremities-no edema, no radial femoral delay      Psychiatric: He has a normal mood and affect.                  His behavior is normal.           Procedures      Lab Results   Component Value Date    WBC 6.44 04/19/2018    HGB 13.3 04/19/2018    HCT 40.5 04/19/2018    MCV 87.1 04/19/2018     04/19/2018     Lab Results   Component Value Date    GLUCOSE 100 04/19/2018    BUN 11 04/19/2018    CREATININE 0.82 04/19/2018    EGFRIFNONA 72 04/19/2018    BCR 13.4 04/19/2018    CO2 29.0 04/19/2018    CALCIUM 9.9 04/19/2018    ALBUMIN 4.20 04/19/2018 "    AST 34 04/19/2018    ALT 24 04/19/2018     Lab Results   Component Value Date    CHOL 174 01/26/2017     Lab Results   Component Value Date    TRIG 124 01/26/2017     Lab Results   Component Value Date    HDL 61 01/26/2017     No components found for: LDLCALC  Lab Results   Component Value Date    LDL 87 01/26/2017     No results found for: HDLLDLRATIO  No components found for: CHOLHDL  No results found for: HGBA1C  Lab Results   Component Value Date    TSH 0.900 12/10/2017    K7CELMK 9.46 12/10/2017                  A/P  Precordial chest pain with history of diabetes for 12 years, hypertension and hyperlipidemia we will schedule for a Lexiscan nuclear stress test that she cannot exercise, EKG is normal she has peripheral neuropathy and knee arthritis.    Diabetes on metformin.  Hyperlipidemia on atorvastatin    Hypertension controlled with losartan    2 episodes of seizures and chronic headache with near syncope possibly neurological related and follows with a neurologist in [place].    Follow-up in 3 weeks after the stress test          This document has been electronically signed by Bro Hammond MD on March 12, 2019 2:52 PM       EMR Dragon/Transcription disclaimer:   Some of this note may be an electronic transcription/translation of spoken language to printed text. The electronic translation of spoken language may permit erroneous, or at times, nonsensical words or phrases to be inadvertently transcribed; Although I have reviewed the note for such errors, some may still exist.

## 2019-03-20 ENCOUNTER — APPOINTMENT (OUTPATIENT)
Dept: LAB | Facility: HOSPITAL | Age: 59
End: 2019-03-20

## 2019-03-20 ENCOUNTER — OFFICE VISIT (OUTPATIENT)
Dept: OBSTETRICS AND GYNECOLOGY | Facility: CLINIC | Age: 59
End: 2019-03-20

## 2019-03-20 VITALS
SYSTOLIC BLOOD PRESSURE: 138 MMHG | BODY MASS INDEX: 26.66 KG/M2 | DIASTOLIC BLOOD PRESSURE: 94 MMHG | WEIGHT: 160 LBS | HEIGHT: 65 IN

## 2019-03-20 DIAGNOSIS — Z01.419 ENCOUNTER FOR WELL WOMAN EXAM WITH ROUTINE GYNECOLOGICAL EXAM: Primary | ICD-10-CM

## 2019-03-20 DIAGNOSIS — Z12.39 SCREENING FOR MALIGNANT NEOPLASM OF BREAST: ICD-10-CM

## 2019-03-20 DIAGNOSIS — R35.0 URINARY FREQUENCY: ICD-10-CM

## 2019-03-20 DIAGNOSIS — N64.4 BREAST PAIN IN FEMALE: ICD-10-CM

## 2019-03-20 LAB
BILIRUB UR QL STRIP: NEGATIVE
CLARITY UR: CLEAR
COLOR UR: YELLOW
GLUCOSE UR STRIP-MCNC: NEGATIVE MG/DL
HGB UR QL STRIP.AUTO: ABNORMAL
KETONES UR QL STRIP: NEGATIVE
LEUKOCYTE ESTERASE UR QL STRIP.AUTO: NEGATIVE
NITRITE UR QL STRIP: NEGATIVE
PH UR STRIP.AUTO: 6 [PH] (ref 5–9)
PROT UR QL STRIP: NEGATIVE
SP GR UR STRIP: 1.01 (ref 1–1.03)
UROBILINOGEN UR QL STRIP: ABNORMAL

## 2019-03-20 PROCEDURE — 81003 URINALYSIS AUTO W/O SCOPE: CPT | Performed by: NURSE PRACTITIONER

## 2019-03-20 PROCEDURE — 87624 HPV HI-RISK TYP POOLED RSLT: CPT | Performed by: NURSE PRACTITIONER

## 2019-03-20 PROCEDURE — G0123 SCREEN CERV/VAG THIN LAYER: HCPCS | Performed by: NURSE PRACTITIONER

## 2019-03-20 PROCEDURE — 99386 PREV VISIT NEW AGE 40-64: CPT | Performed by: NURSE PRACTITIONER

## 2019-03-20 PROCEDURE — 87086 URINE CULTURE/COLONY COUNT: CPT | Performed by: NURSE PRACTITIONER

## 2019-03-20 NOTE — PROGRESS NOTES
Subjective   Brittni Hagen is a 58 y.o. Annual gyn exam, c/o left breast pain    Mammo: 2018, normal  Pap: 2016, normal     PMH: Angina pectoris, hypertension, GERD, COPD, T2D, fibromyalgia, neuropathy. SxHx: Tubal ligation , carpal tunnel release , lower lumbar laminectomy 2017.  About a week ago, pt experienced left breast pain for about two days.  The breast pain was tingling and sometimes sharp, it resolved spontaneously and she hasn't experienced it since.  Pt does breast exams occasionally, but not regularly.  Denies family history of breast cancer.  While here pt would like to be checked for a UTI, since she has a history, denies symptoms but reports she urinates often.  Natural menopause occurred about 5 years ago, reports occasional hot flashes.  She has a new partner and would like to discuss problems reaching orgasm during intercourse.        Gynecologic Exam   The patient's pertinent negatives include no genital itching, genital lesions, genital odor, genital rash, pelvic pain, vaginal bleeding or vaginal discharge. The patient is experiencing no pain. She is not pregnant. Pertinent negatives include no abdominal pain, anorexia, back pain, chills, constipation, diarrhea, discolored urine, dysuria, fever, flank pain, frequency, headaches, hematuria, joint pain, joint swelling, nausea, painful intercourse, rash, sore throat, urgency or vomiting. She is sexually active. No, her partner does not have an STD. She uses tubal ligation for contraception. She is postmenopausal. Her past medical history is significant for endometriosis. There is no history of an abdominal surgery, a  section, an ectopic pregnancy, a gynecological surgery, herpes simplex, menorrhagia, metrorrhagia, miscarriage, ovarian cysts, perineal abscess, PID, an STD, a terminated pregnancy or vaginosis.   Breast Problem   This is a new problem. The current episode started 1 to 4 weeks ago. The problem has been  resolved. Pertinent negatives include no abdominal pain, anorexia, arthralgias, change in bowel habit, chest pain, chills, congestion, coughing, diaphoresis, fatigue, fever, headaches, joint swelling, myalgias, nausea, neck pain, numbness, rash, sore throat, swollen glands, urinary symptoms, vertigo, visual change, vomiting or weakness.       The following portions of the patient's history were reviewed and updated as appropriate: allergies, current medications, past family history, past medical history, past social history, past surgical history and problem list.    Review of Systems   Constitutional: Positive for unexpected weight change. Negative for chills, diaphoresis, fatigue and fever.        Weight gain of 20-30 lbs in last 5 years   HENT: Negative for congestion and sore throat.    Respiratory: Negative for apnea, cough and shortness of breath.    Cardiovascular: Negative for chest pain and palpitations.   Gastrointestinal: Negative for abdominal pain, anorexia, change in bowel habit, constipation, diarrhea, nausea and vomiting.   Endocrine: Positive for cold intolerance and heat intolerance.   Genitourinary: Negative for decreased urine volume, difficulty urinating, dyspareunia, dysuria, enuresis, flank pain, frequency, genital sores, hematuria, menorrhagia, pelvic pain, urgency, vaginal bleeding, vaginal discharge and vaginal pain.   Musculoskeletal: Negative for arthralgias, back pain, joint pain, joint swelling, myalgias and neck pain.   Skin: Negative for rash.   Neurological: Negative for vertigo, weakness, numbness and headaches.   Psychiatric/Behavioral: Negative for sleep disturbance.         Objective   Physical Exam   Constitutional: She is oriented to person, place, and time. She appears well-developed and well-nourished.   Neck: No thyromegaly present.   Cardiovascular: Normal rate, regular rhythm, normal heart sounds and intact distal pulses.   Pulmonary/Chest: Effort normal and breath sounds  normal. Right breast exhibits no inverted nipple, no mass, no nipple discharge, no skin change and no tenderness. Left breast exhibits no inverted nipple, no mass, no nipple discharge, no skin change and no tenderness. Breasts are symmetrical.   Abdominal: Soft. Bowel sounds are normal. She exhibits no distension. There is no tenderness.   Genitourinary: Vagina normal and uterus normal. No breast discharge or bleeding. Pelvic exam was performed with patient supine. There is no rash, tenderness, lesion or injury on the right labia. There is no rash, tenderness, lesion or injury on the left labia. Cervix exhibits no motion tenderness, no discharge and no friability. Right adnexum displays no mass, no tenderness and no fullness. Left adnexum displays no mass, no tenderness and no fullness.   Genitourinary Comments: Pap obtained.   Lymphadenopathy:     She has no axillary adenopathy.        Right: No inguinal adenopathy present.        Left: No inguinal adenopathy present.   Neurological: She is alert and oriented to person, place, and time.   Skin: Skin is warm, dry and intact.   Psychiatric: She has a normal mood and affect. Her speech is normal and behavior is normal.   Nursing note and vitals reviewed.        Assessment/Plan   Brtitni was seen today for gynecologic exam.    Diagnoses and all orders for this visit:    Encounter for well woman exam with routine gynecological exam  -     Liquid-based Pap Smear, Screening    Urinary frequency  -     Urinalysis without microscopic (no culture) - Urine, Clean Catch  -     Urine Culture - Urine, Urine, Clean Catch    Breast pain in female      Pt educated and encouraged to continue monthly SBEs.  I did not note any abnormalites during today's breast exam. Pt needs to decrease her coffee intake and increase her water intake which will help prevent the breast pain.   As far as reaching orgasm, recommended position changes during intercourse, if she is able to make herself  orgasm then her parter will be able to as well.  Urinalysis and culture to R/o UTI.  A normal pap letter will be mailed to the patient in about 2 weeks, if abnormal we will call her.  RTC in 1 year for annual gyn exam.

## 2019-03-22 LAB — BACTERIA SPEC AEROBE CULT: NORMAL

## 2019-03-28 LAB
GEN CATEG CVX/VAG CYTO-IMP: NORMAL
LAB AP CASE REPORT: NORMAL
LAB AP GYN ADDITIONAL INFORMATION: NORMAL
LAB AP GYN OTHER FINDINGS: NORMAL
PATH INTERP SPEC-IMP: NORMAL
STAT OF ADQ CVX/VAG CYTO-IMP: NORMAL

## 2019-03-30 LAB — HPV I/H RISK 4 DNA CVX QL PROBE+SIG AMP: NEGATIVE

## 2019-04-03 ENCOUNTER — HOSPITAL ENCOUNTER (OUTPATIENT)
Dept: NUCLEAR MEDICINE | Facility: HOSPITAL | Age: 59
Discharge: HOME | End: 2019-04-03

## 2019-04-03 ENCOUNTER — HOSPITAL ENCOUNTER (OUTPATIENT)
Dept: CARDIOLOGY | Facility: HOSPITAL | Age: 59
Discharge: HOME | End: 2019-04-03

## 2019-04-03 ENCOUNTER — TELEPHONE (OUTPATIENT)
Dept: CARDIOLOGY | Facility: CLINIC | Age: 59
End: 2019-04-03

## 2019-04-03 LAB
BH CV STRESS BP STAGE 1: NORMAL
BH CV STRESS COMMENTS STAGE 1: NORMAL
BH CV STRESS DOSE REGADENOSON STAGE 1: 0.4
BH CV STRESS DURATION MIN STAGE 1: 0
BH CV STRESS DURATION SEC STAGE 1: 10
BH CV STRESS HR STAGE 1: 117
BH CV STRESS PROTOCOL 1: NORMAL
BH CV STRESS RECOVERY BP: NORMAL MMHG
BH CV STRESS RECOVERY HR: 101 BPM
BH CV STRESS STAGE 1: 1
LV EF NUC BP: 76 %
MAXIMAL PREDICTED HEART RATE: 164 BPM
PERCENT MAX PREDICTED HR: 112.2 %
STRESS BASELINE BP: NORMAL MMHG
STRESS BASELINE HR: 87 BPM
STRESS PERCENT HR: 132 %
STRESS POST ESTIMATED WORKLOAD: 1 METS
STRESS POST PEAK BP: NORMAL MMHG
STRESS POST PEAK HR: 184 BPM
STRESS TARGET HR: 139 BPM

## 2019-04-03 PROCEDURE — 25010000002 REGADENOSON 0.4 MG/5ML SOLUTION: Performed by: INTERNAL MEDICINE

## 2019-04-03 PROCEDURE — A9500 TC99M SESTAMIBI: HCPCS | Performed by: INTERNAL MEDICINE

## 2019-04-03 PROCEDURE — 78452 HT MUSCLE IMAGE SPECT MULT: CPT

## 2019-04-03 PROCEDURE — 93017 CV STRESS TEST TRACING ONLY: CPT

## 2019-04-03 PROCEDURE — 0 TECHNETIUM SESTAMIBI: Performed by: INTERNAL MEDICINE

## 2019-04-03 PROCEDURE — 78452 HT MUSCLE IMAGE SPECT MULT: CPT | Performed by: INTERNAL MEDICINE

## 2019-04-03 PROCEDURE — 93016 CV STRESS TEST SUPVJ ONLY: CPT | Performed by: INTERNAL MEDICINE

## 2019-04-03 PROCEDURE — 93018 CV STRESS TEST I&R ONLY: CPT | Performed by: INTERNAL MEDICINE

## 2019-04-03 RX ORDER — 0.9 % SODIUM CHLORIDE 0.9 %
10 VIAL (ML) INJECTION AS NEEDED
Status: DISCONTINUED | OUTPATIENT
Start: 2019-04-03 | End: 2019-04-04 | Stop reason: HOSPADM

## 2019-04-03 RX ADMIN — SODIUM CHLORIDE, PRESERVATIVE FREE 10 ML: 5 INJECTION INTRAVENOUS at 09:45

## 2019-04-03 RX ADMIN — TECHNETIUM TC 99M SESTAMIBI 1 DOSE: 1 INJECTION INTRAVENOUS at 07:10

## 2019-04-03 RX ADMIN — TECHNETIUM TC 99M SESTAMIBI 1 DOSE: 1 INJECTION INTRAVENOUS at 09:45

## 2019-04-03 RX ADMIN — REGADENOSON 0.4 MG: 0.08 INJECTION, SOLUTION INTRAVENOUS at 09:44

## 2019-04-03 NOTE — TELEPHONE ENCOUNTER
Tried to call patient to discuss stress test and need for an appt with Tino per Dr. Hammond request. No voicemail available to leave message. Called twice

## 2019-04-04 ENCOUNTER — TELEPHONE (OUTPATIENT)
Dept: CARDIOLOGY | Facility: CLINIC | Age: 59
End: 2019-04-04

## 2019-04-04 NOTE — TELEPHONE ENCOUNTER
Tried to call patient at the number listed regarding stress test. This number went straight to voicemail and voice mailbox was not set up. Unable to leave message

## 2019-04-09 ENCOUNTER — OFFICE VISIT (OUTPATIENT)
Dept: CARDIOLOGY | Facility: CLINIC | Age: 59
End: 2019-04-09

## 2019-04-09 VITALS
BODY MASS INDEX: 26.73 KG/M2 | HEIGHT: 65 IN | SYSTOLIC BLOOD PRESSURE: 128 MMHG | HEART RATE: 78 BPM | OXYGEN SATURATION: 99 % | DIASTOLIC BLOOD PRESSURE: 74 MMHG | WEIGHT: 160.4 LBS

## 2019-04-09 DIAGNOSIS — R00.2 PALPITATIONS: ICD-10-CM

## 2019-04-09 DIAGNOSIS — E78.00 PURE HYPERCHOLESTEROLEMIA: ICD-10-CM

## 2019-04-09 DIAGNOSIS — K21.9 GASTROESOPHAGEAL REFLUX DISEASE WITHOUT ESOPHAGITIS: ICD-10-CM

## 2019-04-09 DIAGNOSIS — I47.1 PAROXYSMAL SVT (SUPRAVENTRICULAR TACHYCARDIA) (HCC): ICD-10-CM

## 2019-04-09 DIAGNOSIS — I10 ESSENTIAL HYPERTENSION: ICD-10-CM

## 2019-04-09 DIAGNOSIS — E11.9 TYPE 2 DIABETES MELLITUS WITHOUT COMPLICATION, WITHOUT LONG-TERM CURRENT USE OF INSULIN (HCC): Primary | ICD-10-CM

## 2019-04-09 PROBLEM — I47.10 PAROXYSMAL SVT (SUPRAVENTRICULAR TACHYCARDIA): Status: ACTIVE | Noted: 2019-04-09

## 2019-04-09 PROCEDURE — 99205 OFFICE O/P NEW HI 60 MIN: CPT | Performed by: INTERNAL MEDICINE

## 2019-04-09 RX ORDER — LEVETIRACETAM 750 MG/1
750 TABLET ORAL 2 TIMES DAILY
COMMUNITY
End: 2023-02-17

## 2019-04-09 RX ORDER — CEFUROXIME AXETIL 500 MG/1
500 TABLET ORAL 2 TIMES DAILY
COMMUNITY
End: 2019-04-26

## 2019-04-09 NOTE — PROGRESS NOTES
Brittni Hagen  58 y.o. female    04/09/2019  1. Type 2 diabetes mellitus without complication, without long-term current use of insulin (CMS/[facility])    2. Pure hypercholesterolemia    3. Essential hypertension    4. Gastroesophageal reflux disease without esophagitis    5. Paroxysmal SVT (supraventricular tachycardia) (CMS/[facility])    6. Palpitations        History of Present Illness:    Patient's Body mass index is 26.69 kg/m². BMI is above normal parameters. Recommendations include: exercise counseling, nutrition counseling and referral to primary care.  58 old lady who recently evaluated by Dr. Hammond for chest pain underwent stress test low risk study with good heart function and developed supraventricular tachycardia at a rate of 180 bpm during stress test with a history of palpitation and associated chest pain/dizziness of more than 2 years duration with sudden onset and termination and referred for possible EP study and ablations and background history of diabetes, epilepsy, hyperlipidemia, hypothyroidism, hypertension has been a diabetic for about 12 years has been having right-sided chest pain radiating to right arm with shortness of breath.  She has history of headache with aura and almost a partial syncope.  She has history of 2 seizures and is being followed by Dr. Recinos in [place].        STRESS TEST 4/3/19  · Patient developed supraventricular tachycardia with diffuse nonspecific ST-T changes with a heart rate of up to 181 bpm during recovery which lasted for a couple of minutes after which the patient returned to sinus rhythm.  · Baseline EKG showed sinus rhythm with incomplete right bundle branch block.  · Left ventricular ejection fraction is hyperdynamic (Calculated EF > 70%).  · Myocardial perfusion imaging indicates a normal myocardial perfusion study with no evidence of ischemia.  · Impressions are consistent with a low risk study.    ECHO 1/2017  · All left ventricular wall segments  "contract normally.  · Left ventricular function is normal.  SUBJECTIVE:    Allergies   Allergen Reactions   • Beef-Derived Products Hives     Red meats, Alpha-gal Syndrome    • Amoxicillin    • Grass Hives   • Green Soap Tincture Itching   • Promethazine      Gave her the shakes   • Red Dye Itching         Past Medical History:   Diagnosis Date   • Alpha galactosidase deficiency     red meat allergy; \"alpha gal syndrome\"   • Anemia    • Arthritis    • Asthma    • COPD (chronic obstructive pulmonary disease) (CMS/[facility])    • Diabetes mellitus (CMS/[facility])    • Endometriosis    • Fibromyalgia    • Goiter 2008   • Hyperlipidemia    • Hypertension     takes bp meds to help kidneys   • Neuropathy    • Sleep apnea    • Tinnitus    • Vitamin D deficiency          Past Surgical History:   Procedure Laterality Date   • CARPAL TUNNEL RELEASE Bilateral    • LAMINECTOMY  12/06/2017   • LAPAROSCOPIC TUBAL LIGATION     • TUBAL ABDOMINAL LIGATION           Family History   Problem Relation Age of Onset   • Heart disease Mother    • HIV Mother    • Heart attack Father    • Scoliosis Sister    • Rheum arthritis Sister    • Heart disease Maternal Aunt    • Schizophrenia Son    • Diabetes Daughter    • Diverticulitis Daughter          Social History     Socioeconomic History   • Marital status:      Spouse name: Not on file   • Number of children: Not on file   • Years of education: Not on file   • Highest education level: Not on file   Tobacco Use   • Smoking status: Never Smoker   • Smokeless tobacco: Never Used   Substance and Sexual Activity   • Alcohol use: No   • Drug use: No   • Sexual activity: Defer         Current Outpatient Medications   Medication Sig Dispense Refill   • albuterol (PROVENTIL HFA;VENTOLIN HFA) 108 (90 BASE) MCG/ACT inhaler Inhale 2 puffs Every 6 (Six) Hours.     • atorvastatin (LIPITOR) 20 MG tablet Take 20 mg by mouth Every Night.     • beclomethasone (QVAR) 40 MCG/ACT inhaler Inhale 2 puffs Every 4 " (Four) Hours As Needed.     • cefuroxime (CEFTIN) 500 MG tablet Take 500 mg by mouth 2 (Two) Times a Day. Until gone     • EPINEPHrine (EPIPEN JR) 0.15 MG/0.3ML solution auto-injector injection Inject 0.15 mg into the shoulder, thigh, or buttocks As Needed.     • gabapentin (NEURONTIN) 800 MG tablet Take 800 mg by mouth 3 (Three) Times a Day.     • glucose blood test strip      • ibuprofen (ADVIL,MOTRIN) 600 MG tablet Take 600 mg by mouth Every 8 (Eight) Hours As Needed for Mild Pain .     • levETIRAcetam (KEPPRA) 750 MG tablet Take 750 mg by mouth 2 (Two) Times a Day.     • levocetirizine (XYZAL) 5 MG tablet Take 5 mg by mouth Daily As Needed for allergies.     • losartan (COZAAR) 25 MG tablet Take 12.5 mg by mouth Daily.     • metFORMIN (GLUCOPHAGE) 500 MG tablet Take 500 mg by mouth 2 (Two) Times a Day With Meals.     • raNITIdine (ZANTAC) 150 MG tablet Take 300 mg by mouth Every Night.       No current facility-administered medications for this visit.            Review of Systems:     Constitutional:  Denies recent weight loss, weight gain, fever or chills, no change in exercise tolerance.     HENT:  Denies any hearing loss, epistaxis, hoarseness, or difficulty speaking.     Eyes:  no blurring    Respiratory:  Denies dyspnea with exertion,no cough, wheezing, or hemoptysis.     Cardiovascular: See H&P     Gastrointestinal:  Denies change in bowel habits, dyspepsia, ulcer disease, hematochezia, or melena.     Endocrine: Negative for cold intolerance, heat intolerance, polydipsia, polyphagia and polyuria. Denies any history of weight change, polydipsia, polyuria.     Genitourinary: Negative.      Musculoskeletal: Denies any history of arthritic symptoms or back problems.     Skin:  Denies any change in hair or nails, rashes, or skin lesions.     Allergic/Immunologic: Negative.  Negative for environmental allergies, food allergies and immunocompromised state.     Neurological:  Denies any history of recurrent  "headaches, strokes, TIA, or seizure disorder.     Hematological: Denies any food allergies, seasonal allergies, bleeding disorders, or lymphadenopathy.     Psychiatric/Behavioral: Denies any history of depression, substance abuse, or change in cognitive function.       OBJECTIVE:    /74   Pulse 78   Ht 165.1 cm (65\")   Wt 72.8 kg (160 lb 6.4 oz)   LMP  (LMP Unknown)   SpO2 99%   BMI 26.69 kg/m²       Physical Exam:     Constitutional: Cooperative, alert and oriented, well-developed, well-nourished, in no acute distress.     HENT:   Head: Normocephalic, normal hair patterns, no masses or tenderness.  Ears, Nose, and Throat: No gross abnormalities. No pallor or cyanosis. Dentition good.   Eyes: EOMS intact, PERRL, conjunctivae and lids unremarkable. Fundoscopic exam and visual fields not performed.   Neck: No palpable masses or adenopathy, no thyromegaly, no JVD, carotid pulses are full and equal bilaterally and without  Bruits.     Cardiovascular: Regular rhythm, S1 and S2 normal, no S3 or S4. Apical impulse not displaced. No murmurs, gallops, or rubs detected.     Pulmonary/Chest: Chest: normal symmetry, no tenderness to palpation, normal respiratory excursion, no intercostal retraction, no use of accessory muscles. Pulmonary: Normal breath sounds. No rales or rhonchi.    Abdominal: Abdomen soft, bowel sounds normoactive, no masses, no hepatosplenomegaly, non-tender, no bruits.     Musculoskeletal: No deformities, clubbing, cyanosis, erythema, or edema observed. There are no spinal abnormalities noted. Normal muscle strength and tone. Pulses full and equal in all extremities, no bruits auscultated.     Neurological: No gross motor or sensory deficits noted, affect appropriate, oriented to time, person, place.     Skin: Warm and dry to the touch, no apparent skin lesions or masses noted.     Psychiatric: She has a normal mood and affect. Her behavior is normal. Judgment and thought content normal. "         Procedures      Lab Results   Component Value Date    WBC 6.44 04/19/2018    HGB 13.3 04/19/2018    HCT 40.5 04/19/2018    MCV 87.1 04/19/2018     04/19/2018     Lab Results   Component Value Date    GLUCOSE 100 04/19/2018    BUN 11 04/19/2018    CREATININE 0.82 04/19/2018    EGFRIFNONA 72 04/19/2018    BCR 13.4 04/19/2018    CO2 29.0 04/19/2018    CALCIUM 9.9 04/19/2018    ALBUMIN 4.20 04/19/2018    AST 34 04/19/2018    ALT 24 04/19/2018     Lab Results   Component Value Date    CHOL 174 01/26/2017     Lab Results   Component Value Date    TRIG 124 01/26/2017     Lab Results   Component Value Date    HDL 61 01/26/2017     No components found for: LDLCALC  Lab Results   Component Value Date    LDL 87 01/26/2017     No results found for: HDLLDLRATIO  No components found for: CHOLHDL  No results found for: HGBA1C  Lab Results   Component Value Date    TSH 0.900 12/10/2017    Q4WFPTZ 9.46 12/10/2017           ASSESSMENT AND PLAN:  #1 supraventricular tachycardia #2 palpitations #3 hypertension with hypertensive heart disease #4 diabetes #5 history of chest pain with a normal stress test  58 years old patient recently evaluated with Dr. Hammond with a background history of hypertension, hypertensive heart disease, diabetes, history of palpitation with associated chest pain or dizziness and have documented supraventricular tachycardia during stress test with heart rate 180 bpm.  She had sudden onset and termination with associated chest pain.  She is not on AV dony sinus dony blocking drug.  She referred for further evaluation and management.  Multiple different options discussed with the patient and the family.  Option #1 medical management option #2 given the symptomatic supraventricular tachycardia consider EP study ablation.  Pros and cons of both options discussed with the patient the family.  They opted for EP study and ablation depending on outcome.  Procedure risk explained.  Risks included  but not limited to infection, bleeding, stroke, pneumothorax, hematoma, heart block understand willing to proceed forward.  The patient preferred to have in the mid May or first week of June.  For short.  Will start Cardizem  mg and stop it 2-3 days prior to the procedure  Brittni was seen today for follow-up.    Diagnoses and all orders for this visit:    Type 2 diabetes mellitus without complication, without long-term current use of insulin (CMS/HCC)    Pure hypercholesterolemia    Essential hypertension    Gastroesophageal reflux disease without esophagitis    Paroxysmal SVT (supraventricular tachycardia) (CMS/[facility])    Palpitations        Mckay Jiménez MD  4/9/2019  4:15 PM

## 2019-04-10 DIAGNOSIS — R00.2 PALPITATIONS: ICD-10-CM

## 2019-04-10 DIAGNOSIS — I47.1 PAROXYSMAL SVT (SUPRAVENTRICULAR TACHYCARDIA) (HCC): Primary | ICD-10-CM

## 2019-04-10 DIAGNOSIS — R06.02 SOB (SHORTNESS OF BREATH): ICD-10-CM

## 2019-04-10 RX ORDER — SODIUM CHLORIDE 9 MG/ML
50 INJECTION, SOLUTION INTRAVENOUS CONTINUOUS
Status: CANCELLED | OUTPATIENT
Start: 2019-05-16

## 2019-04-26 ENCOUNTER — HOSPITAL ENCOUNTER (OUTPATIENT)
Facility: HOSPITAL | Age: 59
Setting detail: OBSERVATION
Discharge: HOME | End: 2019-04-27
Attending: FAMILY MEDICINE | Admitting: FAMILY MEDICINE

## 2019-04-26 ENCOUNTER — TELEPHONE (OUTPATIENT)
Dept: CARDIOLOGY | Facility: CLINIC | Age: 59
End: 2019-04-26

## 2019-04-26 ENCOUNTER — APPOINTMENT (OUTPATIENT)
Dept: GENERAL RADIOLOGY | Facility: HOSPITAL | Age: 59
End: 2019-04-26

## 2019-04-26 DIAGNOSIS — R07.9 CHEST PAIN, UNSPECIFIED TYPE: Primary | ICD-10-CM

## 2019-04-26 PROBLEM — J44.9 COPD (CHRONIC OBSTRUCTIVE PULMONARY DISEASE): Status: ACTIVE | Noted: 2019-04-26

## 2019-04-26 LAB
ALBUMIN SERPL-MCNC: 3.9 G/DL (ref 3.5–5.2)
ALBUMIN/GLOB SERPL: 1.4 G/DL
ALP SERPL-CCNC: 49 U/L (ref 39–117)
ALT SERPL W P-5'-P-CCNC: 14 U/L (ref 1–33)
ANION GAP SERPL CALCULATED.3IONS-SCNC: 12 MMOL/L
AST SERPL-CCNC: 17 U/L (ref 1–32)
BASOPHILS # BLD AUTO: 0.06 10*3/MM3 (ref 0–0.2)
BASOPHILS NFR BLD AUTO: 1 % (ref 0–1.5)
BILIRUB SERPL-MCNC: 0.3 MG/DL (ref 0.2–1.2)
BUN BLD-MCNC: 12 MG/DL (ref 6–20)
BUN/CREAT SERPL: 14.6 (ref 7–25)
CALCIUM SPEC-SCNC: 9.7 MG/DL (ref 8.6–10.5)
CHLORIDE SERPL-SCNC: 104 MMOL/L (ref 98–107)
CO2 SERPL-SCNC: 23 MMOL/L (ref 22–29)
CREAT BLD-MCNC: 0.82 MG/DL (ref 0.57–1)
DEPRECATED RDW RBC AUTO: 46.5 FL (ref 37–54)
EOSINOPHIL # BLD AUTO: 0.09 10*3/MM3 (ref 0–0.4)
EOSINOPHIL NFR BLD AUTO: 1.5 % (ref 0.3–6.2)
ERYTHROCYTE [DISTWIDTH] IN BLOOD BY AUTOMATED COUNT: 14.2 % (ref 12.3–15.4)
GFR SERPL CREATININE-BSD FRML MDRD: 72 ML/MIN/1.73
GLOBULIN UR ELPH-MCNC: 2.8 GM/DL
GLUCOSE BLD-MCNC: 98 MG/DL (ref 65–99)
GLUCOSE BLDC GLUCOMTR-MCNC: 80 MG/DL (ref 70–130)
HCT VFR BLD AUTO: 40.5 % (ref 34–46.6)
HGB BLD-MCNC: 12.9 G/DL (ref 12–15.9)
HOLD SPECIMEN: NORMAL
HOLD SPECIMEN: NORMAL
IMM GRANULOCYTES # BLD AUTO: 0.02 10*3/MM3 (ref 0–0.05)
IMM GRANULOCYTES NFR BLD AUTO: 0.3 % (ref 0–0.5)
LYMPHOCYTES # BLD AUTO: 1.97 10*3/MM3 (ref 0.7–3.1)
LYMPHOCYTES NFR BLD AUTO: 33.4 % (ref 19.6–45.3)
MCH RBC QN AUTO: 28.7 PG (ref 26.6–33)
MCHC RBC AUTO-ENTMCNC: 31.9 G/DL (ref 31.5–35.7)
MCV RBC AUTO: 90 FL (ref 79–97)
MONOCYTES # BLD AUTO: 0.56 10*3/MM3 (ref 0.1–0.9)
MONOCYTES NFR BLD AUTO: 9.5 % (ref 5–12)
NEUTROPHILS # BLD AUTO: 3.2 10*3/MM3 (ref 1.7–7)
NEUTROPHILS NFR BLD AUTO: 54.3 % (ref 42.7–76)
NRBC BLD AUTO-RTO: 0 /100 WBC (ref 0–0.2)
NT-PROBNP SERPL-MCNC: 129.8 PG/ML (ref 5–900)
PLATELET # BLD AUTO: 288 10*3/MM3 (ref 140–450)
PMV BLD AUTO: 10.8 FL (ref 6–12)
POTASSIUM BLD-SCNC: 3.8 MMOL/L (ref 3.5–5.2)
PROT SERPL-MCNC: 6.7 G/DL (ref 6–8.5)
RBC # BLD AUTO: 4.5 10*6/MM3 (ref 3.77–5.28)
SODIUM BLD-SCNC: 139 MMOL/L (ref 136–145)
TROPONIN T SERPL-MCNC: <0.01 NG/ML (ref 0–0.03)
WBC NRBC COR # BLD: 5.9 10*3/MM3 (ref 3.4–10.8)
WHOLE BLOOD HOLD SPECIMEN: NORMAL

## 2019-04-26 PROCEDURE — G0378 HOSPITAL OBSERVATION PER HR: HCPCS

## 2019-04-26 PROCEDURE — 80053 COMPREHEN METABOLIC PANEL: CPT | Performed by: FAMILY MEDICINE

## 2019-04-26 PROCEDURE — 25010000002 ONDANSETRON PER 1 MG: Performed by: PHYSICIAN ASSISTANT

## 2019-04-26 PROCEDURE — 96374 THER/PROPH/DIAG INJ IV PUSH: CPT

## 2019-04-26 PROCEDURE — 93005 ELECTROCARDIOGRAM TRACING: CPT | Performed by: FAMILY MEDICINE

## 2019-04-26 PROCEDURE — 36415 COLL VENOUS BLD VENIPUNCTURE: CPT | Performed by: FAMILY MEDICINE

## 2019-04-26 PROCEDURE — 25010000002 MORPHINE PER 10 MG: Performed by: FAMILY MEDICINE

## 2019-04-26 PROCEDURE — 93005 ELECTROCARDIOGRAM TRACING: CPT

## 2019-04-26 PROCEDURE — 71045 X-RAY EXAM CHEST 1 VIEW: CPT

## 2019-04-26 PROCEDURE — 94799 UNLISTED PULMONARY SVC/PX: CPT

## 2019-04-26 PROCEDURE — 99285 EMERGENCY DEPT VISIT HI MDM: CPT

## 2019-04-26 PROCEDURE — 96375 TX/PRO/DX INJ NEW DRUG ADDON: CPT

## 2019-04-26 PROCEDURE — 84484 ASSAY OF TROPONIN QUANT: CPT | Performed by: STUDENT IN AN ORGANIZED HEALTH CARE EDUCATION/TRAINING PROGRAM

## 2019-04-26 PROCEDURE — 84484 ASSAY OF TROPONIN QUANT: CPT | Performed by: FAMILY MEDICINE

## 2019-04-26 PROCEDURE — 94760 N-INVAS EAR/PLS OXIMETRY 1: CPT

## 2019-04-26 PROCEDURE — 85025 COMPLETE CBC W/AUTO DIFF WBC: CPT | Performed by: FAMILY MEDICINE

## 2019-04-26 PROCEDURE — 93010 ELECTROCARDIOGRAM REPORT: CPT | Performed by: INTERNAL MEDICINE

## 2019-04-26 PROCEDURE — 94640 AIRWAY INHALATION TREATMENT: CPT

## 2019-04-26 PROCEDURE — 99220 PR INITIAL OBSERVATION CARE/DAY 70 MINUTES: CPT | Performed by: STUDENT IN AN ORGANIZED HEALTH CARE EDUCATION/TRAINING PROGRAM

## 2019-04-26 PROCEDURE — 83880 ASSAY OF NATRIURETIC PEPTIDE: CPT | Performed by: FAMILY MEDICINE

## 2019-04-26 PROCEDURE — 82962 GLUCOSE BLOOD TEST: CPT

## 2019-04-26 RX ORDER — MORPHINE SULFATE 2 MG/ML
2 INJECTION, SOLUTION INTRAMUSCULAR; INTRAVENOUS ONCE
Status: COMPLETED | OUTPATIENT
Start: 2019-04-26 | End: 2019-04-26

## 2019-04-26 RX ORDER — SODIUM CHLORIDE 0.9 % (FLUSH) 0.9 %
3 SYRINGE (ML) INJECTION EVERY 12 HOURS SCHEDULED
Status: DISCONTINUED | OUTPATIENT
Start: 2019-04-26 | End: 2019-04-27 | Stop reason: HOSPADM

## 2019-04-26 RX ORDER — GABAPENTIN 400 MG/1
800 CAPSULE ORAL 3 TIMES DAILY
Status: DISCONTINUED | OUTPATIENT
Start: 2019-04-26 | End: 2019-04-26

## 2019-04-26 RX ORDER — GABAPENTIN 800 MG/1
800 TABLET ORAL 3 TIMES DAILY
Status: DISCONTINUED | OUTPATIENT
Start: 2019-04-26 | End: 2019-04-27 | Stop reason: HOSPADM

## 2019-04-26 RX ORDER — SODIUM CHLORIDE 0.9 % (FLUSH) 0.9 %
10 SYRINGE (ML) INJECTION AS NEEDED
Status: DISCONTINUED | OUTPATIENT
Start: 2019-04-26 | End: 2019-04-27 | Stop reason: HOSPADM

## 2019-04-26 RX ORDER — MORPHINE SULFATE 2 MG/ML
1 INJECTION, SOLUTION INTRAMUSCULAR; INTRAVENOUS EVERY 4 HOURS PRN
Status: DISCONTINUED | OUTPATIENT
Start: 2019-04-26 | End: 2019-04-27 | Stop reason: HOSPADM

## 2019-04-26 RX ORDER — ALUMINA, MAGNESIA, AND SIMETHICONE 2400; 2400; 240 MG/30ML; MG/30ML; MG/30ML
15 SUSPENSION ORAL ONCE
Status: COMPLETED | OUTPATIENT
Start: 2019-04-26 | End: 2019-04-26

## 2019-04-26 RX ORDER — ALBUTEROL SULFATE 2.5 MG/3ML
2.5 SOLUTION RESPIRATORY (INHALATION) EVERY 6 HOURS
Status: DISCONTINUED | OUTPATIENT
Start: 2019-04-26 | End: 2019-04-27 | Stop reason: HOSPADM

## 2019-04-26 RX ORDER — SODIUM CHLORIDE 0.9 % (FLUSH) 0.9 %
3-10 SYRINGE (ML) INJECTION AS NEEDED
Status: DISCONTINUED | OUTPATIENT
Start: 2019-04-26 | End: 2019-04-27 | Stop reason: HOSPADM

## 2019-04-26 RX ORDER — ONDANSETRON 2 MG/ML
4 INJECTION INTRAMUSCULAR; INTRAVENOUS ONCE
Status: COMPLETED | OUTPATIENT
Start: 2019-04-26 | End: 2019-04-26

## 2019-04-26 RX ORDER — ATORVASTATIN CALCIUM 20 MG/1
20 TABLET, FILM COATED ORAL NIGHTLY
Status: DISCONTINUED | OUTPATIENT
Start: 2019-04-26 | End: 2019-04-27 | Stop reason: HOSPADM

## 2019-04-26 RX ORDER — SODIUM CHLORIDE 9 MG/ML
100 INJECTION, SOLUTION INTRAVENOUS CONTINUOUS
Status: DISCONTINUED | OUTPATIENT
Start: 2019-04-26 | End: 2019-04-27 | Stop reason: HOSPADM

## 2019-04-26 RX ORDER — IBUPROFEN 600 MG/1
600 TABLET ORAL EVERY 8 HOURS PRN
Status: DISCONTINUED | OUTPATIENT
Start: 2019-04-26 | End: 2019-04-27 | Stop reason: HOSPADM

## 2019-04-26 RX ORDER — FAMOTIDINE 40 MG/1
40 TABLET, FILM COATED ORAL DAILY
Status: DISCONTINUED | OUTPATIENT
Start: 2019-04-27 | End: 2019-04-27 | Stop reason: HOSPADM

## 2019-04-26 RX ORDER — ASPIRIN 325 MG
325 TABLET ORAL ONCE
Status: COMPLETED | OUTPATIENT
Start: 2019-04-26 | End: 2019-04-26

## 2019-04-26 RX ORDER — NALOXONE HCL 0.4 MG/ML
0.4 VIAL (ML) INJECTION
Status: DISCONTINUED | OUTPATIENT
Start: 2019-04-26 | End: 2019-04-27 | Stop reason: HOSPADM

## 2019-04-26 RX ORDER — CETIRIZINE HYDROCHLORIDE 10 MG/1
10 TABLET ORAL DAILY PRN
Status: DISCONTINUED | OUTPATIENT
Start: 2019-04-26 | End: 2019-04-27 | Stop reason: HOSPADM

## 2019-04-26 RX ORDER — NITROGLYCERIN 0.4 MG/1
0.4 TABLET SUBLINGUAL ONCE
Status: COMPLETED | OUTPATIENT
Start: 2019-04-26 | End: 2019-04-26

## 2019-04-26 RX ORDER — LOSARTAN POTASSIUM 25 MG/1
12.5 TABLET ORAL DAILY
Status: DISCONTINUED | OUTPATIENT
Start: 2019-04-27 | End: 2019-04-27 | Stop reason: HOSPADM

## 2019-04-26 RX ORDER — BUDESONIDE 0.5 MG/2ML
0.5 INHALANT ORAL
Status: DISCONTINUED | OUTPATIENT
Start: 2019-04-26 | End: 2019-04-27 | Stop reason: HOSPADM

## 2019-04-26 RX ADMIN — DILTIAZEM HYDROCHLORIDE 30 MG: 30 TABLET, FILM COATED ORAL at 20:50

## 2019-04-26 RX ADMIN — LEVETIRACETAM 750 MG: 500 TABLET ORAL at 20:49

## 2019-04-26 RX ADMIN — ALUMINUM HYDROXIDE, MAGNESIUM HYDROXIDE, AND DIMETHICONE 15 ML: 400; 400; 40 SUSPENSION ORAL at 11:48

## 2019-04-26 RX ADMIN — IBUPROFEN 600 MG: 600 TABLET ORAL at 20:53

## 2019-04-26 RX ADMIN — MORPHINE SULFATE 4 MG: 4 INJECTION INTRAVENOUS at 11:48

## 2019-04-26 RX ADMIN — METFORMIN HYDROCHLORIDE 500 MG: 500 TABLET, FILM COATED ORAL at 17:47

## 2019-04-26 RX ADMIN — NITROGLYCERIN 0.4 MG: 0.4 TABLET, ORALLY DISINTEGRATING SUBLINGUAL at 10:57

## 2019-04-26 RX ADMIN — ONDANSETRON 4 MG: 2 INJECTION INTRAMUSCULAR; INTRAVENOUS at 11:42

## 2019-04-26 RX ADMIN — SODIUM CHLORIDE 100 ML/HR: 9 INJECTION, SOLUTION INTRAVENOUS at 17:45

## 2019-04-26 RX ADMIN — ALBUTEROL SULFATE 2.5 MG: 2.5 SOLUTION RESPIRATORY (INHALATION) at 18:47

## 2019-04-26 RX ADMIN — ATORVASTATIN CALCIUM 20 MG: 20 TABLET, FILM COATED ORAL at 20:49

## 2019-04-26 RX ADMIN — ASPIRIN 325 MG: 325 TABLET, COATED ORAL at 10:15

## 2019-04-26 RX ADMIN — GABAPENTIN 800 MG: 800 TABLET ORAL at 18:09

## 2019-04-26 RX ADMIN — LIDOCAINE HYDROCHLORIDE 15 ML: 20 SOLUTION ORAL; TOPICAL at 11:48

## 2019-04-26 RX ADMIN — MORPHINE SULFATE 2 MG: 2 INJECTION, SOLUTION INTRAMUSCULAR; INTRAVENOUS at 15:30

## 2019-04-26 RX ADMIN — BUDESONIDE 0.5 MG: 0.5 INHALANT RESPIRATORY (INHALATION) at 18:52

## 2019-04-26 NOTE — TELEPHONE ENCOUNTER
Patient called stating she is at work and having chest pain. I advised her to go to the Emergency room.

## 2019-04-27 VITALS
HEART RATE: 81 BPM | BODY MASS INDEX: 25.71 KG/M2 | OXYGEN SATURATION: 94 % | DIASTOLIC BLOOD PRESSURE: 82 MMHG | RESPIRATION RATE: 18 BRPM | HEIGHT: 67 IN | WEIGHT: 163.8 LBS | SYSTOLIC BLOOD PRESSURE: 135 MMHG | TEMPERATURE: 98.8 F

## 2019-04-27 PROBLEM — R07.9 CHEST PAIN: Status: RESOLVED | Noted: 2019-04-26 | Resolved: 2019-04-27

## 2019-04-27 LAB
ANION GAP SERPL CALCULATED.3IONS-SCNC: 10 MMOL/L
BASOPHILS # BLD AUTO: 0.03 10*3/MM3 (ref 0–0.2)
BASOPHILS NFR BLD AUTO: 0.7 % (ref 0–1.5)
BUN BLD-MCNC: 16 MG/DL (ref 6–20)
BUN/CREAT SERPL: 18.8 (ref 7–25)
CALCIUM SPEC-SCNC: 8.6 MG/DL (ref 8.6–10.5)
CHLORIDE SERPL-SCNC: 107 MMOL/L (ref 98–107)
CO2 SERPL-SCNC: 26 MMOL/L (ref 22–29)
CREAT BLD-MCNC: 0.85 MG/DL (ref 0.57–1)
DEPRECATED RDW RBC AUTO: 47.8 FL (ref 37–54)
EOSINOPHIL # BLD AUTO: 0.12 10*3/MM3 (ref 0–0.4)
EOSINOPHIL NFR BLD AUTO: 2.6 % (ref 0.3–6.2)
ERYTHROCYTE [DISTWIDTH] IN BLOOD BY AUTOMATED COUNT: 14.2 % (ref 12.3–15.4)
GFR SERPL CREATININE-BSD FRML MDRD: 69 ML/MIN/1.73
GLUCOSE BLD-MCNC: 96 MG/DL (ref 65–99)
HCT VFR BLD AUTO: 34.9 % (ref 34–46.6)
HGB BLD-MCNC: 10.9 G/DL (ref 12–15.9)
IMM GRANULOCYTES # BLD AUTO: 0 10*3/MM3 (ref 0–0.05)
IMM GRANULOCYTES NFR BLD AUTO: 0 % (ref 0–0.5)
LYMPHOCYTES # BLD AUTO: 1.94 10*3/MM3 (ref 0.7–3.1)
LYMPHOCYTES NFR BLD AUTO: 42.3 % (ref 19.6–45.3)
MCH RBC QN AUTO: 28.7 PG (ref 26.6–33)
MCHC RBC AUTO-ENTMCNC: 31.2 G/DL (ref 31.5–35.7)
MCV RBC AUTO: 91.8 FL (ref 79–97)
MONOCYTES # BLD AUTO: 0.42 10*3/MM3 (ref 0.1–0.9)
MONOCYTES NFR BLD AUTO: 9.2 % (ref 5–12)
NEUTROPHILS # BLD AUTO: 2.08 10*3/MM3 (ref 1.7–7)
NEUTROPHILS NFR BLD AUTO: 45.2 % (ref 42.7–76)
NRBC BLD AUTO-RTO: 0 /100 WBC (ref 0–0.2)
PLATELET # BLD AUTO: 228 10*3/MM3 (ref 140–450)
PMV BLD AUTO: 10.9 FL (ref 6–12)
POTASSIUM BLD-SCNC: 4.2 MMOL/L (ref 3.5–5.2)
RBC # BLD AUTO: 3.8 10*6/MM3 (ref 3.77–5.28)
SODIUM BLD-SCNC: 143 MMOL/L (ref 136–145)
WBC NRBC COR # BLD: 4.59 10*3/MM3 (ref 3.4–10.8)

## 2019-04-27 PROCEDURE — 94760 N-INVAS EAR/PLS OXIMETRY 1: CPT

## 2019-04-27 PROCEDURE — 99214 OFFICE O/P EST MOD 30 MIN: CPT | Performed by: INTERNAL MEDICINE

## 2019-04-27 PROCEDURE — 94799 UNLISTED PULMONARY SVC/PX: CPT

## 2019-04-27 PROCEDURE — 80048 BASIC METABOLIC PNL TOTAL CA: CPT | Performed by: FAMILY MEDICINE

## 2019-04-27 PROCEDURE — G0378 HOSPITAL OBSERVATION PER HR: HCPCS

## 2019-04-27 PROCEDURE — 99217 PR OBSERVATION CARE DISCHARGE MANAGEMENT: CPT | Performed by: STUDENT IN AN ORGANIZED HEALTH CARE EDUCATION/TRAINING PROGRAM

## 2019-04-27 PROCEDURE — 85025 COMPLETE CBC W/AUTO DIFF WBC: CPT | Performed by: STUDENT IN AN ORGANIZED HEALTH CARE EDUCATION/TRAINING PROGRAM

## 2019-04-27 RX ADMIN — ALBUTEROL SULFATE 2.5 MG: 2.5 SOLUTION RESPIRATORY (INHALATION) at 06:23

## 2019-04-27 RX ADMIN — SODIUM CHLORIDE, PRESERVATIVE FREE 3 ML: 5 INJECTION INTRAVENOUS at 08:50

## 2019-04-27 RX ADMIN — DILTIAZEM HYDROCHLORIDE 30 MG: 30 TABLET, FILM COATED ORAL at 08:50

## 2019-04-27 RX ADMIN — BUDESONIDE 0.5 MG: 0.5 INHALANT RESPIRATORY (INHALATION) at 06:23

## 2019-04-27 RX ADMIN — GABAPENTIN 800 MG: 800 TABLET ORAL at 08:49

## 2019-04-27 RX ADMIN — FAMOTIDINE 40 MG: 40 TABLET ORAL at 08:50

## 2019-04-27 RX ADMIN — Medication 12.5 MG: at 08:50

## 2019-04-27 RX ADMIN — ALBUTEROL SULFATE 2.5 MG: 2.5 SOLUTION RESPIRATORY (INHALATION) at 00:23

## 2019-04-27 RX ADMIN — METFORMIN HYDROCHLORIDE 500 MG: 500 TABLET, FILM COATED ORAL at 08:50

## 2019-04-27 RX ADMIN — LEVETIRACETAM 750 MG: 500 TABLET ORAL at 08:50

## 2019-05-08 ENCOUNTER — TELEPHONE (OUTPATIENT)
Dept: CARDIOLOGY | Facility: CLINIC | Age: 59
End: 2019-05-08

## 2019-05-08 NOTE — TELEPHONE ENCOUNTER
Tried to return patient phone call. I got a message that she had some questions regarding her upcoming procedure. No voicemail available. Unable to leave message.

## 2019-05-10 ENCOUNTER — TELEPHONE (OUTPATIENT)
Dept: CARDIOLOGY | Facility: CLINIC | Age: 59
End: 2019-05-10

## 2019-05-10 NOTE — TELEPHONE ENCOUNTER
Instructions for ablation discussed with patient. Patient verbalized understanding of all instructions given and advised to call office if questions arise.

## 2019-05-15 ENCOUNTER — ANESTHESIA EVENT (OUTPATIENT)
Dept: CARDIOLOGY | Facility: HOSPITAL | Age: 59
End: 2019-05-15

## 2019-05-16 ENCOUNTER — HOSPITAL ENCOUNTER (OUTPATIENT)
Facility: HOSPITAL | Age: 59
Discharge: HOME | End: 2019-05-17
Attending: INTERNAL MEDICINE | Admitting: INTERNAL MEDICINE

## 2019-05-16 ENCOUNTER — ANESTHESIA (OUTPATIENT)
Dept: CARDIOLOGY | Facility: HOSPITAL | Age: 59
End: 2019-05-16

## 2019-05-16 DIAGNOSIS — R00.2 PALPITATIONS: ICD-10-CM

## 2019-05-16 DIAGNOSIS — R06.02 SOB (SHORTNESS OF BREATH): ICD-10-CM

## 2019-05-16 DIAGNOSIS — I47.1 PAROXYSMAL SVT (SUPRAVENTRICULAR TACHYCARDIA) (HCC): ICD-10-CM

## 2019-05-16 LAB
ANION GAP SERPL CALCULATED.3IONS-SCNC: 13 MMOL/L
BUN BLD-MCNC: 8 MG/DL (ref 6–20)
BUN/CREAT SERPL: 9.3 (ref 7–25)
CALCIUM SPEC-SCNC: 9.3 MG/DL (ref 8.6–10.5)
CHLORIDE SERPL-SCNC: 106 MMOL/L (ref 98–107)
CO2 SERPL-SCNC: 24 MMOL/L (ref 22–29)
CREAT BLD-MCNC: 0.86 MG/DL (ref 0.57–1)
DEPRECATED RDW RBC AUTO: 47 FL (ref 37–54)
ERYTHROCYTE [DISTWIDTH] IN BLOOD BY AUTOMATED COUNT: 14 % (ref 12.3–15.4)
GFR SERPL CREATININE-BSD FRML MDRD: 68 ML/MIN/1.73
GLUCOSE BLD-MCNC: 87 MG/DL (ref 65–99)
GLUCOSE BLDC GLUCOMTR-MCNC: 96 MG/DL (ref 70–130)
GLUCOSE BLDC GLUCOMTR-MCNC: 97 MG/DL (ref 70–130)
HCT VFR BLD AUTO: 40.3 % (ref 34–46.6)
HGB BLD-MCNC: 12.7 G/DL (ref 12–15.9)
INR PPP: 0.83 (ref 0.8–1.2)
MCH RBC QN AUTO: 28.7 PG (ref 26.6–33)
MCHC RBC AUTO-ENTMCNC: 31.5 G/DL (ref 31.5–35.7)
MCV RBC AUTO: 91 FL (ref 79–97)
PLATELET # BLD AUTO: 196 10*3/MM3 (ref 140–450)
PMV BLD AUTO: 11.3 FL (ref 6–12)
POTASSIUM BLD-SCNC: 4 MMOL/L (ref 3.5–5.2)
PROTHROMBIN TIME: 11.3 SECONDS (ref 11.1–15.3)
RBC # BLD AUTO: 4.43 10*6/MM3 (ref 3.77–5.28)
SODIUM BLD-SCNC: 143 MMOL/L (ref 136–145)
WBC NRBC COR # BLD: 5.22 10*3/MM3 (ref 3.4–10.8)

## 2019-05-16 PROCEDURE — 82962 GLUCOSE BLOOD TEST: CPT

## 2019-05-16 PROCEDURE — 93613 INTRACARDIAC EPHYS 3D MAPG: CPT | Performed by: INTERNAL MEDICINE

## 2019-05-16 PROCEDURE — C1894 INTRO/SHEATH, NON-LASER: HCPCS | Performed by: INTERNAL MEDICINE

## 2019-05-16 PROCEDURE — 85027 COMPLETE CBC AUTOMATED: CPT | Performed by: INTERNAL MEDICINE

## 2019-05-16 PROCEDURE — 25010000002 PROPOFOL 10 MG/ML EMULSION: Performed by: NURSE ANESTHETIST, CERTIFIED REGISTERED

## 2019-05-16 PROCEDURE — 93623 PRGRMD STIMJ&PACG IV RX NFS: CPT | Performed by: INTERNAL MEDICINE

## 2019-05-16 PROCEDURE — 25010000002 MIDAZOLAM PER 1 MG: Performed by: NURSE ANESTHETIST, CERTIFIED REGISTERED

## 2019-05-16 PROCEDURE — 85610 PROTHROMBIN TIME: CPT | Performed by: INTERNAL MEDICINE

## 2019-05-16 PROCEDURE — 80048 BASIC METABOLIC PNL TOTAL CA: CPT | Performed by: INTERNAL MEDICINE

## 2019-05-16 PROCEDURE — 93621 COMP EP EVL L PAC&REC C SINS: CPT | Performed by: INTERNAL MEDICINE

## 2019-05-16 PROCEDURE — 25010000002 BIVALIRUDIN TRIFLUOROACETATE 250 MG RECONSTITUTED SOLUTION 1 EACH VIAL: Performed by: INTERNAL MEDICINE

## 2019-05-16 PROCEDURE — C1730 CATH, EP, 19 OR FEW ELECT: HCPCS | Performed by: INTERNAL MEDICINE

## 2019-05-16 PROCEDURE — 25010000002 ATROPINE PER 0.01 MG: Performed by: INTERNAL MEDICINE

## 2019-05-16 PROCEDURE — 93653 COMPRE EP EVAL TX SVT: CPT | Performed by: INTERNAL MEDICINE

## 2019-05-16 PROCEDURE — C1760 CLOSURE DEV, VASC: HCPCS | Performed by: INTERNAL MEDICINE

## 2019-05-16 PROCEDURE — C1733 CATH, EP, OTHR THAN COOL-TIP: HCPCS | Performed by: INTERNAL MEDICINE

## 2019-05-16 PROCEDURE — 25010000003 EPINEPHRINE 30 MG/30ML SOLUTION 30 ML VIAL: Performed by: INTERNAL MEDICINE

## 2019-05-16 PROCEDURE — 25010000002 DEXAMETHASONE PER 1 MG: Performed by: NURSE ANESTHETIST, CERTIFIED REGISTERED

## 2019-05-16 PROCEDURE — 25010000002 ONDANSETRON PER 1 MG: Performed by: NURSE ANESTHETIST, CERTIFIED REGISTERED

## 2019-05-16 RX ORDER — MIDAZOLAM HYDROCHLORIDE 1 MG/ML
INJECTION INTRAMUSCULAR; INTRAVENOUS AS NEEDED
Status: DISCONTINUED | OUTPATIENT
Start: 2019-05-16 | End: 2019-05-16 | Stop reason: SURG

## 2019-05-16 RX ORDER — PROPOFOL 10 MG/ML
VIAL (ML) INTRAVENOUS AS NEEDED
Status: DISCONTINUED | OUTPATIENT
Start: 2019-05-16 | End: 2019-05-16 | Stop reason: SURG

## 2019-05-16 RX ORDER — DEXAMETHASONE SODIUM PHOSPHATE 4 MG/ML
8 INJECTION, SOLUTION INTRA-ARTICULAR; INTRALESIONAL; INTRAMUSCULAR; INTRAVENOUS; SOFT TISSUE ONCE AS NEEDED
Status: DISCONTINUED | OUTPATIENT
Start: 2019-05-16 | End: 2019-05-16

## 2019-05-16 RX ORDER — ONDANSETRON 2 MG/ML
4 INJECTION INTRAMUSCULAR; INTRAVENOUS ONCE AS NEEDED
Status: DISCONTINUED | OUTPATIENT
Start: 2019-05-16 | End: 2019-05-16 | Stop reason: HOSPADM

## 2019-05-16 RX ORDER — SODIUM CHLORIDE 9 MG/ML
1000 INJECTION, SOLUTION INTRAVENOUS CONTINUOUS
Status: DISCONTINUED | OUTPATIENT
Start: 2019-05-16 | End: 2019-05-17 | Stop reason: HOSPADM

## 2019-05-16 RX ORDER — FLECAINIDE ACETATE 50 MG/1
50 TABLET ORAL EVERY 12 HOURS SCHEDULED
Status: DISCONTINUED | OUTPATIENT
Start: 2019-05-16 | End: 2019-05-17 | Stop reason: HOSPADM

## 2019-05-16 RX ORDER — DEXAMETHASONE SODIUM PHOSPHATE 4 MG/ML
INJECTION, SOLUTION INTRA-ARTICULAR; INTRALESIONAL; INTRAMUSCULAR; INTRAVENOUS; SOFT TISSUE AS NEEDED
Status: DISCONTINUED | OUTPATIENT
Start: 2019-05-16 | End: 2019-05-16 | Stop reason: SURG

## 2019-05-16 RX ORDER — ONDANSETRON 2 MG/ML
INJECTION INTRAMUSCULAR; INTRAVENOUS AS NEEDED
Status: DISCONTINUED | OUTPATIENT
Start: 2019-05-16 | End: 2019-05-16 | Stop reason: SURG

## 2019-05-16 RX ORDER — IBUPROFEN 600 MG/1
600 TABLET ORAL EVERY 8 HOURS PRN
Status: DISCONTINUED | OUTPATIENT
Start: 2019-05-16 | End: 2019-05-17 | Stop reason: HOSPADM

## 2019-05-16 RX ORDER — ROCURONIUM BROMIDE 10 MG/ML
INJECTION, SOLUTION INTRAVENOUS AS NEEDED
Status: DISCONTINUED | OUTPATIENT
Start: 2019-05-16 | End: 2019-05-16 | Stop reason: SURG

## 2019-05-16 RX ORDER — LOSARTAN POTASSIUM 25 MG/1
12.5 TABLET ORAL DAILY
Status: DISCONTINUED | OUTPATIENT
Start: 2019-05-16 | End: 2019-05-17 | Stop reason: HOSPADM

## 2019-05-16 RX ORDER — LIDOCAINE HYDROCHLORIDE 20 MG/ML
INJECTION, SOLUTION INFILTRATION; PERINEURAL AS NEEDED
Status: DISCONTINUED | OUTPATIENT
Start: 2019-05-16 | End: 2019-05-16 | Stop reason: SURG

## 2019-05-16 RX ORDER — KETAMINE HYDROCHLORIDE 100 MG/ML
INJECTION INTRAMUSCULAR; INTRAVENOUS AS NEEDED
Status: DISCONTINUED | OUTPATIENT
Start: 2019-05-16 | End: 2019-05-16 | Stop reason: SURG

## 2019-05-16 RX ORDER — ATROPINE SULFATE 1 MG/ML
INJECTION, SOLUTION INTRAMUSCULAR; INTRAVENOUS; SUBCUTANEOUS AS NEEDED
Status: DISCONTINUED | OUTPATIENT
Start: 2019-05-16 | End: 2019-05-16 | Stop reason: HOSPADM

## 2019-05-16 RX ORDER — ATORVASTATIN CALCIUM 20 MG/1
20 TABLET, FILM COATED ORAL NIGHTLY
Status: DISCONTINUED | OUTPATIENT
Start: 2019-05-16 | End: 2019-05-17 | Stop reason: HOSPADM

## 2019-05-16 RX ORDER — SODIUM CHLORIDE 9 MG/ML
50 INJECTION, SOLUTION INTRAVENOUS CONTINUOUS
Status: DISCONTINUED | OUTPATIENT
Start: 2019-05-16 | End: 2019-05-17 | Stop reason: HOSPADM

## 2019-05-16 RX ORDER — CETIRIZINE HYDROCHLORIDE 10 MG/1
10 TABLET ORAL DAILY
Status: DISCONTINUED | OUTPATIENT
Start: 2019-05-16 | End: 2019-05-17 | Stop reason: HOSPADM

## 2019-05-16 RX ORDER — FAMOTIDINE 20 MG/1
20 TABLET, FILM COATED ORAL DAILY
Status: DISCONTINUED | OUTPATIENT
Start: 2019-05-16 | End: 2019-05-17 | Stop reason: HOSPADM

## 2019-05-16 RX ORDER — ONDANSETRON 2 MG/ML
4 INJECTION INTRAMUSCULAR; INTRAVENOUS EVERY 6 HOURS PRN
Status: DISCONTINUED | OUTPATIENT
Start: 2019-05-16 | End: 2019-05-17 | Stop reason: HOSPADM

## 2019-05-16 RX ADMIN — DILTIAZEM HYDROCHLORIDE 30 MG: 30 TABLET, FILM COATED ORAL at 21:08

## 2019-05-16 RX ADMIN — LIDOCAINE HYDROCHLORIDE 100 MG: 20 INJECTION, SOLUTION INFILTRATION; PERINEURAL at 11:10

## 2019-05-16 RX ADMIN — LEVETIRACETAM 750 MG: 500 TABLET ORAL at 21:08

## 2019-05-16 RX ADMIN — APIXABAN 2.5 MG: 2.5 TABLET, FILM COATED ORAL at 21:08

## 2019-05-16 RX ADMIN — PROPOFOL 50 MG: 10 INJECTION, EMULSION INTRAVENOUS at 11:16

## 2019-05-16 RX ADMIN — KETAMINE HYDROCHLORIDE 40 MG: 100 INJECTION INTRAMUSCULAR; INTRAVENOUS at 11:10

## 2019-05-16 RX ADMIN — ROCURONIUM BROMIDE 10 MG: 10 INJECTION INTRAVENOUS at 12:15

## 2019-05-16 RX ADMIN — ROCURONIUM BROMIDE 5 MG: 10 INJECTION INTRAVENOUS at 11:10

## 2019-05-16 RX ADMIN — SODIUM CHLORIDE 1000 ML: 9 INJECTION, SOLUTION INTRAVENOUS at 08:59

## 2019-05-16 RX ADMIN — PROPOFOL 100 MG: 10 INJECTION, EMULSION INTRAVENOUS at 11:10

## 2019-05-16 RX ADMIN — IBUPROFEN 600 MG: 600 TABLET ORAL at 16:36

## 2019-05-16 RX ADMIN — ROCURONIUM BROMIDE 30 MG: 10 INJECTION INTRAVENOUS at 11:11

## 2019-05-16 RX ADMIN — SODIUM CHLORIDE 50 ML/HR: 9 INJECTION, SOLUTION INTRAVENOUS at 15:32

## 2019-05-16 RX ADMIN — EPINEPHRINE 0.05 MCG/KG/MIN: 1 INJECTION PARENTERAL at 12:13

## 2019-05-16 RX ADMIN — MIDAZOLAM HYDROCHLORIDE 2 MG: 2 INJECTION, SOLUTION INTRAMUSCULAR; INTRAVENOUS at 11:00

## 2019-05-16 RX ADMIN — FLECAINIDE ACETATE 50 MG: 50 TABLET ORAL at 21:08

## 2019-05-16 RX ADMIN — PROPOFOL 30 MG: 10 INJECTION, EMULSION INTRAVENOUS at 13:12

## 2019-05-16 RX ADMIN — ATORVASTATIN CALCIUM 20 MG: 20 TABLET, FILM COATED ORAL at 21:08

## 2019-05-16 RX ADMIN — ONDANSETRON 4 MG: 2 INJECTION INTRAMUSCULAR; INTRAVENOUS at 13:05

## 2019-05-16 RX ADMIN — DEXAMETHASONE SODIUM PHOSPHATE 4 MG: 4 INJECTION, SOLUTION INTRAMUSCULAR; INTRAVENOUS at 11:42

## 2019-05-16 RX ADMIN — METFORMIN HYDROCHLORIDE 500 MG: 500 TABLET, FILM COATED ORAL at 17:25

## 2019-05-16 RX ADMIN — MEPERIDINE HYDROCHLORIDE 12.5 MG: 25 INJECTION, SOLUTION INTRAMUSCULAR; INTRAVENOUS; SUBCUTANEOUS at 14:05

## 2019-05-16 RX ADMIN — DILTIAZEM HYDROCHLORIDE 30 MG: 30 TABLET, FILM COATED ORAL at 16:36

## 2019-05-16 RX ADMIN — Medication 12.5 MG: at 21:09

## 2019-05-16 RX ADMIN — PROPOFOL 50 MG: 10 INJECTION, EMULSION INTRAVENOUS at 13:11

## 2019-05-16 RX ADMIN — PROPOFOL 50 MG: 10 INJECTION, EMULSION INTRAVENOUS at 11:22

## 2019-05-16 NOTE — H&P
Cardiology at [facility] History and Physical Note      Brittni Hagen  Pool/NONE  [id number]  1960    DATE OF ADMISSION: 5/16/2019    Reason for Hospitalization: History of supraventricular tachycardia evaluated in the office multiple different options discussed opted for EP study and ablation depends on outcome    History of Present Illness:      Patient's Body mass index is 25.14 kg/m². BMI is within normal parameters. No follow-up required.     .58 old lady who recently evaluated by Dr. Hammond for chest pain underwent stress test low risk study with good heart function and developed supraventricular tachycardia at a rate of 180 bpm during stress test with a history of palpitation and associated chest pain/dizziness of more than 2 years duration with sudden onset and termination and referred for possible EP study and ablations and background history of diabetes, epilepsy, hyperlipidemia, hypothyroidism, hypertension has been a diabetic for about 12 years has been having right-sided chest pain radiating to right arm with shortness of breath.  She has history of headache with aura and almost a partial syncope.  She has history of 2 seizures and is being followed by Dr. Recinos in [place].          STRESS TEST 4/3/19  · Patient developed supraventricular tachycardia with diffuse nonspecific ST-T changes with a heart rate of up to 181 bpm during recovery which lasted for a couple of minutes after which the patient returned to sinus rhythm.  · Baseline EKG showed sinus rhythm with incomplete right bundle branch block.  · Left ventricular ejection fraction is hyperdynamic (Calculated EF > 70%).  · Myocardial perfusion imaging indicates a normal myocardial perfusion study with no evidence of ischemia.  · Impressions are consistent with a low risk study.      Allergies   Allergen Reactions   • Beef-Derived Products Hives     Red meats, Alpha-gal Syndrome    • Amoxicillin Hives   •  "Grass Hives   • Green Soap Tincture Itching   • Promethazine      Gave her the shakes   • Red Dye Itching       Prior to Admission medications    Medication Sig Start Date End Date Taking? Authorizing Provider   atorvastatin (LIPITOR) 20 MG tablet Take 20 mg by mouth Every Night.   Yes Ten Dockery MD   beclomethasone (QVAR) 40 MCG/ACT inhaler Inhale 2 puffs Every 4 (Four) Hours As Needed. 6/22/15  Yes Tne Dockery MD   gabapentin (NEURONTIN) 800 MG tablet Take 800 mg by mouth 3 (Three) Times a Day.   Yes Ten Dockery MD   ibuprofen (ADVIL,MOTRIN) 600 MG tablet Take 600 mg by mouth Every 8 (Eight) Hours As Needed for Mild Pain .   Yes Ten Dockery MD   levETIRAcetam (KEPPRA) 750 MG tablet Take 750 mg by mouth 2 (Two) Times a Day.   Yes Ten Dockery MD   losartan (COZAAR) 25 MG tablet Take 12.5 mg by mouth Daily. 5/5/15  Yes Ten Dockery MD   metFORMIN (GLUCOPHAGE) 500 MG tablet Take 500 mg by mouth 2 (Two) Times a Day With Meals. 4/26/13  Yes Ten Dockery MD   raNITIdine (ZANTAC) 150 MG tablet Take 300 mg by mouth Every Night.   Yes Ten Dockery MD   albuterol (PROVENTIL HFA;VENTOLIN HFA) 108 (90 BASE) MCG/ACT inhaler Inhale 2 puffs Every 6 (Six) Hours.    Ten Dockery MD   diltiaZEM (CARDIZEM) 30 MG tablet Take 1 tablet by mouth 3 (Three) Times a Day. 4/10/19   Mckay Jiménez MD   EPINEPHrine (EPIPEN JR) 0.15 MG/0.3ML solution auto-injector injection Inject 0.15 mg into the shoulder, thigh, or buttocks As Needed.    Ten Dockery MD   glucose blood test strip  6/23/15   Ten Dockery MD   levocetirizine (XYZAL) 5 MG tablet Take 5 mg by mouth Daily As Needed for allergies.    Ten Dockery MD       Past Medical History:   Diagnosis Date   • Alpha galactosidase deficiency     red meat allergy; \"alpha gal syndrome\"   • Anemia    • Arthritis    • Asthma    • COPD (chronic obstructive pulmonary disease) (CMS/[facility])  " "  • Diabetes mellitus (CMS/[facility])    • Endometriosis    • Fibromyalgia    • Goiter 2008   • Hyperlipidemia    • Hypertension     takes bp meds to help kidneys   • Neuropathy    • Sleep apnea    • SVT (supraventricular tachycardia) (CMS/[facility])    • Tinnitus    • Vitamin D deficiency        Past Surgical History:   Procedure Laterality Date   • CARPAL TUNNEL RELEASE Bilateral    • LAMINECTOMY  12/06/2017   • LAPAROSCOPIC TUBAL LIGATION     • TUBAL ABDOMINAL LIGATION         Social History     Socioeconomic History   • Marital status:      Spouse name: Not on file   • Number of children: Not on file   • Years of education: Not on file   • Highest education level: Not on file   Tobacco Use   • Smoking status: Never Smoker   • Smokeless tobacco: Never Used   Substance and Sexual Activity   • Alcohol use: No   • Drug use: No   • Sexual activity: Defer       Family History   Problem Relation Age of Onset   • Heart disease Mother    • HIV Mother    • Heart attack Father    • Scoliosis Sister    • Rheum arthritis Sister    • Heart disease Maternal Aunt    • Schizophrenia Son    • Diabetes Daughter    • Diverticulitis Daughter        Patient Active Problem List   Diagnosis   • Type 2 diabetes mellitus without complication, without long-term current use of insulin (CMS/[facility])   • Hyperlipidemia   • Essential hypertension   • Gastroesophageal reflux disease without esophagitis   • Angina pectoris (CMS/[facility])   • Paroxysmal SVT (supraventricular tachycardia) (CMS/[facility])   • Palpitations   • SOB (shortness of breath)   • COPD (chronic obstructive pulmonary disease) (CMS/[facility])        REVIEW OF SYSTEMS:   Review of Systems    12 point ROS was performed and is negative except as outlined in HPI.       Objective:     Vitals:    05/14/19 1008 05/16/19 0848   BP:  127/75   Pulse:  83   Resp:  18   Temp:  97.7 °F (36.5 °C)   SpO2:  99%   Weight: 74.4 kg (164 lb) 72.8 kg (160 lb 7.9 oz)   Height: 170.2 cm (67\") 170.2 cm (67\")     Body mass " "index is 25.14 kg/m².  Flowsheet Rows      First Filed Value   Admission Height  170.2 cm (67\") Documented at 05/14/2019 1008   Admission Weight  74.4 kg (164 lb) Documented at 05/14/2019 1008        No intake or output data in the 24 hours ending 05/16/19 1047      Physical Exam:  Constitutional: Oriented to person, place, and time.  Well-developed and well-nourished. No distress.   HENT: Normocephalic.   Eyes: Conjunctivae are normal. No scleral icterus.   Neck: Normal carotid pulses, no hepatojugular reflux and no JVD present. Carotid bruit is not present. No tracheal deviation, no edema and no erythema present. No thyromegaly present.   Cardiovascular: Normal rate, regular rhythm, S1 normal, S2 normal, normal heart sounds and intact distal pulses.   No extrasystoles are present.   Pulmonary/Chest: Effort normal and breath sounds normal. No respiratory distress.   Abdominal: Soft. Bowel sounds are normal. Exhibits no distension and no mass.   Musculoskeletal:  Exhibits no edema, no tenderness.   Neurological: Alert and awake.  Skin: Skin warm and dry.  Psychiatric: Normal mood and affect.      Lab Review:                Results from last 7 days   Lab Units 05/16/19  0926   SODIUM mmol/L 143   POTASSIUM mmol/L 4.0   CHLORIDE mmol/L 106   CO2 mmol/L 24.0   BUN mg/dL 8   CREATININE mg/dL 0.86   GLUCOSE mg/dL 87     Results from last 7 days   Lab Units 05/16/19  0926   WBC 10*3/mm3 5.22   HEMOGLOBIN g/dL 12.7   HEMATOCRIT % 40.3   PLATELETS 10*3/mm3 196     Results from last 7 days   Lab Units 05/16/19  0959   INR  0.83                     I personally viewed and interpreted the patient's EKG/Telemetry data.      Assessment/Plan:       #1 supraventricular tachycardia #2 palpitations #3 hypertension with hypertensive heart disease #4 diabetes #5 history of chest pain with a normal stress test    58 years old patient recently evaluated with Dr. Hammond with a background history of hypertension, hypertensive heart " disease, diabetes, history of palpitation with associated chest pain or dizziness and have documented supraventricular tachycardia during stress test with heart rate 180 bpm.  She had sudden onset and termination with associated chest pain.  She is not on AV dony sinus dony blocking drug.  She referred for further evaluation and management.  Multiple different options discussed with the patient and the family.  Option #1 medical management option #2 given the symptomatic supraventricular tachycardia consider EP study ablation.  Pros and cons of both options discussed with the patient the family.  They opted for EP study and ablation depending on outcome.  Procedure risk explained.  Risks included but not limited to infection, bleeding, stroke, pneumothorax, hematoma, heart block understand willing to proceed forward.            Thank you for allowing me to participate in the care of Brittni Hagen. Feel free to contact me directly with any further questions or concerns.    Mckay Jiménez MD  05/16/19  10:47 AM.      EMR Dragon/Transcription disclaimer:   Much of this encounter note is an electronic transcription/translation of spoken language to printed text. The electronic translation of spoken language may permit erroneous, or at times, nonsensical words or phrases to be inadvertently transcribed; Although I have reviewed the note for such errors, some may still exist.

## 2019-05-16 NOTE — ANESTHESIA POSTPROCEDURE EVALUATION
Patient: Brittni Hagen    Procedure Summary     Date:  05/16/19 Room / Location:  [facility] CATH/EP LAB 3 /  [facility] CATH INVASIVE LOCATION    Anesthesia Start:  1101 Anesthesia Stop:  1338    Procedure:  EP/Ablation for Supraventricular Tachycardia (N/A ) Diagnosis:       Paroxysmal SVT (supraventricular tachycardia) (CMS/HCC)      Palpitations      SOB (shortness of breath)    Provider:  Mckay Jiménez MD Provider:  Bro Butcher MD    Anesthesia Type:  general ASA Status:  4          Anesthesia Type: general  Last vitals  BP   127/75 (05/16/19 0848)   Temp   97.7 °F (36.5 °C) (05/16/19 0848)   Pulse   83 (05/16/19 0848)   Resp   18 (05/16/19 0848)     SpO2   99 % (05/16/19 0848)     Post Anesthesia Care and Evaluation    Patient location during evaluation: bedside  Patient participation: complete - patient participated  Level of consciousness: sleepy but conscious  Pain score: 0  Pain management: adequate  Airway patency: patent  Anesthetic complications: No anesthetic complications  PONV Status: none  Cardiovascular status: acceptable and stable  Respiratory status: acceptable  Hydration status: stable

## 2019-05-16 NOTE — ANESTHESIA PREPROCEDURE EVALUATION
Anesthesia Evaluation     Patient summary reviewed and Nursing notes reviewed   no history of anesthetic complications:  NPO Solid Status: > 8 hours  NPO Liquid Status: > 8 hours           Airway   Mallampati: II  TM distance: >3 FB  Neck ROM: full  possible difficult intubation  Dental    (+) poor dentation    Pulmonary - normal exam    breath sounds clear to auscultation  (+) asthma, shortness of breath, sleep apnea,   (-) not a smoker    ROS comment:    FINDINGS: Comparison exam dated January 25, 2017. Cardiac and  pulmonary vasculature are normal. Lungs are clear. Pleural spaces  are normal. No acute osseous abnormality.     IMPRESSION:  Negative single view chest     Electronically signed by:  Moose Germain MD  4/26/2019 10:25 AM CDT  Cardiovascular - normal exam    ECG reviewed  Rhythm: regular  Rate: normal    (+) hypertension, dysrhythmias (SVT) Tachycardia, angina (Angina with SVT), hyperlipidemia,   (-) murmur    ROS comment: Sinus tachycardia  Otherwise normal ECG    Confirmed by LALITA CHO ([id number]) on 4/27/2019 9:20:05 AMLeft Ventricle Left ventricular function is normal. Estimated EF appears to be in the range of 61 - 65%. Normal left ventricular cavity size, wall thickness and mass noted. All left ventricular wall segments contract normally. Septal wall motion is normal. There is no evidence of a left ventricular mass or thrombus present.     Neuro/Psych- negative ROS  GI/Hepatic/Renal/Endo    (+)  GERD well controlled,  diabetes mellitus type 2,     ROS Comment: HGB 10.9 HCT 34.9    Musculoskeletal     Abdominal    Substance History - negative use     OB/GYN negative ob/gyn ROS         Other   (+) arthritis                     Anesthesia Plan    ASA 4     general     intravenous induction   Anesthetic plan, all risks, benefits, and alternatives have been provided, discussed and informed consent has been obtained with: patient, spouse/significant other and child.

## 2019-05-17 VITALS
HEART RATE: 68 BPM | OXYGEN SATURATION: 96 % | TEMPERATURE: 97.9 F | SYSTOLIC BLOOD PRESSURE: 116 MMHG | DIASTOLIC BLOOD PRESSURE: 57 MMHG | BODY MASS INDEX: 25.13 KG/M2 | HEIGHT: 67 IN | RESPIRATION RATE: 18 BRPM | WEIGHT: 160.1 LBS

## 2019-05-17 PROCEDURE — 99217 PR OBSERVATION CARE DISCHARGE MANAGEMENT: CPT | Performed by: INTERNAL MEDICINE

## 2019-05-17 RX ORDER — FLECAINIDE ACETATE 50 MG/1
50 TABLET ORAL EVERY 12 HOURS SCHEDULED
Qty: 60 TABLET | Refills: 5 | Status: SHIPPED | OUTPATIENT
Start: 2019-05-17 | End: 2019-11-04 | Stop reason: SDUPTHER

## 2019-05-17 RX ADMIN — LEVETIRACETAM 750 MG: 500 TABLET ORAL at 08:35

## 2019-05-17 RX ADMIN — FLECAINIDE ACETATE 50 MG: 50 TABLET ORAL at 08:35

## 2019-05-17 RX ADMIN — APIXABAN 2.5 MG: 2.5 TABLET, FILM COATED ORAL at 08:35

## 2019-05-17 RX ADMIN — DILTIAZEM HYDROCHLORIDE 30 MG: 30 TABLET, FILM COATED ORAL at 08:35

## 2019-05-17 RX ADMIN — IBUPROFEN 600 MG: 600 TABLET ORAL at 08:35

## 2019-05-17 NOTE — DISCHARGE SUMMARY
DISCHARGE SUMMARY      Date of Discharge:  5/17/2019    Discharge Diagnosis:   1. Paroxysmal SVT (supraventricular tachycardia) (CMS/HCC)    2. Palpitations    3. SOB (shortness of breath)        Presenting Problem/History of Present Illness  Paroxysmal SVT (supraventricular tachycardia) (CMS/HCC) [I47.1]  Palpitations [R00.2]  SOB (shortness of breath) [R06.02]  Paroxysmal SVT (supraventricular tachycardia) (CMS/HCC) [I47.1]     Hospital Course  Patient is a 58 y.o. female presented with history of palpitations documented supraventricular tachycardia with a different mechanism.  Patient underwent EP study and is easily inducible atrial tachycardia with mid posterolateral localization of the nas terminalis at the site of phrenic nerve stimulation's.  Less than 22nd careful energy was delivered and no further tachycardia was induced.  We did not know the long-term outcome as the energy was not delivered more than a minute or so at this part due to diaphragmatic stimulation.  She started on flecainide 50 mg p.o. twice daily along with continuation of of AV dony blocking drug.  I will give Eliquis 2.5 mg p.o. twice daily for DVT prophylaxis after the procedure and will discontinue after 3 to 4-week.  The patient is a traveling by car to visit on family after 24 hours so she has anaphylactic allergic reaction to heparin    Procedures Performed  Procedure(s):  EP/Ablation for Supraventricular Tachycardia       Consults:   Consults     No orders found for last 30 day(s).           Lab Results  Lab Results (last 24 hours)     Procedure Component Value Units Date/Time    POC Glucose Once [[id number]]  (Normal) Collected:  05/16/19 1356    Specimen:  Blood Updated:  05/16/19 1836     Glucose 97 mg/dL      Comment: : [phone] Replaced by [facility] AMAND[facility] ID: [id number]       POC Glucose Once [[id number]]  (Normal) Collected:  05/16/19 1504    Specimen:  Blood Updated:  05/16/19 1615     Glucose 96 mg/dL      Comment:  : [phone] [facility] ID: [id number]       Protime-INR [[id number]]  (Normal) Collected:  05/16/19 0959    Specimen:  Blood Updated:  05/16/19 1031     Protime 11.3 Seconds      INR 0.83    Narrative:       Therapeutic range for most indications is 2.0-3.0 INR,  or 2.5-3.5 for mechanical heart valves.    Basic Metabolic Panel [[id number]] Collected:  05/16/19 0926    Specimen:  Blood Updated:  05/16/19 0957     Glucose 87 mg/dL      BUN 8 mg/dL      Creatinine 0.86 mg/dL      Sodium 143 mmol/L      Potassium 4.0 mmol/L      Chloride 106 mmol/L      CO2 24.0 mmol/L      Calcium 9.3 mg/dL      eGFR Non African Amer 68 mL/min/1.73      BUN/Creatinine Ratio 9.3     Anion Gap 13.0 mmol/L     Narrative:       GFR Normal >60  Chronic Kidney Disease <60  Kidney Failure <15          Condition on Discharge: Stable    ROS  Patient denies orthopnea, PND, nauseous, vomiting.  Denies palpitation fluttering.  Monitor sinus rhythm  Vital Signs  Temp:  [97 °F (36.1 °C)-98.4 °F (36.9 °C)] 97.9 °F (36.6 °C)  Heart Rate:  [] 68  Resp:  [14-18] 18  BP: ()/(51-95) 116/57    Physical Exam:  Physical Exam  Lungs are clear to auscultation.  Cardiovascular examination regular rate rhythm no S3 no pericardial rub.  Abdomen soft nontender extremity no cyanosis or clubbing.  Neurological examination cranial nerves second 12 are grossly intact  Discharge Disposition  Home or Self Care    Discharge Medications     Discharge Medications      New Medications      Instructions Start Date   apixaban 2.5 MG tablet tablet  Commonly known as:  ELIQUIS   2.5 mg, Oral, Every 12 Hours Scheduled      flecainide 50 MG tablet  Commonly known as:  TAMBOCOR   50 mg, Oral, Every 12 Hours Scheduled         Continue These Medications      Instructions Start Date   albuterol sulfate  (90 Base) MCG/ACT inhaler  Commonly known as:  PROVENTIL HFA;VENTOLIN HFA;PROAIR HFA   2 puffs, Inhalation, Every 6 Hours      atorvastatin 20 MG  tablet  Commonly known as:  LIPITOR   20 mg, Oral, Nightly      diltiaZEM 30 MG tablet  Commonly known as:  CARDIZEM   30 mg, Oral, 3 Times Daily      EPINEPHrine 0.15 MG/0.3ML solution auto-injector injection  Commonly known as:  EPIPEN JR   0.15 mg, Intramuscular, As Needed      gabapentin 800 MG tablet  Commonly known as:  NEURONTIN   800 mg, Oral, 3 Times Daily      glucose blood test strip   No dose, route, or frequency recorded.      ibuprofen 600 MG tablet  Commonly known as:  ADVIL,MOTRIN   600 mg, Oral, Every 8 Hours PRN      levETIRAcetam 750 MG tablet  Commonly known as:  KEPPRA   750 mg, Oral, 2 Times Daily      levocetirizine 5 MG tablet  Commonly known as:  XYZAL   5 mg, Oral, Daily PRN      losartan 25 MG tablet  Commonly known as:  COZAAR   12.5 mg, Oral, Nightly      metFORMIN 500 MG tablet  Commonly known as:  GLUCOPHAGE   500 mg, Oral, 2 Times Daily With Meals      QVAR 40 MCG/ACT inhaler  Generic drug:  beclomethasone   2 puffs, Inhalation, Every 4 Hours PRN      raNITIdine 150 MG tablet  Commonly known as:  ZANTAC   300 mg, Oral, Nightly             DC Instructions: Do not lift weight or drive for 24 hours.    Discharge Diet: cardiac diabetic    Activity at Discharge: as tolerated    Follow-up Appointments  Future Appointments   Date Time Provider Department Center   10/15/2019  4:00 PM Mckay Jiménez MD [facility] JAGDISH Jiménez MD  05/17/19  9:39 AM

## 2019-05-21 ENCOUNTER — DOCUMENTATION (OUTPATIENT)
Dept: CARDIOLOGY | Facility: CLINIC | Age: 59
End: 2019-05-21

## 2019-05-21 ENCOUNTER — OFFICE VISIT (OUTPATIENT)
Dept: CARDIOLOGY | Facility: CLINIC | Age: 59
End: 2019-05-21

## 2019-05-21 VITALS
BODY MASS INDEX: 25.71 KG/M2 | HEART RATE: 84 BPM | SYSTOLIC BLOOD PRESSURE: 126 MMHG | WEIGHT: 163.8 LBS | HEIGHT: 67 IN | DIASTOLIC BLOOD PRESSURE: 74 MMHG

## 2019-05-21 DIAGNOSIS — I47.1 PAROXYSMAL SVT (SUPRAVENTRICULAR TACHYCARDIA) (HCC): Primary | ICD-10-CM

## 2019-05-21 DIAGNOSIS — E11.9 TYPE 2 DIABETES MELLITUS WITHOUT COMPLICATION, WITHOUT LONG-TERM CURRENT USE OF INSULIN (HCC): ICD-10-CM

## 2019-05-21 DIAGNOSIS — E78.00 PURE HYPERCHOLESTEROLEMIA: ICD-10-CM

## 2019-05-21 DIAGNOSIS — K21.9 GASTROESOPHAGEAL REFLUX DISEASE WITHOUT ESOPHAGITIS: ICD-10-CM

## 2019-05-21 PROCEDURE — 93000 ELECTROCARDIOGRAM COMPLETE: CPT | Performed by: INTERNAL MEDICINE

## 2019-05-21 PROCEDURE — 99214 OFFICE O/P EST MOD 30 MIN: CPT | Performed by: INTERNAL MEDICINE

## 2019-05-21 RX ORDER — DILTIAZEM HYDROCHLORIDE 120 MG/1
120 CAPSULE, COATED, EXTENDED RELEASE ORAL DAILY
Qty: 90 CAPSULE | Refills: 3 | Status: SHIPPED | OUTPATIENT
Start: 2019-05-21 | End: 2019-05-21

## 2019-05-21 NOTE — PROGRESS NOTES
Brittni Hagen  58 y.o. female    05/21/2019  1. Paroxysmal SVT (supraventricular tachycardia) (CMS/[facility])    2. Gastroesophageal reflux disease without esophagitis    3. Type 2 diabetes mellitus without complication, without long-term current use of insulin (CMS/[facility])    4. Pure hypercholesterolemia        History of Present Illness:      Patient is a 58 y.o. female presented  after EP study with inducible atrial tachycardia along the mid posterior lateral nas terminalis at the site of phrenic nerve stimulation.  She had an episode of palpitation and I have lengthy discussion with the patient and medication adjusted at this stage with history of palpitations documented supraventricular tachycardia with a different mechanism.  Patient underwent EP study and is easily inducible atrial tachycardia with mid posterolateral localization of the nas terminalis at the site of phrenic nerve stimulation's.  Less than 22nd careful energy was delivered and no further tachycardia was induced.  We did not know the long-term outcome as the energy was not delivered more than a minute or so at this part due to diaphragmatic stimulation.  She started on flecainide 50 mg p.o. twice daily along with continuation of of AV dony blocking drug.  I will give Eliquis 2.5 mg p.o. twice daily for DVT prophylaxis after the procedure and will discontinue after 3 to 4-week.  The patient is a traveling by car to visit on family after 24 hours so she has anaphylactic allergic reaction to heparin   Evaluated by Dr. Hammond for chest pain underwent stress test low risk study with good heart function and developed supraventricular tachycardia at a rate of 180 bpm during stress test with a history of palpitation and associated chest pain/dizziness of more than 2 years duration with sudden onset and termination and referred for possible EP study and ablations and background history of diabetes, epilepsy, hyperlipidemia, hypothyroidism,  "hypertension has been a diabetic for about 12 years has been having right-sided chest pain radiating to right arm with shortness of breath.  She has history of headache with aura and almost a partial syncope.  She has history of 2 seizures and is being followed by Dr. Recinos in [place].          · Patient developed supraventricular tachycardia with diffuse nonspecific ST-T changes with a heart rate of up to 181 bpm during recovery which lasted for a couple of minutes after which the patient returned to sinus rhythm.  · Baseline EKG showed sinus rhythm with incomplete right bundle branch block.  · Left ventricular ejection fraction is hyperdynamic (Calculated EF > 70%).  · Myocardial perfusion imaging indicates a normal myocardial perfusion study with no evidence of ischemia.  · Impressions are consistent with a low risk study.          SUBJECTIVE:    Allergies   Allergen Reactions   • Beef-Derived Products Hives     Red meats, Alpha-gal Syndrome    • Amoxicillin Hives   • Grass Hives   • Green Soap Tincture Itching   • Promethazine      Gave her the shakes   • Red Dye Itching         Past Medical History:   Diagnosis Date   • Alpha galactosidase deficiency     red meat allergy; \"alpha gal syndrome\"   • Anemia    • Arthritis    • Asthma    • COPD (chronic obstructive pulmonary disease) (CMS/HCC)    • Diabetes mellitus (CMS/HCC)    • Endometriosis    • Fibromyalgia    • Goiter 2008   • Hyperlipidemia    • Hypertension     takes bp meds to help kidneys   • Neuropathy    • Sleep apnea    • SVT (supraventricular tachycardia) (CMS/HCC)    • Tinnitus    • Vitamin D deficiency          Past Surgical History:   Procedure Laterality Date   • CARDIAC ELECTROPHYSIOLOGY PROCEDURE N/A 5/16/2019    Procedure: EP/Ablation for Supraventricular Tachycardia;  Surgeon: Mckay Jiménez MD;  Location: [facility] INVASIVE LOCATION;  Service: Cardiology   • CARPAL TUNNEL RELEASE Bilateral    • LAMINECTOMY  12/06/2017   • LAPAROSCOPIC " TUBAL LIGATION     • TUBAL ABDOMINAL LIGATION           Family History   Problem Relation Age of Onset   • Heart disease Mother    • HIV Mother    • Heart attack Father    • Scoliosis Sister    • Rheum arthritis Sister    • Heart disease Maternal Aunt    • Schizophrenia Son    • Diabetes Daughter    • Diverticulitis Daughter          Social History     Socioeconomic History   • Marital status:      Spouse name: Not on file   • Number of children: Not on file   • Years of education: Not on file   • Highest education level: Not on file   Tobacco Use   • Smoking status: Never Smoker   • Smokeless tobacco: Never Used   Substance and Sexual Activity   • Alcohol use: No   • Drug use: No   • Sexual activity: Defer         Current Outpatient Medications   Medication Sig Dispense Refill   • albuterol (PROVENTIL HFA;VENTOLIN HFA) 108 (90 BASE) MCG/ACT inhaler Inhale 2 puffs Every 6 (Six) Hours.     • apixaban (ELIQUIS) 2.5 MG tablet tablet Take 1 tablet by mouth Every 12 (Twelve) Hours. 60 tablet 0   • atorvastatin (LIPITOR) 20 MG tablet Take 20 mg by mouth Every Night.     • beclomethasone (QVAR) 40 MCG/ACT inhaler Inhale 2 puffs Every 4 (Four) Hours As Needed.     • EPINEPHrine (EPIPEN JR) 0.15 MG/0.3ML solution auto-injector injection Inject 0.15 mg into the shoulder, thigh, or buttocks As Needed.     • flecainide (TAMBOCOR) 50 MG tablet Take 1 tablet by mouth Every 12 (Twelve) Hours. 60 tablet 5   • gabapentin (NEURONTIN) 800 MG tablet Take 800 mg by mouth 3 (Three) Times a Day.     • glucose blood test strip      • ibuprofen (ADVIL,MOTRIN) 600 MG tablet Take 600 mg by mouth Every 8 (Eight) Hours As Needed for Mild Pain .     • levETIRAcetam (KEPPRA) 750 MG tablet Take 750 mg by mouth 2 (Two) Times a Day.     • levocetirizine (XYZAL) 5 MG tablet Take 5 mg by mouth Daily As Needed for allergies.     • losartan (COZAAR) 25 MG tablet Take 12.5 mg by mouth Every Night.     • metFORMIN (GLUCOPHAGE) 500 MG tablet Take  "500 mg by mouth 2 (Two) Times a Day With Meals.     • raNITIdine (ZANTAC) 150 MG tablet Take 300 mg by mouth Every Night.     • diltiaZEM CD (CARDIZEM CD) 120 MG 24 hr capsule Take 1 capsule by mouth Daily. 90 capsule 3     No current facility-administered medications for this visit.            Review of Systems:     Constitutional:  Denies recent weight loss, weight gain, fever or chills, no change in exercise tolerance.     HENT:  Denies any hearing loss, epistaxis, hoarseness, or difficulty speaking.     Eyes: No blurred    Respiratory:  Denies dyspnea with exertion,no cough, wheezing, or hemoptysis.     Cardiovascular: See H&P    Gastrointestinal:  Denies change in bowel habits, dyspepsia, ulcer disease, hematochezia, or melena.     Endocrine: Negative for cold intolerance, heat intolerance, polydipsia, polyphagia and polyuria. Denies any history of weight change, polydipsia, polyuria.     Genitourinary: Negative.      Musculoskeletal: Denies any history of arthritic symptoms or back problems.     Skin:  Denies any change in hair or nails, rashes, or skin lesions.     Allergic/Immunologic: Negative.  Negative for environmental allergies, food allergies and immunocompromised state.     Neurological:  Denies any history of recurrent headaches, strokes, TIA, or seizure disorder.     Hematological: Denies any food allergies, seasonal allergies, bleeding disorders, or lymphadenopathy.     Psychiatric/Behavioral: Denies any history of depression, substance abuse, or change in cognitive function.       OBJECTIVE:    /74   Pulse 84   Ht 170.2 cm (67\")   Wt 74.3 kg (163 lb 12.8 oz)   LMP  (LMP Unknown)   BMI 25.65 kg/m²       Physical Exam:     Constitutional: Cooperative, alert and oriented, well-developed, well-nourished, in no acute distress.     HENT:   Head: Normocephalic, normal hair patterns, no masses or tenderness.  Ears, Nose, and Throat: No gross abnormalities. No pallor or cyanosis. Dentition good. "   Eyes: EOMS intact, PERRL, conjunctivae and lids unremarkable. Fundoscopic exam and visual fields not performed.   Neck: No palpable masses or adenopathy, no thyromegaly, no JVD, carotid pulses are full and equal bilaterally and without  Bruits.     Cardiovascular: Regular rhythm, S1 and S2 normal, no S3 or S4. Apical impulse not displaced. No murmurs, gallops, or rubs detected.     Pulmonary/Chest: Chest: normal symmetry, no tenderness to palpation, normal respiratory excursion, no intercostal retraction, no use of accessory muscles. Pulmonary: Normal breath sounds. No rales or rhonchi.    Abdominal: Abdomen soft, bowel sounds normoactive, no masses, no hepatosplenomegaly, non-tender, no bruits.     Musculoskeletal: No deformities, clubbing, cyanosis, erythema, or edema observed. There are no spinal abnormalities noted. Normal muscle strength and tone. Pulses full and equal in all extremities, no bruits auscultated.     Neurological: No gross motor or sensory deficits noted, affect appropriate, oriented to time, person, place.     Skin: Warm and dry to the touch, no apparent skin lesions or masses noted.     Psychiatric: She has a normal mood and affect. Her behavior is normal. Judgment and thought content normal.         Procedures      Lab Results   Component Value Date    WBC 5.22 05/16/2019    HGB 12.7 05/16/2019    HCT 40.3 05/16/2019    MCV 91.0 05/16/2019     05/16/2019     Lab Results   Component Value Date    GLUCOSE 87 05/16/2019    BUN 8 05/16/2019    CREATININE 0.86 05/16/2019    EGFRIFNONA 68 05/16/2019    BCR 9.3 05/16/2019    CO2 24.0 05/16/2019    CALCIUM 9.3 05/16/2019    ALBUMIN 3.90 04/26/2019    AST 17 04/26/2019    ALT 14 04/26/2019     Lab Results   Component Value Date    CHOL 174 01/26/2017     Lab Results   Component Value Date    TRIG 124 01/26/2017     Lab Results   Component Value Date    HDL 61 01/26/2017     No components found for: LDLCALC  Lab Results   Component Value Date     LDL 87 01/26/2017     No results found for: HDLLDLRATIO  No components found for: CHOLHDL  No results found for: HGBA1C  Lab Results   Component Value Date    TSH 0.900 12/10/2017    P0XVFMR 9.46 12/10/2017           ASSESSMENT AND PLAN:  #1 supraventricular tachycardia #2 palpitations #3 hypertension with hypertensive heart disease #4 diabetes #5 history of chest pain with a normal stress test     58 years old patient recently evaluated with Dr. Hammond with a background history of hypertension, hypertensive heart disease, diabetes, history of palpitation with associated chest pain or dizziness and have documented supraventricular tachycardia during stress test with heart rate 180 bpm patient underwent electrophysiology study had easily inducible atrial tachycardia localized lung the mid posterior lateral nas terminalis.  The situs earliest activation and phrenic nerve stimulation and no ablation was performed given the risk of diaphragmatic paralysis with RF.  She had no palpitations and being managed with the flecainide starting during hospitalization 50 mg twice a day.  I will increase the dose 100 mg twice a day and started on Cardizem  mg instead of Cardizem 30 mg every 8 hours.  She can take the short-acting diltiazem during tachycardia to control the ventricular rate.  EKG sinus rhythm at 84 bpm with normal intervals        Brittni was seen today for follow-up.    Diagnoses and all orders for this visit:    Paroxysmal SVT (supraventricular tachycardia) (CMS/HCC)    Gastroesophageal reflux disease without esophagitis    Type 2 diabetes mellitus without complication, without long-term current use of insulin (CMS/HCC)    Pure hypercholesterolemia        Mckay Jiménez MD  5/21/2019  12:01 PM

## 2019-05-21 NOTE — PROGRESS NOTES
Pharmacy called and patient cannot take the Diltiazem 120 that Dr. Jiménez wanted her to switch to.  I spoke with him and he said for her to go back to what she was on which was diltiazem 30 mg three times a day.

## 2019-09-03 ENCOUNTER — APPOINTMENT (OUTPATIENT)
Dept: LAB | Facility: HOSPITAL | Age: 59
End: 2019-09-03

## 2019-09-03 ENCOUNTER — TRANSCRIBE ORDERS (OUTPATIENT)
Dept: LAB | Facility: HOSPITAL | Age: 59
End: 2019-09-03

## 2019-09-03 DIAGNOSIS — R80.9 PROTEINURIA, UNSPECIFIED TYPE: Primary | ICD-10-CM

## 2019-09-03 LAB
BACTERIA UR QL AUTO: ABNORMAL /HPF
BILIRUB UR QL STRIP: NEGATIVE
CLARITY UR: CLEAR
COLOR UR: YELLOW
GLUCOSE UR STRIP-MCNC: NEGATIVE MG/DL
HGB UR QL STRIP.AUTO: ABNORMAL
HYALINE CASTS UR QL AUTO: ABNORMAL /LPF
KETONES UR QL STRIP: NEGATIVE
LEUKOCYTE ESTERASE UR QL STRIP.AUTO: NEGATIVE
NITRITE UR QL STRIP: NEGATIVE
PH UR STRIP.AUTO: 5.5 [PH] (ref 5–8)
PROT UR QL STRIP: NEGATIVE
RBC # UR: ABNORMAL /HPF
REF LAB TEST METHOD: ABNORMAL
SP GR UR STRIP: 1.02 (ref 1–1.03)
SQUAMOUS #/AREA URNS HPF: ABNORMAL /HPF
UROBILINOGEN UR QL STRIP: ABNORMAL
WBC UR QL AUTO: ABNORMAL /HPF

## 2019-09-03 PROCEDURE — 81001 URINALYSIS AUTO W/SCOPE: CPT | Performed by: INTERNAL MEDICINE

## 2019-10-14 DIAGNOSIS — I47.1 PAROXYSMAL SVT (SUPRAVENTRICULAR TACHYCARDIA) (HCC): Primary | ICD-10-CM

## 2019-11-04 RX ORDER — FLECAINIDE ACETATE 50 MG/1
TABLET ORAL
Qty: 60 TABLET | Refills: 0 | Status: SHIPPED | OUTPATIENT
Start: 2019-11-04 | End: 2019-12-23

## 2019-12-23 RX ORDER — FLECAINIDE ACETATE 50 MG/1
TABLET ORAL
Qty: 60 TABLET | Refills: 2 | Status: SHIPPED | OUTPATIENT
Start: 2019-12-23 | End: 2020-03-23

## 2020-03-02 ENCOUNTER — TRANSCRIBE ORDERS (OUTPATIENT)
Dept: LAB | Facility: HOSPITAL | Age: 60
End: 2020-03-02

## 2020-03-02 ENCOUNTER — APPOINTMENT (OUTPATIENT)
Dept: LAB | Facility: HOSPITAL | Age: 60
End: 2020-03-02

## 2020-03-02 DIAGNOSIS — R80.9 PROTEINURIA, UNSPECIFIED TYPE: Primary | ICD-10-CM

## 2020-03-02 LAB
ALBUMIN SERPL-MCNC: 4.4 G/DL (ref 3.5–5.2)
ANION GAP SERPL CALCULATED.3IONS-SCNC: 17.7 MMOL/L (ref 5–15)
BUN BLD-MCNC: 12 MG/DL (ref 6–20)
BUN/CREAT SERPL: 13.2 (ref 7–25)
CALCIUM SPEC-SCNC: 9.3 MG/DL (ref 8.6–10.5)
CHLORIDE SERPL-SCNC: 101 MMOL/L (ref 98–107)
CO2 SERPL-SCNC: 20.3 MMOL/L (ref 22–29)
CREAT BLD-MCNC: 0.91 MG/DL (ref 0.57–1)
CREAT UR-MCNC: 21.1 MG/DL
GFR SERPL CREATININE-BSD FRML MDRD: 64 ML/MIN/1.73
GLUCOSE BLD-MCNC: 133 MG/DL (ref 65–99)
PHOSPHATE SERPL-MCNC: 4 MG/DL (ref 2.5–4.5)
POTASSIUM BLD-SCNC: 4 MMOL/L (ref 3.5–5.2)
PROT UR-MCNC: 4 MG/DL
PROT/CREAT UR: 189.6 MG/G CREA (ref 0–200)
SODIUM BLD-SCNC: 139 MMOL/L (ref 136–145)

## 2020-03-02 PROCEDURE — 82570 ASSAY OF URINE CREATININE: CPT | Performed by: INTERNAL MEDICINE

## 2020-03-02 PROCEDURE — 80069 RENAL FUNCTION PANEL: CPT | Performed by: INTERNAL MEDICINE

## 2020-03-02 PROCEDURE — 84156 ASSAY OF PROTEIN URINE: CPT | Performed by: INTERNAL MEDICINE

## 2020-03-02 PROCEDURE — 36415 COLL VENOUS BLD VENIPUNCTURE: CPT | Performed by: INTERNAL MEDICINE

## 2020-03-12 ENCOUNTER — HOSPITAL ENCOUNTER (OUTPATIENT)
Dept: ULTRASOUND IMAGING | Facility: HOSPITAL | Age: 60
Discharge: HOME | End: 2020-03-12
Admitting: INTERNAL MEDICINE

## 2020-03-12 DIAGNOSIS — R80.9 PROTEINURIA, UNSPECIFIED TYPE: ICD-10-CM

## 2020-03-12 PROCEDURE — 76775 US EXAM ABDO BACK WALL LIM: CPT

## 2020-03-23 RX ORDER — FLECAINIDE ACETATE 50 MG/1
TABLET ORAL
Qty: 60 TABLET | Refills: 0 | Status: SHIPPED | OUTPATIENT
Start: 2020-03-23 | End: 2020-04-07 | Stop reason: SDUPTHER

## 2020-04-07 RX ORDER — FLECAINIDE ACETATE 50 MG/1
50 TABLET ORAL EVERY 12 HOURS
Qty: 180 TABLET | Refills: 2 | Status: SHIPPED | OUTPATIENT
Start: 2020-04-07 | End: 2020-11-15

## 2020-04-13 NOTE — THERAPY TREATMENT NOTE
"    Outpatient Physical Therapy Ortho Treatment Note  [facility]     Patient Name: Brittni Hagen  : 1960  MRN: [id number]  Today's Date: 4/10/2018      Visit Date: 04/10/2018     Subjective Improvement 50%  Visits   Visits approved 75 per year  RTMD PRN  Recert 2018    S/P lumbar laminectomy 2017    Visit Dx:    ICD-10-CM ICD-9-CM   1. Lumbosacral neuritis M54.17 724.4   2. Lumbosacral radiculopathy M54.17 724.4   3. Chronic low back pain with right-sided sciatica, unspecified back pain laterality M54.41 724.2    G89.29 724.3     338.29       Patient Active Problem List   Diagnosis   • Type 2 diabetes mellitus   • Hyperlipidemia   • Essential hypertension   • Gastroesophageal reflux disease without esophagitis        Past Medical History:   Diagnosis Date   • Alpha galactosidase deficiency     red meat allergy; \"alpha gal syndrome\"   • Anemia    • Arthritis    • Asthma    • Diabetes mellitus    • Endometriosis    • Fibromyalgia    • Goiter    • Hyperlipidemia    • Hypertension     takes bp meds to help kidneys   • Neuropathy    • Sleep apnea    • Tinnitus    • Vitamin D deficiency         Past Surgical History:   Procedure Laterality Date   • CARPAL TUNNEL RELEASE Bilateral    • LAMINECTOMY  2017   • LAPAROSCOPIC TUBAL LIGATION     • TUBAL ABDOMINAL LIGATION                                       Exercises     Row Name 04/10/18 1600             Subjective Pain    Able to rate subjective pain? yes  -CP      Pre-Treatment Pain Level 4  -CP      Post-Treatment Pain Level 3  -CP         Aquatics    Aquatics performed? Yes  -CP         Exercise 1    Exercise Name 1 aqu walk 3 way  -CP      Time 1 5' each  -CP         Exercise 2    Exercise Name 2 aqu cr/tr  -CP      Reps 2 20  -CP         Exercise 3    Exercise Name 3 aqu mini squats  -CP      Reps 3 20  -CP         Exercise 4    Exercise Name 4 aqu standing hs stretch  -CP      Sets 4 3  -CP      Time 4 30  -CP      " Taking care of PA.   Additional Comments bilateral  -CP         Exercise 5    Exercise Name 5 aqu hip 3 way  -CP      Sets 5 2  -CP      Reps 5 10  -CP      Time 5 bilateral  -CP         Exercise 6    Reps 6 20  -CP      Time (Seconds) 6 aqu t runk rotation with cane  -CP         Exercise 7    Sets 7 15  -CP      Reps 7 paddles  -CP      Time (Seconds) 7 aqu shoulder 3 way with TA  -CP         Exercise 8    Reps 8 20  -CP      Time 8 yellow  -CP      Time (Seconds) 8 aqu float step  -CP         Exercise 9    Exercise Name 9 aqu deep hand  -CP      Time 9 10  -CP        User Key  (r) = Recorded By, (t) = Taken By, (c) = Cosigned By    Initials Name Provider Type    CP Rachel Zamora, PTA Physical Therapy Assistant                               PT OP Goals     Row Name 04/10/18 1700          PT Short Term Goals    STG Date to Achieve 04/02/18  -CP     STG 1 Patient will report a 50% subjective improvement  -CP     STG 1 Progress Met  -CP     STG 2 Patient will report pain </= 5/10 at all times  -CP     STG 2 Progress Not Met  -CP     STG 3 Patient will improve lumbar AROM to 75% of full range or greater  -CP     STG 3 Progress Progressing  -CP        Long Term Goals    LTG Date to Achieve 04/23/18  -CP     LTG 1 Patient will be indepedent with HEP  -CP     LTG 1 Progress Partially Met  -CP     LTG 2 Patient will improve R hip flexion and ABD MMT from to 4+/5  -CP     LTG 2 Progress Progressing  -CP     LTG 3 Patient will have lumbar AROM all planes WFL and pain </= 1/10  -CP     LTG 3 Progress Progressing  -CP     LTG 4 Patient will improve modified Oswestry from 27/50 to </= 15/50  -CP     LTG 4 Progress Not Met  -CP     LTG 5 Patient will improve B HS flexibility to WFL  -CP     LTG 5 Progress Progressing  -CP     LTG 6 Patient will be able to ambulate > 30 min with pain </= 2/10  -CP     LTG 6 Progress Progressing  -CP        Time Calculation    PT Goal Re-Cert Due Date 04/02/18  -CP       User Key  (r) = Recorded By, (t) = Taken By,  (c) = Cosigned By    Initials Name Provider Type    CP Rachel Zamora PTA Physical Therapy Assistant                         Time Calculation:   Start Time: 1648  Stop Time: 1743  Time Calculation (min): 55 min  Total Timed Code Minutes- PT: 55 minute(s)    Therapy Charges for Today     Code Description Service Date Service Provider Modifiers Qty    01443985192 HC PT THER PROC EA 15 MIN 4/10/2018 Rachel Zamora PTA GP 4                    Rachel Zamora PTA  4/10/2018

## 2020-05-15 ENCOUNTER — APPOINTMENT (OUTPATIENT)
Dept: MRI IMAGING | Facility: HOSPITAL | Age: 60
End: 2020-05-15

## 2020-05-28 ENCOUNTER — HOSPITAL ENCOUNTER (OUTPATIENT)
Dept: MRI IMAGING | Facility: HOSPITAL | Age: 60
Discharge: HOME | End: 2020-05-28
Admitting: PHYSICAL MEDICINE & REHABILITATION

## 2020-05-28 DIAGNOSIS — M54.50 LOW BACK PAIN, UNSPECIFIED BACK PAIN LATERALITY, UNSPECIFIED CHRONICITY, UNSPECIFIED WHETHER SCIATICA PRESENT: ICD-10-CM

## 2020-05-28 PROCEDURE — 72148 MRI LUMBAR SPINE W/O DYE: CPT

## 2020-07-14 DIAGNOSIS — I47.1 PAROXYSMAL SVT (SUPRAVENTRICULAR TACHYCARDIA) (HCC): Primary | ICD-10-CM

## 2020-08-05 ENCOUNTER — LAB (OUTPATIENT)
Dept: LAB | Facility: HOSPITAL | Age: 60
End: 2020-08-05

## 2020-08-05 ENCOUNTER — OFFICE VISIT (OUTPATIENT)
Dept: CARDIOLOGY | Facility: CLINIC | Age: 60
End: 2020-08-05

## 2020-08-05 VITALS
DIASTOLIC BLOOD PRESSURE: 82 MMHG | BODY MASS INDEX: 26.53 KG/M2 | WEIGHT: 169 LBS | HEIGHT: 67 IN | SYSTOLIC BLOOD PRESSURE: 112 MMHG | OXYGEN SATURATION: 98 % | HEART RATE: 75 BPM

## 2020-08-05 DIAGNOSIS — I47.1 PAROXYSMAL SVT (SUPRAVENTRICULAR TACHYCARDIA) (HCC): ICD-10-CM

## 2020-08-05 DIAGNOSIS — I10 ESSENTIAL HYPERTENSION: ICD-10-CM

## 2020-08-05 DIAGNOSIS — E78.00 PURE HYPERCHOLESTEROLEMIA: ICD-10-CM

## 2020-08-05 DIAGNOSIS — R00.2 PALPITATIONS: ICD-10-CM

## 2020-08-05 DIAGNOSIS — I20.9 ANGINA PECTORIS (HCC): ICD-10-CM

## 2020-08-05 DIAGNOSIS — I47.1 PAROXYSMAL SVT (SUPRAVENTRICULAR TACHYCARDIA) (HCC): Primary | ICD-10-CM

## 2020-08-05 LAB — 25(OH)D3 SERPL-MCNC: 49.6 NG/ML (ref 30–100)

## 2020-08-05 PROCEDURE — 93000 ELECTROCARDIOGRAM COMPLETE: CPT | Performed by: INTERNAL MEDICINE

## 2020-08-05 PROCEDURE — 99213 OFFICE O/P EST LOW 20 MIN: CPT | Performed by: INTERNAL MEDICINE

## 2020-08-05 PROCEDURE — 36415 COLL VENOUS BLD VENIPUNCTURE: CPT

## 2020-08-05 PROCEDURE — 82306 VITAMIN D 25 HYDROXY: CPT

## 2020-08-05 RX ORDER — GABAPENTIN 600 MG/1
600 TABLET ORAL DAILY
COMMUNITY
Start: 2020-07-02

## 2020-08-05 RX ORDER — OXYCODONE AND ACETAMINOPHEN 10; 325 MG/1; MG/1
10 TABLET ORAL DAILY
COMMUNITY
Start: 2020-07-11

## 2020-08-05 NOTE — PROGRESS NOTES
Brittni Hagen  59 y.o. female    05/21/2019  1. Paroxysmal SVT (supraventricular tachycardia) (CMS/HCC)    2. Pure hypercholesterolemia    3. Essential hypertension    4. Angina pectoris (CMS/HCC)    5. Palpitations        History of Present Illness:      Patient is a 58 y.o. female presented  after EP study with inducible atrial  With focus atmid nas terminalis at the site of phrenic nerve stimulation.  She had an episode of palpitation and I have lengthy discussion with the patient and medication adjusted at this stage with history of palpitations documented supraventricular tachycardia with a different mechanism.  Patient underwent EP study and is easily inducible atrial tachycardia with mid posterolateral localization of the nas terminalis at the site of phrenic nerve stimulation's.  Less than 30 seconds careful energy was delivered and no further tachycardia was induced.    Was started on flecainide 50 mg twice a day had significant improvement in recur. 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 Hammond for chest pain underwent stress test low risk study with good heart function and developed supraventricular tachycardia at a rate of 180 bpm during stress test with a history of palpitation and associated chest pain/dizziness of more than 2 years duration with sudden onset and termination and referred for possible EP study and ablations and background history of diabetes, epilepsy, hyperlipidemia, hypothyroidism, hypertension has been a diabetic for about 12 years has been having right-sided chest pain radiating to right arm with shortness of breath.  She has history of headache with aura and almost a partial syncope.  She has history of 2 seizures and is being followed by Dr. Recinos in [place].          · Patient developed supraventricular tachycardia with diffuse nonspecific ST-T changes with a heart rate of up to 181 bpm during recovery which lasted for  "a couple of minutes after which the patient returned to sinus rhythm.  · Baseline EKG showed sinus rhythm with incomplete right bundle branch block.  · Left ventricular ejection fraction is hyperdynamic (Calculated EF > 70%).  · Myocardial perfusion imaging indicates a normal myocardial perfusion study with no evidence of ischemia.  · Impressions are consistent with a low risk study.          SUBJECTIVE:    Allergies   Allergen Reactions   • Beef-Derived Products Hives     Red meats, Alpha-gal Syndrome    • Amoxicillin Hives   • Grass Hives   • Green Soap Tincture Itching   • Promethazine      Gave her the shakes   • Red Dye Itching         Past Medical History:   Diagnosis Date   • Alpha galactosidase deficiency     red meat allergy; \"alpha gal syndrome\"   • Anemia    • Arthritis    • Asthma    • COPD (chronic obstructive pulmonary disease) (CMS/[facility])    • Diabetes mellitus (CMS/[facility])    • Endometriosis    • Fibromyalgia    • Goiter 2008   • Hyperlipidemia    • Hypertension     takes bp meds to help kidneys   • Neuropathy    • Sleep apnea    • SVT (supraventricular tachycardia) (CMS/[facility])    • Tinnitus    • Vitamin D deficiency          Past Surgical History:   Procedure Laterality Date   • CARDIAC ELECTROPHYSIOLOGY PROCEDURE N/A 5/16/2019    Procedure: EP/Ablation for Supraventricular Tachycardia;  Surgeon: Mckay Jiménez MD;  Location: [facility] INVASIVE LOCATION;  Service: Cardiology   • CARPAL TUNNEL RELEASE Bilateral    • LAMINECTOMY  12/06/2017   • LAPAROSCOPIC TUBAL LIGATION     • TUBAL ABDOMINAL LIGATION           Family History   Problem Relation Age of Onset   • Heart disease Mother    • HIV Mother    • Heart attack Father    • Scoliosis Sister    • Rheum arthritis Sister    • Heart disease Maternal Aunt    • Schizophrenia Son    • Diabetes Daughter    • Diverticulitis Daughter          Social History     Socioeconomic History   • Marital status:      Spouse name: Not on file   • Number of " children: Not on file   • Years of education: Not on file   • Highest education level: Not on file   Tobacco Use   • Smoking status: Never Smoker   • Smokeless tobacco: Never Used   Substance and Sexual Activity   • Alcohol use: No   • Drug use: No   • Sexual activity: Defer         Current Outpatient Medications   Medication Sig Dispense Refill   • albuterol (PROVENTIL HFA;VENTOLIN HFA) 108 (90 BASE) MCG/ACT inhaler Inhale 2 puffs Every 6 (Six) Hours.     • atorvastatin (LIPITOR) 20 MG tablet Take 20 mg by mouth Every Night.     • beclomethasone (QVAR) 40 MCG/ACT inhaler Inhale 2 puffs Every 4 (Four) Hours As Needed.     • dilTIAZem (CARDIZEM) 30 MG tablet Take 1 tablet by mouth 3 (Three) Times a Day. 270 tablet 2   • EPINEPHrine (EPIPEN JR) 0.15 MG/0.3ML solution auto-injector injection Inject 0.15 mg into the shoulder, thigh, or buttocks As Needed.     • flecainide (TAMBOCOR) 50 MG tablet Take 1 tablet by mouth Every 12 (Twelve) Hours. 180 tablet 2   • gabapentin (NEURONTIN) 600 MG tablet Take 600 mg by mouth Daily.     • glucose blood test strip      • levETIRAcetam (KEPPRA) 750 MG tablet Take 750 mg by mouth 2 (Two) Times a Day.     • levocetirizine (XYZAL) 5 MG tablet Take 5 mg by mouth Daily As Needed for allergies.     • losartan (COZAAR) 25 MG tablet Take 12.5 mg by mouth Every Night.     • metFORMIN (GLUCOPHAGE) 500 MG tablet Take 500 mg by mouth 2 (Two) Times a Day With Meals.     • oxyCODONE-acetaminophen (PERCOCET)  MG per tablet Take 10 tablets by mouth Daily.       No current facility-administered medications for this visit.            Review of Systems:     Constitutional:  Denies recent weight loss, weight gain, fever or chills, no change in exercise tolerance.     HENT:  Denies any hearing loss, epistaxis, hoarseness, or difficulty speaking.     Eyes: No blurred    Respiratory:  Denies dyspnea with exertion,no cough, wheezing, or hemoptysis.     Cardiovascular: See H&P    Gastrointestinal:   "Denies change in bowel habits, dyspepsia, ulcer disease, hematochezia, or melena.     Endocrine: Negative for cold intolerance, heat intolerance, polydipsia, polyphagia and polyuria. Denies any history of weight change, polydipsia, polyuria.     Genitourinary: Negative.      Musculoskeletal: Denies any history of arthritic symptoms or back problems.     Skin:  Denies any change in hair or nails, rashes, or skin lesions.     Allergic/Immunologic: Negative.  Negative for environmental allergies, food allergies and immunocompromised state.     Neurological:  Denies any history of recurrent headaches, strokes, TIA, or seizure disorder.     Hematological: Denies any food allergies, seasonal allergies, bleeding disorders, or lymphadenopathy.     Psychiatric/Behavioral: Denies any history of depression, substance abuse, or change in cognitive function.       OBJECTIVE:    /82   Pulse 75   Ht 170.2 cm (67.01\")   Wt 76.7 kg (169 lb)   SpO2 98%   BMI 26.46 kg/m²       Physical Exam:     Constitutional: Cooperative, alert and oriented, well-developed, well-nourished, in no acute distress.     HENT:   Head: Normocephalic, normal hair patterns, no masses or tenderness.  Ears, Nose, and Throat: No gross abnormalities. No pallor or cyanosis. Dentition good.   Eyes: EOMS intact, PERRL, conjunctivae and lids unremarkable. Fundoscopic exam and visual fields not performed.   Neck: No palpable masses or adenopathy, no thyromegaly, no JVD, carotid pulses are full and equal bilaterally and without  Bruits.     Cardiovascular: Regular rhythm, S1 and S2 normal, no S3 or S4. Apical impulse not displaced. No murmurs, gallops, or rubs detected.     Pulmonary/Chest: Chest: normal symmetry, no tenderness to palpation, normal respiratory excursion, no intercostal retraction, no use of accessory muscles. Pulmonary: Normal breath sounds. No rales or rhonchi.    Abdominal: Abdomen soft, bowel sounds normoactive, no masses, no " hepatosplenomegaly, non-tender, no bruits.     Musculoskeletal: No deformities, clubbing, cyanosis, erythema, or edema observed. There are no spinal abnormalities noted. Normal muscle strength and tone. Pulses full and equal in all extremities, no bruits auscultated.     Neurological: No gross motor or sensory deficits noted, affect appropriate, oriented to time, person, place.     Skin: Warm and dry to the touch, no apparent skin lesions or masses noted.     Psychiatric: She has a normal mood and affect. Her behavior is normal. Judgment and thought content normal.         Procedures      Lab Results   Component Value Date    WBC 5.22 05/16/2019    HGB 12.7 05/16/2019    HCT 40.3 05/16/2019    MCV 91.0 05/16/2019     05/16/2019     Lab Results   Component Value Date    GLUCOSE 133 (H) 03/02/2020    BUN 12 03/02/2020    CREATININE 0.91 03/02/2020    EGFRIFNONA 64 03/02/2020    BCR 13.2 03/02/2020    CO2 20.3 (L) 03/02/2020    CALCIUM 9.3 03/02/2020    ALBUMIN 4.40 03/02/2020    AST 17 04/26/2019    ALT 14 04/26/2019     Lab Results   Component Value Date    CHOL 174 01/26/2017     Lab Results   Component Value Date    TRIG 124 01/26/2017     Lab Results   Component Value Date    HDL 61 01/26/2017     No components found for: LDLCALC  Lab Results   Component Value Date    LDL 87 01/26/2017     No results found for: HDLLDLRATIO  No components found for: CHOLHDL  No results found for: HGBA1C  Lab Results   Component Value Date    TSH 0.900 12/10/2017    S9SRHNP 9.46 12/10/2017           ASSESSMENT AND PLAN:  #1 supraventricular tachycardia #2 palpitations #3 hypertension with hypertensive heart disease #4 diabetes #5 history of chest pain with a normal stress test     59 years old patient with a background history of supraventricular tachycardia and different mechanism underwent EP study has easily inducible atrial tachycardia with a focus at nas terminalis at the site of phrenic nerve stimulation For 30  seconds RF was delivered no further tachycardia was induced for good success rate will need at least 1 minutes minute of RF delivery patient started on flecainide has significant improvement in palpitation her main problem is fatigability lack of energy and will check the vitamin D and have asked the patient to hold atorvastatin for 1 to 2-week and see if there is improvement if there is significant improvement then she can take half a tablet once or twice per week        Brittni was seen today for follow-up.    Diagnoses and all orders for this visit:    Paroxysmal SVT (supraventricular tachycardia) (CMS/HCC)  -     ECG 12 Lead    Pure hypercholesterolemia    Essential hypertension    Angina pectoris (CMS/HCC)    Palpitations        Mckay Jiménez MD  8/5/2020  11:09

## 2020-11-15 PROCEDURE — U0003 INFECTIOUS AGENT DETECTION BY NUCLEIC ACID (DNA OR RNA); SEVERE ACUTE RESPIRATORY SYNDROME CORONAVIRUS 2 (SARS-COV-2) (CORONAVIRUS DISEASE [COVID-19]), AMPLIFIED PROBE TECHNIQUE, MAKING USE OF HIGH THROUGHPUT TECHNOLOGIES AS DESCRIBED BY CMS-2020-01-R: HCPCS | Performed by: NURSE PRACTITIONER

## 2021-02-05 RX ORDER — FLECAINIDE ACETATE 50 MG/1
50 TABLET ORAL 2 TIMES DAILY
Qty: 180 TABLET | Refills: 3 | Status: SHIPPED | OUTPATIENT
Start: 2021-02-05 | End: 2021-02-17 | Stop reason: SDUPTHER

## 2021-02-05 NOTE — TELEPHONE ENCOUNTER
----- Message from Mitch Morrell sent at 2/5/2021 11:10 AM CST -----  [facility] she needs called into [facility].  She said it normally goes to [facility] but they didn't get it in time and she is out.      Thanks    Mitch

## 2021-02-17 ENCOUNTER — OFFICE VISIT (OUTPATIENT)
Dept: CARDIOLOGY | Facility: CLINIC | Age: 61
End: 2021-02-17

## 2021-02-17 VITALS
HEIGHT: 67 IN | BODY MASS INDEX: 27.39 KG/M2 | DIASTOLIC BLOOD PRESSURE: 74 MMHG | OXYGEN SATURATION: 98 % | HEART RATE: 76 BPM | WEIGHT: 174.5 LBS | SYSTOLIC BLOOD PRESSURE: 128 MMHG

## 2021-02-17 DIAGNOSIS — I10 ESSENTIAL HYPERTENSION: ICD-10-CM

## 2021-02-17 DIAGNOSIS — I47.1 PAROXYSMAL SVT (SUPRAVENTRICULAR TACHYCARDIA) (HCC): ICD-10-CM

## 2021-02-17 DIAGNOSIS — I10 ESSENTIAL HYPERTENSION: Primary | ICD-10-CM

## 2021-02-17 DIAGNOSIS — R00.2 PALPITATIONS: ICD-10-CM

## 2021-02-17 DIAGNOSIS — E78.00 PURE HYPERCHOLESTEROLEMIA: Primary | ICD-10-CM

## 2021-02-17 PROBLEM — I20.9 ANGINA PECTORIS: Status: RESOLVED | Noted: 2019-03-12 | Resolved: 2021-02-17

## 2021-02-17 PROCEDURE — 93000 ELECTROCARDIOGRAM COMPLETE: CPT | Performed by: INTERNAL MEDICINE

## 2021-02-17 PROCEDURE — 99213 OFFICE O/P EST LOW 20 MIN: CPT | Performed by: INTERNAL MEDICINE

## 2021-02-17 RX ORDER — FLECAINIDE ACETATE 50 MG/1
50 TABLET ORAL 2 TIMES DAILY
Qty: 180 TABLET | Refills: 3 | Status: SHIPPED | OUTPATIENT
Start: 2021-02-17 | End: 2021-05-11

## 2021-02-19 LAB
QT INTERVAL: 386 MS
QTC INTERVAL: 431 MS

## 2021-05-11 RX ORDER — FLECAINIDE ACETATE 50 MG/1
TABLET ORAL
Qty: 180 TABLET | Refills: 3 | Status: SHIPPED | OUTPATIENT
Start: 2021-05-11 | End: 2022-07-12

## 2021-07-08 ENCOUNTER — LAB (OUTPATIENT)
Dept: LAB | Facility: HOSPITAL | Age: 61
End: 2021-07-08

## 2021-07-08 ENCOUNTER — TRANSCRIBE ORDERS (OUTPATIENT)
Dept: LAB | Facility: HOSPITAL | Age: 61
End: 2021-07-08

## 2021-07-08 DIAGNOSIS — Z91.018 ALLERGY TO OTHER FOODS: Primary | ICD-10-CM

## 2021-07-08 DIAGNOSIS — Z91.018 ALLERGY TO OTHER FOODS: ICD-10-CM

## 2021-07-08 PROCEDURE — 86003 ALLG SPEC IGE CRUDE XTRC EA: CPT

## 2021-07-08 PROCEDURE — 82785 ASSAY OF IGE: CPT

## 2021-07-08 PROCEDURE — 36415 COLL VENOUS BLD VENIPUNCTURE: CPT

## 2021-07-08 PROCEDURE — 86008 ALLG SPEC IGE RECOMB EA: CPT

## 2021-07-12 LAB
COW MILK IGE QN: 0.3 KU/L
IGE SERPL-ACNC: 98 IU/ML (ref 6–495)

## 2021-07-14 LAB
ALPHA-GAL IGE QN: 17.3 KU/L
BEEF IGE QN: 5.03 KU/L
DEPRECATED BEEF IGE RAST QL: 3
DEPRECATED LAMB IGE RAST QL: 1
DEPRECATED PORK IGE RAST QL: 2
LAMB IGE QN: 0.43 KU/L
PORK IGE QN: 1.78 KU/L

## 2021-07-15 LAB
REF LAB TEST METHOD: NORMAL
REF LAB TEST METHOD: NORMAL

## 2021-07-19 ENCOUNTER — HOSPITAL ENCOUNTER (OUTPATIENT)
Dept: MRI IMAGING | Facility: HOSPITAL | Age: 61
Discharge: HOME | End: 2021-07-19
Admitting: NURSE PRACTITIONER

## 2021-07-19 DIAGNOSIS — M47.26 OTHER SPONDYLOSIS WITH RADICULOPATHY, LUMBAR REGION: ICD-10-CM

## 2021-07-19 PROCEDURE — 72148 MRI LUMBAR SPINE W/O DYE: CPT

## 2021-12-14 ENCOUNTER — TRANSCRIBE ORDERS (OUTPATIENT)
Dept: LAB | Facility: HOSPITAL | Age: 61
End: 2021-12-14

## 2021-12-14 DIAGNOSIS — R80.9 PROTEINURIA, UNSPECIFIED TYPE: Primary | ICD-10-CM

## 2022-02-24 ENCOUNTER — TRANSCRIBE ORDERS (OUTPATIENT)
Dept: LAB | Facility: HOSPITAL | Age: 62
End: 2022-02-24

## 2022-02-24 DIAGNOSIS — R80.9 PROTEINURIA, UNSPECIFIED TYPE: Primary | ICD-10-CM

## 2022-05-30 ENCOUNTER — HOSPITAL ENCOUNTER (EMERGENCY)
Facility: HOSPITAL | Age: 62
Discharge: HOME | End: 2022-05-30
Attending: EMERGENCY MEDICINE | Admitting: EMERGENCY MEDICINE

## 2022-05-30 ENCOUNTER — APPOINTMENT (OUTPATIENT)
Dept: GENERAL RADIOLOGY | Facility: HOSPITAL | Age: 62
End: 2022-05-30

## 2022-05-30 VITALS
HEIGHT: 67 IN | HEART RATE: 76 BPM | WEIGHT: 165 LBS | RESPIRATION RATE: 18 BRPM | DIASTOLIC BLOOD PRESSURE: 86 MMHG | SYSTOLIC BLOOD PRESSURE: 148 MMHG | OXYGEN SATURATION: 99 % | BODY MASS INDEX: 25.9 KG/M2

## 2022-05-30 DIAGNOSIS — J44.1 COPD WITH ACUTE EXACERBATION: Primary | ICD-10-CM

## 2022-05-30 LAB
ALBUMIN SERPL-MCNC: 4.4 G/DL (ref 3.5–5.2)
ALBUMIN/GLOB SERPL: 1.6 G/DL
ALP SERPL-CCNC: 66 U/L (ref 39–117)
ALT SERPL W P-5'-P-CCNC: 14 U/L (ref 1–33)
ANION GAP SERPL CALCULATED.3IONS-SCNC: 11 MMOL/L (ref 5–15)
AST SERPL-CCNC: 16 U/L (ref 1–32)
BASOPHILS # BLD AUTO: 0.04 10*3/MM3 (ref 0–0.2)
BASOPHILS NFR BLD AUTO: 0.6 % (ref 0–1.5)
BILIRUB SERPL-MCNC: 0.5 MG/DL (ref 0–1.2)
BUN SERPL-MCNC: 10 MG/DL (ref 6–20)
BUN/CREAT SERPL: 10.9 (ref 7–25)
CALCIUM SPEC-SCNC: 9.7 MG/DL (ref 8.6–10.5)
CHLORIDE SERPL-SCNC: 104 MMOL/L (ref 98–107)
CO2 SERPL-SCNC: 26 MMOL/L (ref 22–29)
CREAT SERPL-MCNC: 0.92 MG/DL (ref 0.57–1)
DEPRECATED RDW RBC AUTO: 47.3 FL (ref 37–54)
EGFRCR SERPLBLD CKD-EPI 2021: 71.9 ML/MIN/1.73
EOSINOPHIL # BLD AUTO: 0.08 10*3/MM3 (ref 0–0.4)
EOSINOPHIL NFR BLD AUTO: 1.2 % (ref 0.3–6.2)
ERYTHROCYTE [DISTWIDTH] IN BLOOD BY AUTOMATED COUNT: 14.2 % (ref 12.3–15.4)
FLUAV SUBTYP SPEC NAA+PROBE: NOT DETECTED
FLUBV RNA ISLT QL NAA+PROBE: NOT DETECTED
GLOBULIN UR ELPH-MCNC: 2.7 GM/DL
GLUCOSE SERPL-MCNC: 81 MG/DL (ref 65–99)
HCT VFR BLD AUTO: 42.7 % (ref 34–46.6)
HGB BLD-MCNC: 14.1 G/DL (ref 12–15.9)
HOLD SPECIMEN: NORMAL
HOLD SPECIMEN: NORMAL
IMM GRANULOCYTES # BLD AUTO: 0.02 10*3/MM3 (ref 0–0.05)
IMM GRANULOCYTES NFR BLD AUTO: 0.3 % (ref 0–0.5)
LYMPHOCYTES # BLD AUTO: 2.04 10*3/MM3 (ref 0.7–3.1)
LYMPHOCYTES NFR BLD AUTO: 29.5 % (ref 19.6–45.3)
MAGNESIUM SERPL-MCNC: 2 MG/DL (ref 1.6–2.6)
MCH RBC QN AUTO: 30.1 PG (ref 26.6–33)
MCHC RBC AUTO-ENTMCNC: 33 G/DL (ref 31.5–35.7)
MCV RBC AUTO: 91 FL (ref 79–97)
MONOCYTES # BLD AUTO: 0.74 10*3/MM3 (ref 0.1–0.9)
MONOCYTES NFR BLD AUTO: 10.7 % (ref 5–12)
NEUTROPHILS NFR BLD AUTO: 4 10*3/MM3 (ref 1.7–7)
NEUTROPHILS NFR BLD AUTO: 57.7 % (ref 42.7–76)
NRBC BLD AUTO-RTO: 0 /100 WBC (ref 0–0.2)
NT-PROBNP SERPL-MCNC: 170.4 PG/ML (ref 0–900)
PLATELET # BLD AUTO: 259 10*3/MM3 (ref 140–450)
PMV BLD AUTO: 10.8 FL (ref 6–12)
POTASSIUM SERPL-SCNC: 3.7 MMOL/L (ref 3.5–5.2)
PROT SERPL-MCNC: 7.1 G/DL (ref 6–8.5)
QT INTERVAL: 372 MS
QTC INTERVAL: 455 MS
RBC # BLD AUTO: 4.69 10*6/MM3 (ref 3.77–5.28)
SARS-COV-2 RNA PNL SPEC NAA+PROBE: NOT DETECTED
SODIUM SERPL-SCNC: 141 MMOL/L (ref 136–145)
TROPONIN T SERPL-MCNC: <0.01 NG/ML (ref 0–0.03)
WBC NRBC COR # BLD: 6.92 10*3/MM3 (ref 3.4–10.8)
WHOLE BLOOD HOLD SPECIMEN: NORMAL

## 2022-05-30 PROCEDURE — 94640 AIRWAY INHALATION TREATMENT: CPT

## 2022-05-30 PROCEDURE — 80053 COMPREHEN METABOLIC PANEL: CPT | Performed by: EMERGENCY MEDICINE

## 2022-05-30 PROCEDURE — 83735 ASSAY OF MAGNESIUM: CPT | Performed by: EMERGENCY MEDICINE

## 2022-05-30 PROCEDURE — 94664 DEMO&/EVAL PT USE INHALER: CPT

## 2022-05-30 PROCEDURE — 84484 ASSAY OF TROPONIN QUANT: CPT | Performed by: EMERGENCY MEDICINE

## 2022-05-30 PROCEDURE — 85025 COMPLETE CBC W/AUTO DIFF WBC: CPT | Performed by: EMERGENCY MEDICINE

## 2022-05-30 PROCEDURE — 25010000002 METHYLPREDNISOLONE PER 125 MG: Performed by: EMERGENCY MEDICINE

## 2022-05-30 PROCEDURE — 87636 SARSCOV2 & INF A&B AMP PRB: CPT

## 2022-05-30 PROCEDURE — 96372 THER/PROPH/DIAG INJ SC/IM: CPT

## 2022-05-30 PROCEDURE — 99284 EMERGENCY DEPT VISIT MOD MDM: CPT

## 2022-05-30 PROCEDURE — 71045 X-RAY EXAM CHEST 1 VIEW: CPT

## 2022-05-30 PROCEDURE — 93010 ELECTROCARDIOGRAM REPORT: CPT | Performed by: INTERNAL MEDICINE

## 2022-05-30 PROCEDURE — 93005 ELECTROCARDIOGRAM TRACING: CPT

## 2022-05-30 PROCEDURE — 93005 ELECTROCARDIOGRAM TRACING: CPT | Performed by: EMERGENCY MEDICINE

## 2022-05-30 PROCEDURE — 36415 COLL VENOUS BLD VENIPUNCTURE: CPT

## 2022-05-30 PROCEDURE — 83880 ASSAY OF NATRIURETIC PEPTIDE: CPT | Performed by: EMERGENCY MEDICINE

## 2022-05-30 RX ORDER — SODIUM CHLORIDE 0.9 % (FLUSH) 0.9 %
10 SYRINGE (ML) INJECTION AS NEEDED
Status: DISCONTINUED | OUTPATIENT
Start: 2022-05-30 | End: 2022-05-30 | Stop reason: HOSPADM

## 2022-05-30 RX ORDER — INHALER, ASSIST DEVICES
1 SPACER (EA) MISCELLANEOUS EVERY 4 HOURS PRN
Qty: 1 EACH | Refills: 0 | Status: SHIPPED | OUTPATIENT
Start: 2022-05-30 | End: 2023-05-30

## 2022-05-30 RX ORDER — METHYLPREDNISOLONE SODIUM SUCCINATE 125 MG/2ML
80 INJECTION, POWDER, LYOPHILIZED, FOR SOLUTION INTRAMUSCULAR; INTRAVENOUS ONCE
Status: COMPLETED | OUTPATIENT
Start: 2022-05-30 | End: 2022-05-30

## 2022-05-30 RX ORDER — ACETAMINOPHEN 500 MG
1000 TABLET ORAL ONCE
Status: COMPLETED | OUTPATIENT
Start: 2022-05-30 | End: 2022-05-30

## 2022-05-30 RX ORDER — AZITHROMYCIN 500 MG/1
500 TABLET, FILM COATED ORAL DAILY
Qty: 4 TABLET | Refills: 0 | Status: SHIPPED | OUTPATIENT
Start: 2022-05-31 | End: 2022-10-17

## 2022-05-30 RX ORDER — ALBUTEROL SULFATE 90 UG/1
2 AEROSOL, METERED RESPIRATORY (INHALATION) EVERY 4 HOURS PRN
Qty: 18 G | Refills: 0 | Status: SHIPPED | OUTPATIENT
Start: 2022-05-30

## 2022-05-30 RX ORDER — AZITHROMYCIN 250 MG/1
500 TABLET, FILM COATED ORAL ONCE
Status: DISCONTINUED | OUTPATIENT
Start: 2022-05-30 | End: 2022-05-30 | Stop reason: HOSPADM

## 2022-05-30 RX ORDER — PREDNISONE 20 MG/1
20 TABLET ORAL DAILY
Qty: 5 TABLET | Refills: 0 | Status: SHIPPED | OUTPATIENT
Start: 2022-05-30 | End: 2022-06-04

## 2022-05-30 RX ORDER — IPRATROPIUM BROMIDE AND ALBUTEROL SULFATE 2.5; .5 MG/3ML; MG/3ML
3 SOLUTION RESPIRATORY (INHALATION) EVERY 4 HOURS PRN
Qty: 30 ML | Refills: 0 | Status: SHIPPED | OUTPATIENT
Start: 2022-05-30

## 2022-05-30 RX ORDER — IPRATROPIUM BROMIDE AND ALBUTEROL SULFATE 2.5; .5 MG/3ML; MG/3ML
3 SOLUTION RESPIRATORY (INHALATION) ONCE
Status: COMPLETED | OUTPATIENT
Start: 2022-05-30 | End: 2022-05-30

## 2022-05-30 RX ORDER — METHYLPREDNISOLONE SODIUM SUCCINATE 125 MG/2ML
125 INJECTION, POWDER, LYOPHILIZED, FOR SOLUTION INTRAMUSCULAR; INTRAVENOUS ONCE
Status: DISCONTINUED | OUTPATIENT
Start: 2022-05-30 | End: 2022-05-30

## 2022-05-30 RX ADMIN — IPRATROPIUM BROMIDE AND ALBUTEROL SULFATE 3 ML: 2.5; .5 SOLUTION RESPIRATORY (INHALATION) at 15:26

## 2022-05-30 RX ADMIN — METHYLPREDNISOLONE SODIUM SUCCINATE 80 MG: 125 INJECTION, POWDER, FOR SOLUTION INTRAMUSCULAR; INTRAVENOUS at 15:57

## 2022-05-30 RX ADMIN — ACETAMINOPHEN 1000 MG: 500 TABLET, FILM COATED ORAL at 15:01

## 2022-07-12 RX ORDER — FLECAINIDE ACETATE 50 MG/1
TABLET ORAL
Qty: 180 TABLET | Refills: 3 | Status: SHIPPED | OUTPATIENT
Start: 2022-07-12 | End: 2023-03-29 | Stop reason: SDUPTHER

## 2022-08-12 ENCOUNTER — OFFICE VISIT (OUTPATIENT)
Dept: CARDIOLOGY | Facility: CLINIC | Age: 62
End: 2022-08-12

## 2022-08-12 VITALS
DIASTOLIC BLOOD PRESSURE: 82 MMHG | SYSTOLIC BLOOD PRESSURE: 138 MMHG | HEART RATE: 69 BPM | BODY MASS INDEX: 26.9 KG/M2 | WEIGHT: 171.4 LBS | OXYGEN SATURATION: 95 % | HEIGHT: 67 IN

## 2022-08-12 DIAGNOSIS — I47.1 PAROXYSMAL SVT (SUPRAVENTRICULAR TACHYCARDIA): ICD-10-CM

## 2022-08-12 DIAGNOSIS — I10 ESSENTIAL HYPERTENSION: Primary | ICD-10-CM

## 2022-08-12 DIAGNOSIS — E78.00 PURE HYPERCHOLESTEROLEMIA: ICD-10-CM

## 2022-08-12 PROCEDURE — 93000 ELECTROCARDIOGRAM COMPLETE: CPT | Performed by: INTERNAL MEDICINE

## 2022-08-12 PROCEDURE — 99213 OFFICE O/P EST LOW 20 MIN: CPT | Performed by: INTERNAL MEDICINE

## 2022-08-12 NOTE — PROGRESS NOTES
Brittni Hagen  61 y.o. female    05/21/2019  1. Essential hypertension    2. Pure hypercholesterolemia    3. Paroxysmal SVT (supraventricular tachycardia) (HCC)        History of Present Illness:    Body mass index is 26.85 kg/m². BMI is above normal parameters. Recommendations include: exercise counseling, nutrition counseling and referral to primary care.      61 years old patient presented today, 8/12/2022 for routine follow-up with medical diagnosis of supraventricular tachycardia undefined mechanism underwent EP study has inducible atrial tachycardia located at nas terminalis at the site of phrenic nerve stimulation careful less than 32nd ablation was delivered after that no tachycardia was induced.  patient a brief episode of tachycardia initiated on flecainide with a significant improvement no further recurrence.  She is a pleased with her clinical outcome.  EKG in the office sinus rhythm with good average heart rate.  Other diagnosis of diabetes hypertension seizure disorder and follows with neurologist at [facility]      · Stress test 4/3/2020 2019         · patient developed supraventricular tachycardia with diffuse nonspecific ST-T changes with a heart rate of up to 181 bpm during recovery which lasted for a couple of minutes after which the patient returned to sinus rhythm.  · Baseline EKG showed sinus rhythm with incomplete right bundle branch block.  · Left ventricular ejection fraction is hyperdynamic (Calculated EF > 70%).  · Myocardial perfusion imaging indicates a normal myocardial perfusion study with no evidence of ischemia.  · Impressions are consistent with a low risk study.      Lipid 1/26/2027    Ref Range & Units 4yr ago   Total Cholesterol   0 - 199 mg/dL 174    Triglycerides   20 - 199 mg/dL 124    HDL Cholesterol   60 - 200 mg/dL 61    LDL Cholesterol    1 - 129 mg/dL 87    LDL/HDL Ratio   0.00 - 3.22 1.45            SUBJECTIVE:    Allergies   Allergen Reactions   •  "Beef-Derived Products Hives     Red meats, Alpha-gal Syndrome    • Amoxicillin Hives   • Grass Hives   • Green Soap Tincture Itching   • Promethazine      Gave her the shakes   • Red Dye Itching         Past Medical History:   Diagnosis Date   • Alpha galactosidase deficiency     red meat allergy; \"alpha gal syndrome\"   • Anemia    • Arthritis    • Asthma    • COPD (chronic obstructive pulmonary disease) ([facility])    • Diabetes mellitus ([facility])    • Endometriosis    • Fibrocystic breast    • Fibromyalgia    • Goiter 2008   • Hyperlipidemia    • Hypertension     takes bp meds to help kidneys   • Neuropathy    • Sleep apnea    • SVT (supraventricular tachycardia) ([facility])    • Tinnitus    • Vitamin D deficiency          Past Surgical History:   Procedure Laterality Date   • CARDIAC ELECTROPHYSIOLOGY PROCEDURE N/A 5/16/2019    Procedure: EP/Ablation for Supraventricular Tachycardia;  Surgeon: Mckay Jiménez MD;  Location: [facility] INVASIVE LOCATION;  Service: Cardiology   • CARPAL TUNNEL RELEASE Bilateral    • LAMINECTOMY  12/06/2017   • LAPAROSCOPIC TUBAL LIGATION     • TUBAL ABDOMINAL LIGATION           Family History   Problem Relation Age of Onset   • Heart disease Mother    • HIV Mother    • Heart attack Father    • Scoliosis Sister    • Rheum arthritis Sister    • Heart disease Maternal Aunt    • Schizophrenia Son    • Diabetes Daughter    • Diverticulitis Daughter          Social History     Socioeconomic History   • Marital status:    Tobacco Use   • Smoking status: Never Smoker   • Smokeless tobacco: Never Used   Substance and Sexual Activity   • Alcohol use: No   • Drug use: No   • Sexual activity: Defer         Current Outpatient Medications   Medication Sig Dispense Refill   • albuterol (PROVENTIL HFA;VENTOLIN HFA) 108 (90 BASE) MCG/ACT inhaler Inhale 2 puffs Every 6 (Six) Hours.     • albuterol sulfate  (90 Base) MCG/ACT inhaler Inhale 2 puffs Every 4 (Four) Hours As Needed for Wheezing or " Shortness of Air. 18 g 0   • atorvastatin (LIPITOR) 20 MG tablet Take 20 mg by mouth Every Night.     • azithromycin (ZITHROMAX) 500 MG tablet Take 1 tablet by mouth Daily. 4 tablet 0   • beclomethasone (QVAR) 40 MCG/ACT inhaler Inhale 2 puffs Every 4 (Four) Hours As Needed.     • dilTIAZem (CARDIZEM) 30 MG tablet TAKE 1 TABLET THREE TIMES A  tablet 3   • EPINEPHrine (EPIPEN JR) 0.15 MG/0.3ML solution auto-injector injection Inject 0.15 mg into the shoulder, thigh, or buttocks As Needed.     • flecainide (TAMBOCOR) 50 MG tablet TAKE 1 TABLET EVERY 12 HOURS 180 tablet 3   • fluticasone (FLONASE) 50 MCG/ACT nasal spray 1 spray into the nostril(s) as directed by provider Daily. 1 bottle 0   • gabapentin (NEURONTIN) 600 MG tablet Take 600 mg by mouth Daily.     • glucose blood test strip      • ipratropium-albuterol (DUO-NEB) 0.5-2.5 mg/3 ml nebulizer Take 3 mL by nebulization Every 4 (Four) Hours As Needed for Wheezing. 30 mL 0   • levETIRAcetam (KEPPRA) 750 MG tablet Take 750 mg by mouth 2 (Two) Times a Day.     • loratadine (CLARITIN) 10 MG tablet Take 1 tablet by mouth Daily. 30 tablet 0   • losartan (COZAAR) 25 MG tablet Take 12.5 mg by mouth Every Night.     • metFORMIN (GLUCOPHAGE) 500 MG tablet Take 500 mg by mouth 2 (Two) Times a Day With Meals.     • oxyCODONE-acetaminophen (PERCOCET)  MG per tablet Take 10 tablets by mouth Daily.     • Spacer/Aero-Holding Chambers (AeroChamber MV) inhaler 1 each by Other route Every 4 (Four) Hours As Needed (short of breath). Use as instructed 1 each 0     No current facility-administered medications for this visit.           Review of Systems:     Today dated 8/12/2022 no significant change was noted in the review of system compared to previous  Constitutional well built no apparent distress  HEENT no postnasal dripping no sore throat or symptom related to urinary reported    Pulmonary no cough, wheezing, or hemoptysis.     Cardiovascular: See  "H&P    Gastrointestinal:  Denies change in bowel habits, dyspepsia,     Endocrine: Negative for cold intolerance, heat intolerance, polydipsia, polyphagia and polyuria.    Genitourinary: Negative.      Musculoskeletal: Denies any history of arthritic symptoms or back problems.     Skin:  Denies   rashes, or skin lesions.     Allergic/Immunologic: Negative.  Negative for environmental allergies,    Neurological:  Denies  , strokes, TIA. Positive for history of seizure disorder    Hematological: Denies any food allergies, seasonal allergies    Psychiatric/Behavioral: Denies any history of depression,      OBJECTIVE:    /82 (BP Location: Left arm, Patient Position: Sitting, Cuff Size: Adult)   Pulse 69   Ht 170.2 cm (67\")   Wt 77.7 kg (171 lb 6.4 oz)   SpO2 95%   BMI 26.85 kg/m²     Today dated 8/12/2022 no significant change was noted in the physical exam compared to previous       physical Exam:     Constitutional: Cooperative, alert and oriented, well-developed, well-nourished, in no acute distress.     HENT:   Head: Normocephalic, atraumatic, conjunctive is pink, thyroid is nonpalpable no jugular is distention oral mucosa is moist    Cardiovascular: Regular rhythm, S1 and S2 normal, no S3 or S4. Apical impulse not displaced. No murmurs    Pulmonary/Chest: Chest: Good bilateral air entry no rales or wheezing    Abdominal: Abdomen soft, bowel sounds normoactive,     Musculoskeletal: No deformities, positive peripheral pulses no edema    Neurological: No gross motor or sensory deficits noted,    Skin: Warm and dry to the touch, no apparent skin lesions     Psychiatric: She has a normal mood and affect. Her behavior is normal.         Procedures      Lab Results   Component Value Date    WBC 6.92 05/30/2022    HGB 14.1 05/30/2022    HCT 42.7 05/30/2022    MCV 91.0 05/30/2022     05/30/2022     Lab Results   Component Value Date    GLUCOSE 81 05/30/2022    BUN 10 05/30/2022    CREATININE 0.92 " 05/30/2022    EGFRIFNONA 64 03/02/2020    BCR 10.9 05/30/2022    CO2 26.0 05/30/2022    CALCIUM 9.7 05/30/2022    ALBUMIN 4.40 05/30/2022    AST 16 05/30/2022    ALT 14 05/30/2022     Lab Results   Component Value Date    CHOL 174 01/26/2017     Lab Results   Component Value Date    TRIG 124 01/26/2017     Lab Results   Component Value Date    HDL 61 01/26/2017     No components found for: LDLCALC  Lab Results   Component Value Date    LDL 87 01/26/2017     No results found for: HDLLDLRATIO  No components found for: CHOLHDL  No results found for: HGBA1C  Lab Results   Component Value Date    TSH 0.900 12/10/2017    [id number] 9.46 12/10/2017           ASSESSMENT AND PLAN:  #1 supraventricular tachycardia     No further recurrence of tachycardia palpitations she is tolerating flecainide very well EKG sinus rhythm with a normal interval       #2 hypertension     Good blood pressure will continue diltiazem, losartan.  Significantly low carbohydrate, low-fat, DASH diet graded exercise discussed with the patient's.    Hyperlipidemia continue atorvastatin      I spent 16 minutes caring for Brittni on this date of service. This time includes time spent by me of counseling/coordination of care as relates to the presenting problem and any ordered procedures/tests as outlined above.         This document has been electronically signed by Mckay Jiménez MD on August 12, 2022 11:50 CDT            Diagnoses and all orders for this visit:    1. Essential hypertension (Primary)  -     ECG 12 Lead    2. Pure hypercholesterolemia    3. Paroxysmal SVT (supraventricular tachycardia) (HCC)        Mckay Jiménez MD  8/12/2022  11:42 CDT

## 2022-08-15 LAB
QT INTERVAL: 392 MS
QTC INTERVAL: 420 MS

## 2022-08-18 ENCOUNTER — LAB (OUTPATIENT)
Dept: LAB | Facility: HOSPITAL | Age: 62
End: 2022-08-18

## 2022-08-18 DIAGNOSIS — R80.9 PROTEINURIA, UNSPECIFIED TYPE: ICD-10-CM

## 2022-08-18 LAB
ALBUMIN SERPL-MCNC: 3.8 G/DL (ref 3.5–5.2)
ANION GAP SERPL CALCULATED.3IONS-SCNC: 19 MMOL/L (ref 5–15)
BUN SERPL-MCNC: 10 MG/DL (ref 6–20)
BUN/CREAT SERPL: 11.9 (ref 7–25)
CALCIUM SPEC-SCNC: 9.5 MG/DL (ref 8.6–10.5)
CHLORIDE SERPL-SCNC: 101 MMOL/L (ref 98–107)
CO2 SERPL-SCNC: 19 MMOL/L (ref 22–29)
CREAT SERPL-MCNC: 0.84 MG/DL (ref 0.57–1)
CREAT UR-MCNC: 29 MG/DL
EGFRCR SERPLBLD CKD-EPI 2021: 80.2 ML/MIN/1.73
GLUCOSE SERPL-MCNC: 82 MG/DL (ref 65–99)
PHOSPHATE SERPL-MCNC: 3.9 MG/DL (ref 2.5–4.5)
POTASSIUM SERPL-SCNC: 4 MMOL/L (ref 3.5–5.2)
PROT ?TM UR-MCNC: <4 MG/DL
PROT/CREAT UR: NORMAL MG/G{CREAT}
SODIUM SERPL-SCNC: 139 MMOL/L (ref 136–145)

## 2022-08-18 PROCEDURE — 36415 COLL VENOUS BLD VENIPUNCTURE: CPT

## 2022-08-18 PROCEDURE — 82570 ASSAY OF URINE CREATININE: CPT | Performed by: INTERNAL MEDICINE

## 2022-08-18 PROCEDURE — 84156 ASSAY OF PROTEIN URINE: CPT | Performed by: INTERNAL MEDICINE

## 2022-08-18 PROCEDURE — 80069 RENAL FUNCTION PANEL: CPT

## 2022-10-17 ENCOUNTER — OFFICE VISIT (OUTPATIENT)
Dept: FAMILY MEDICINE CLINIC | Facility: CLINIC | Age: 62
End: 2022-10-17

## 2022-10-17 ENCOUNTER — LAB (OUTPATIENT)
Dept: LAB | Facility: HOSPITAL | Age: 62
End: 2022-10-17

## 2022-10-17 VITALS
OXYGEN SATURATION: 96 % | BODY MASS INDEX: 26.6 KG/M2 | SYSTOLIC BLOOD PRESSURE: 141 MMHG | DIASTOLIC BLOOD PRESSURE: 80 MMHG | HEIGHT: 67 IN | WEIGHT: 169.5 LBS | HEART RATE: 98 BPM | TEMPERATURE: 97.1 F

## 2022-10-17 DIAGNOSIS — Q66.70 HIGH ARCHES: ICD-10-CM

## 2022-10-17 DIAGNOSIS — E11.40 TYPE 2 DIABETES MELLITUS WITH DIABETIC NEUROPATHY, WITHOUT LONG-TERM CURRENT USE OF INSULIN: Primary | ICD-10-CM

## 2022-10-17 DIAGNOSIS — M20.42 HAMMERTOES OF BOTH FEET: ICD-10-CM

## 2022-10-17 DIAGNOSIS — Z91.018 ALLERGY TO ALPHA-GAL: ICD-10-CM

## 2022-10-17 DIAGNOSIS — M20.41 HAMMERTOES OF BOTH FEET: ICD-10-CM

## 2022-10-17 PROCEDURE — 83036 HEMOGLOBIN GLYCOSYLATED A1C: CPT | Performed by: FAMILY MEDICINE

## 2022-10-17 PROCEDURE — 99214 OFFICE O/P EST MOD 30 MIN: CPT | Performed by: FAMILY MEDICINE

## 2022-10-17 PROCEDURE — 86008 ALLG SPEC IGE RECOMB EA: CPT | Performed by: FAMILY MEDICINE

## 2022-10-17 NOTE — PROGRESS NOTES
" Subjective   Brittni Hagen is a 62 y.o. female.     Chief Complaint   Patient presents with   • Establish Care             History of Present Illness     Has diabetes.  Needs diabetic shoes.   Also  Alpha gal 6-7 years, wants tested to see if improving.     Pmh:  Asthma, copd from second hand smoke, neuropathy, diabetes, back pain, sft, cholesterol, htn  Psh:  Btl, carpal tunnel both hands, back surgery  Sh: no tobacco, etoh, drugs.   Fh:  Diabetes.     Review of Systems   Constitutional: Negative for chills, fatigue and fever.   HENT: Negative for congestion, ear discharge, ear pain, facial swelling, hearing loss, postnasal drip, rhinorrhea, sinus pressure, sore throat, trouble swallowing and voice change.    Eyes: Negative for discharge, redness and visual disturbance.   Respiratory: Negative for cough, chest tightness, shortness of breath and wheezing.    Cardiovascular: Negative for chest pain and palpitations.   Gastrointestinal: Negative for abdominal pain, blood in stool, constipation, diarrhea, nausea and vomiting.   Endocrine: Negative for polydipsia and polyuria.   Genitourinary: Negative for dysuria, flank pain, hematuria and urgency.   Musculoskeletal: Negative for arthralgias, back pain, joint swelling and myalgias.   Skin: Negative for rash.   Neurological: Negative for dizziness, weakness, numbness and headaches.   Hematological: Negative for adenopathy.   Psychiatric/Behavioral: Negative for confusion and sleep disturbance. The patient is not nervous/anxious.            /80 (BP Location: Left arm, Patient Position: Sitting, Cuff Size: Adult)   Pulse 98   Temp 97.1 °F (36.2 °C) (Infrared)   Ht 170.2 cm (67.01\")   Wt 76.9 kg (169 lb 8 oz)   SpO2 96%   BMI 26.54 kg/m²       Objective     Physical Exam  Vitals and nursing note reviewed.   Constitutional:       Appearance: Normal appearance. She is well-developed.   HENT:      Head: Normocephalic and atraumatic.      Right Ear: " "External ear normal.      Left Ear: External ear normal.      Nose: Nose normal.   Eyes:      Extraocular Movements: Extraocular movements intact.      Conjunctiva/sclera: Conjunctivae normal.      Pupils: Pupils are equal, round, and reactive to light.   Cardiovascular:      Pulses:           Dorsalis pedis pulses are 2+ on the right side and 2+ on the left side.        Posterior tibial pulses are 2+ on the right side and 2+ on the left side.   Pulmonary:      Effort: Pulmonary effort is normal.   Musculoskeletal:         General: Normal range of motion.      Cervical back: Normal range of motion.      Right foot: Deformity present.      Left foot: Deformity present.   Feet:      Right foot:      Skin integrity: Skin integrity normal.      Left foot:      Skin integrity: Skin integrity normal.      Comments: High arches, 2nd toes hammer toes,   monofilament sensation intact top and bottom both feet   Neurological:      General: No focal deficit present.      Mental Status: She is alert and oriented to person, place, and time.   Psychiatric:         Behavior: Behavior normal.         Thought Content: Thought content normal.         Judgment: Judgment normal.             PAST MEDICAL HISTORY     Past Medical History:   Diagnosis Date   • Alpha galactosidase deficiency     red meat allergy; \"alpha gal syndrome\"   • Anemia    • Arthritis    • Asthma    • COPD (chronic obstructive pulmonary disease) ([facility])    • Diabetes mellitus ([facility])    • Endometriosis    • Fibrocystic breast    • Fibromyalgia    • Goiter 2008   • Hyperlipidemia    • Hypertension     takes bp meds to help kidneys   • Neuropathy    • Sleep apnea    • SVT (supraventricular tachycardia) ([facility])    • Tinnitus    • Vitamin D deficiency       PAST SURGICAL HISTORY     Past Surgical History:   Procedure Laterality Date   • CARDIAC ELECTROPHYSIOLOGY PROCEDURE N/A 5/16/2019    Procedure: EP/Ablation for Supraventricular Tachycardia;  Surgeon: Mckay Jiménez MD;  " Location: [facility] INVASIVE LOCATION;  Service: Cardiology   • CARPAL TUNNEL RELEASE Bilateral    • LAMINECTOMY  12/06/2017   • LAPAROSCOPIC TUBAL LIGATION     • TUBAL ABDOMINAL LIGATION        SOCIAL HISTORY     Social History     Socioeconomic History   • Marital status:    Tobacco Use   • Smoking status: Never   • Smokeless tobacco: Never   Substance and Sexual Activity   • Alcohol use: No   • Drug use: No   • Sexual activity: Defer      ALLERGIES   Beef-derived products, Amoxicillin, Grass, Green soap tincture, Promethazine, and Red dye   MEDICATIONS     Current Outpatient Medications   Medication Sig Dispense Refill   • albuterol sulfate  (90 Base) MCG/ACT inhaler Inhale 2 puffs Every 4 (Four) Hours As Needed for Wheezing or Shortness of Air. 18 g 0   • atorvastatin (LIPITOR) 20 MG tablet Take 20 mg by mouth Every Night.     • beclomethasone (QVAR) 40 MCG/ACT inhaler Inhale 2 puffs Every 4 (Four) Hours As Needed.     • dilTIAZem (CARDIZEM) 30 MG tablet TAKE 1 TABLET THREE TIMES A  tablet 3   • EPINEPHrine (EPIPEN JR) 0.15 MG/0.3ML solution auto-injector injection Inject 0.15 mg into the shoulder, thigh, or buttocks As Needed.     • flecainide (TAMBOCOR) 50 MG tablet TAKE 1 TABLET EVERY 12 HOURS 180 tablet 3   • fluticasone (FLONASE) 50 MCG/ACT nasal spray 1 spray into the nostril(s) as directed by provider Daily. 1 bottle 0   • gabapentin (NEURONTIN) 600 MG tablet Take 600 mg by mouth Daily.     • loratadine (CLARITIN) 10 MG tablet Take 1 tablet by mouth Daily. 30 tablet 0   • losartan (COZAAR) 25 MG tablet Take 12.5 mg by mouth Every Night.     • metFORMIN (GLUCOPHAGE) 500 MG tablet Take 500 mg by mouth 2 (Two) Times a Day With Meals.     • oxyCODONE-acetaminophen (PERCOCET)  MG per tablet Take 10 tablets by mouth Daily.     • glucose blood test strip      • ipratropium-albuterol (DUO-NEB) 0.5-2.5 mg/3 ml nebulizer Take 3 mL by nebulization Every 4 (Four) Hours As Needed for  Wheezing. 30 mL 0   • levETIRAcetam (KEPPRA) 750 MG tablet Take 750 mg by mouth 2 (Two) Times a Day.     • pseudoephedrine-guaifenesin (MUCINEX D)  MG per 12 hr tablet Take 1 tablet by mouth Every 12 (Twelve) Hours. 14 tablet 0   • Spacer/Aero-Holding Chambers (AeroChamber MV) inhaler 1 each by Other route Every 4 (Four) Hours As Needed (short of breath). Use as instructed 1 each 0     No current facility-administered medications for this visit.        The following portions of the patient's history were reviewed and updated as appropriate: allergies, current medications, past family history, past medical history, past social history, past surgical history and problem list.        Assessment & Plan   Diagnoses and all orders for this visit:    1. Type 2 diabetes mellitus with diabetic neuropathy, without long-term current use of insulin (HCC) (Primary)  -     Hemoglobin A1c    2. Allergy to alpha-gal  -     Alpha-Gal, IgE, Serum    3. High arches    4. Hammertoes of both feet      rx for diabetic shoes.                No follow-ups on file.                  This document has been electronically signed by Shaq Morrow MD on October 17, 2022 17:08 CDT

## 2022-10-18 LAB — HBA1C MFR BLD: 6.2 % (ref 4.8–5.6)

## 2022-10-26 ENCOUNTER — PATIENT ROUNDING (BHMG ONLY) (OUTPATIENT)
Dept: FAMILY MEDICINE CLINIC | Facility: CLINIC | Age: 62
End: 2022-10-26

## 2022-10-26 LAB — ALPHA-GAL IGE QN: 11.3 KU/L

## 2022-10-26 NOTE — PROGRESS NOTES
October 26, 2022    Hello, may I speak with Brittni Hagen?    My name is Maddie Chowdhury      I am  with [facility] - [place]  [address]  [facility] [address] [phone].    Before we get started may I verify your date of birth? 1960    I am calling to officially welcome you to our practice and ask about your recent visit. Is this a good time to talk? yes    Tell me about your visit with us. What things went well?  Everything went well/ no problems       We're always looking for ways to make our patients' experiences even better. Do you have recommendations on ways we may improve?  no    Overall were you satisfied with your first visit to our practice? yes       I appreciate you taking the time to speak with me today. Is there anything else I can do for you? no      Thank you, and have a great day.

## 2022-10-28 RX ORDER — EPINEPHRINE 0.15 MG/.3ML
0.15 INJECTION INTRAMUSCULAR AS NEEDED
Qty: 2 EACH | Refills: 0 | Status: SHIPPED | OUTPATIENT
Start: 2022-10-28 | End: 2022-10-31

## 2022-10-28 NOTE — ADDENDUM NOTE
Addended by: TRISTAN TILLEY on: 10/28/2022 11:59 AM     Modules accepted: Orders     Per Dr Graf ok to 90 day supply

## 2022-10-31 RX ORDER — EPINEPHRINE 0.3 MG/.3ML
0.3 INJECTION SUBCUTANEOUS ONCE
Qty: 1 EACH | Refills: 0 | Status: SHIPPED | OUTPATIENT
Start: 2022-10-31 | End: 2022-10-31

## 2022-11-01 ENCOUNTER — TELEPHONE (OUTPATIENT)
Dept: FAMILY MEDICINE CLINIC | Facility: CLINIC | Age: 62
End: 2022-11-01

## 2022-11-01 RX ORDER — FAMOTIDINE 40 MG/1
40 TABLET, FILM COATED ORAL DAILY
Qty: 90 TABLET | Refills: 3 | Status: SHIPPED | OUTPATIENT
Start: 2022-11-01

## 2022-11-01 NOTE — TELEPHONE ENCOUNTER
Incoming Refill Request      Medication requested (name and dose): Famotidine (Pepcid) 20 MG    Pharmacy where request should be sent: [facility]    Additional details provided by patient: Patient said 40 MG but I only seen 20 mg on list    Best call back number: [phone]    Does the patient have less than a 3 day supply:  [x] Yes  [] No    Miguel Dickerson Rep  11/01/22, 11:30 CDT

## 2023-02-17 ENCOUNTER — OFFICE VISIT (OUTPATIENT)
Dept: CARDIOLOGY | Facility: CLINIC | Age: 63
End: 2023-02-17
Payer: OTHER GOVERNMENT

## 2023-02-17 VITALS
HEIGHT: 67 IN | SYSTOLIC BLOOD PRESSURE: 124 MMHG | WEIGHT: 169 LBS | DIASTOLIC BLOOD PRESSURE: 68 MMHG | BODY MASS INDEX: 26.53 KG/M2 | HEART RATE: 88 BPM | OXYGEN SATURATION: 94 %

## 2023-02-17 DIAGNOSIS — I47.1 PAROXYSMAL SVT (SUPRAVENTRICULAR TACHYCARDIA): ICD-10-CM

## 2023-02-17 DIAGNOSIS — E78.00 PURE HYPERCHOLESTEROLEMIA: ICD-10-CM

## 2023-02-17 DIAGNOSIS — I10 ESSENTIAL HYPERTENSION: Primary | ICD-10-CM

## 2023-02-17 PROBLEM — R00.2 PALPITATIONS: Status: RESOLVED | Noted: 2019-04-09 | Resolved: 2023-02-17

## 2023-02-17 LAB
QT INTERVAL: 374 MS
QTC INTERVAL: 452 MS

## 2023-02-17 PROCEDURE — 99214 OFFICE O/P EST MOD 30 MIN: CPT | Performed by: INTERNAL MEDICINE

## 2023-02-17 PROCEDURE — 93000 ELECTROCARDIOGRAM COMPLETE: CPT | Performed by: INTERNAL MEDICINE

## 2023-02-17 NOTE — PROGRESS NOTES
Brittni Hagen  62 y.o. female    2/17/2023     1. Essential hypertension    2. Paroxysmal SVT (supraventricular tachycardia) (HCC)    3. Pure hypercholesterolemia    4       preop evaluation waiting for back surgery at [facility]    History of Present Illness:    Body mass index is 26.47 kg/m². BMI is above normal parameters. Recommendations include: exercise counseling, nutrition counseling and referral to primary care.    62 years old patient who presented today for routine follow-up and have longstanding history of chronic back pain referred by pain clinic to [facility] for back surgery.  She remained physically active had a previous normal stress test.  She denies any chest pain orthopnea PND lightheaded dizziness or palpitations.  She is a pleased with her clinical outcome.  Patient had concurrent medical problems supraventricular tachycardia inducible nas terminalis tachycardia had phrenic nerve stimulation at the site careful less than 1520-second radiofrequency energy was delivered and tachycardia become noninducible.  She subsequent had nonsustained tachycardia started on flecainide with significant improvement.  Her concurrent medical problem is diabetes hypertension .   Had previous bnormal stress test in 2019 and preserved left ventricle systolic function in 2021.      Echo March 2021    • Estimated left ventricular EF = 61% Left ventricular ejection fraction appears to be 61 - 65%. Left ventricular systolic function is normal.  • Left ventricular diastolic function was normal.  • The mitral valve is grossly normal in structure. Mild mitral valve regurgitation is present.    · Stress test 4/3/2020 2019         · patient developed supraventricular tachycardia with diffuse nonspecific ST-T changes with a heart rate of up to 181 bpm during recovery which lasted for a couple of minutes after which the patient returned to sinus rhythm.  · Baseline EKG showed sinus rhythm with incomplete right  "bundle branch block.  · Left ventricular ejection fraction is hyperdynamic (Calculated EF > 70%).  · Myocardial perfusion imaging indicates a normal myocardial perfusion study with no evidence of ischemia.  · Impressions are consistent with a low risk study.      Lipid 1/26/2027    Ref Range & Units 4yr ago   Total Cholesterol   0 - 199 mg/dL 174    Triglycerides   20 - 199 mg/dL 124    HDL Cholesterol   60 - 200 mg/dL 61    LDL Cholesterol    1 - 129 mg/dL 87    LDL/HDL Ratio   0.00 - 3.22 1.45            SUBJECTIVE:    Allergies   Allergen Reactions   • Beef-Derived Products Hives     Red meats, Alpha-gal Syndrome    • Amoxicillin Hives   • Grass Hives   • Green Soap Tincture Itching   • Promethazine      Gave her the shakes   • Red Dye Itching         Past Medical History:   Diagnosis Date   • Alpha galactosidase deficiency     red meat allergy; \"alpha gal syndrome\"   • Anemia    • Arthritis    • Asthma    • COPD (chronic obstructive pulmonary disease) ([facility])    • Diabetes mellitus ([facility])    • Endometriosis    • Fibrocystic breast    • Fibromyalgia    • Goiter 2008   • Hyperlipidemia    • Hypertension     takes bp meds to help kidneys   • Neuropathy    • Sleep apnea    • SVT (supraventricular tachycardia) ([facility])    • Tinnitus    • Vitamin D deficiency          Past Surgical History:   Procedure Laterality Date   • CARDIAC ELECTROPHYSIOLOGY PROCEDURE N/A 5/16/2019    Procedure: EP/Ablation for Supraventricular Tachycardia;  Surgeon: Mckay Jiménez MD;  Location: [facility] INVASIVE LOCATION;  Service: Cardiology   • CARPAL TUNNEL RELEASE Bilateral    • LAMINECTOMY  12/06/2017   • LAPAROSCOPIC TUBAL LIGATION     • TUBAL ABDOMINAL LIGATION           Family History   Problem Relation Age of Onset   • Heart disease Mother    • HIV Mother    • Heart attack Father    • Scoliosis Sister    • Rheum arthritis Sister    • Heart disease Maternal Aunt    • Schizophrenia Son    • Diabetes Daughter    • Diverticulitis Daughter  "         Social History     Socioeconomic History   • Marital status:    Tobacco Use   • Smoking status: Never   • Smokeless tobacco: Never   Substance and Sexual Activity   • Alcohol use: No   • Drug use: No   • Sexual activity: Defer         Current Outpatient Medications   Medication Sig Dispense Refill   • albuterol sulfate  (90 Base) MCG/ACT inhaler Inhale 2 puffs Every 4 (Four) Hours As Needed for Wheezing or Shortness of Air. 18 g 0   • atorvastatin (LIPITOR) 20 MG tablet Take 20 mg by mouth Every Night.     • beclomethasone (QVAR) 40 MCG/ACT inhaler Inhale 2 puffs Every 4 (Four) Hours As Needed.     • dilTIAZem (CARDIZEM) 30 MG tablet TAKE 1 TABLET THREE TIMES A  tablet 3   • famotidine (Pepcid) 40 MG tablet Take 1 tablet by mouth Daily. 90 tablet 3   • flecainide (TAMBOCOR) 50 MG tablet TAKE 1 TABLET EVERY 12 HOURS 180 tablet 3   • fluticasone (FLONASE) 50 MCG/ACT nasal spray 1 spray into the nostril(s) as directed by provider Daily. 1 bottle 0   • gabapentin (NEURONTIN) 600 MG tablet Take 600 mg by mouth Daily.     • glucose blood test strip      • ipratropium-albuterol (DUO-NEB) 0.5-2.5 mg/3 ml nebulizer Take 3 mL by nebulization Every 4 (Four) Hours As Needed for Wheezing. 30 mL 0   • loratadine (CLARITIN) 10 MG tablet Take 1 tablet by mouth Daily. 30 tablet 0   • losartan (COZAAR) 25 MG tablet Take 12.5 mg by mouth Every Night.     • metFORMIN (GLUCOPHAGE) 500 MG tablet Take 500 mg by mouth 2 (Two) Times a Day With Meals.     • oxyCODONE-acetaminophen (PERCOCET)  MG per tablet Take 10 tablets by mouth Daily.     • Spacer/Aero-Holding Chambers (AeroChamber MV) inhaler 1 each by Other route Every 4 (Four) Hours As Needed (short of breath). Use as instructed 1 each 0   • pseudoephedrine-guaifenesin (MUCINEX D)  MG per 12 hr tablet Take 1 tablet by mouth Every 12 (Twelve) Hours. 14 tablet 0     No current facility-administered medications for this visit.           Review of  "Systems:     No change in the review of system  Constitutional well built no apparent distress  HEENT no postnasal dripping no sore throat or symptom related to urinary reported    Pulmonary no cough, wheezing, or hemoptysis.     Cardiovascular: See H&P    Gastrointestinal:  Denies change in bowel habits, dyspepsia,     Endocrine: Negative for cold intolerance, heat intolerance, polydipsia, polyphagia and polyuria.    Genitourinary: Negative.      Musculoskeletal: Chronic back pain    Skin:  Denies   rashes, or skin lesions.     Allergic/Immunologic: Negative.  Negative for environmental allergies,    Neurological:  Denies  , strokes, TIA. Positive for history of seizure disorder    Hematological: Denies any food allergies, seasonal allergies    Psychiatric/Behavioral: Denies any history of depression,      OBJECTIVE:    /68 (BP Location: Left arm, Patient Position: Sitting, Cuff Size: Adult)   Pulse 88   Ht 170.2 cm (67\")   Wt 76.7 kg (169 lb)   SpO2 94%   BMI 26.47 kg/m²      no significant change was noted in the physical exam compared to previous       physical Exam:     Constitutional: Cooperative, alert and oriented, well-developed, well-nourished, in no acute distress.     HENT:   Head: Normocephalic, atraumatic, conjunctive is pink, thyroid is nonpalpable no jugular is distention oral mucosa is moist    Cardiovascular: Regular rhythm, S1 and S2 normal, no S3 or S4. Apical impulse not displaced. No murmurs    Pulmonary/Chest: Chest: Good bilateral air entry no rales or wheezing    Abdominal: Abdomen soft, bowel sounds normoactive,     Musculoskeletal: No deformities, positive peripheral pulses no edema    Neurological: No gross motor or sensory deficits noted,    Skin: Warm and dry to the touch, no apparent skin lesions     Psychiatric: She has a normal mood and affect. Her behavior is normal.         Procedures      Lab Results   Component Value Date    WBC 6.92 05/30/2022    HGB 14.1 05/30/2022 "    HCT 42.7 05/30/2022    MCV 91.0 05/30/2022     05/30/2022     Lab Results   Component Value Date    GLUCOSE 82 08/18/2022    BUN 10 08/18/2022    CREATININE 0.84 08/18/2022    EGFRIFNONA 64 03/02/2020    BCR 11.9 08/18/2022    CO2 19.0 (L) 08/18/2022    CALCIUM 9.5 08/18/2022    ALBUMIN 3.80 08/18/2022    AST 16 05/30/2022    ALT 14 05/30/2022     Lab Results   Component Value Date    CHOL 174 01/26/2017     Lab Results   Component Value Date    TRIG 124 01/26/2017     Lab Results   Component Value Date    HDL 61 01/26/2017     No components found for: LDLCALC  Lab Results   Component Value Date    LDL 87 01/26/2017     No results found for: HDLLDLRATIO  No components found for: CHOLHDL  Lab Results   Component Value Date    HGBA1C 6.20 (H) 10/17/2022     Lab Results   Component Value Date    TSH 0.900 12/10/2017    D0WSRIZ 9.46 12/10/2017           ASSESSMENT AND PLAN:  #1 supraventricular tachycardia     No further recurrence of tachycardia palpitations she is tolerating flecainide very well EKG sinus rhythm with a normal interval       #2 hypertension     Good blood pressure will continue diltiazem, losartan.  Significantly low carbohydrate, low-fat, DASH diet graded exercise discussed with the patient's.    Hyperlipidemia continue atorvastatin     #4 preop evaluation    62 years old patient with chronic back pain and waiting for back surgery at [place].  Patient previously abnormal stress test and preserved left ventricle systolic function.  She remained physically active.  Given the asymptomatic status.  Normal stress test preserved left ventricle systolic function no further restratification needed.  She is a moderate risk for the proposed procedure.    I spent 27 minutes caring for Brittni on this date of service. This time includes time spent by me of counseling/coordination of care as relates to the presenting problem and any ordered procedures/tests as outlined above.         This document has  been electronically signed by Mckay Jiménez MD on February 17, 2023 11:19 CST            Diagnoses and all orders for this visit:    1. Essential hypertension (Primary)  -     ECG 12 Lead    2. Paroxysmal SVT (supraventricular tachycardia) (HCC)    3. Pure hypercholesterolemia        Mckay Jiménez MD  2/17/2023  11:19 CST

## 2023-03-29 ENCOUNTER — TELEPHONE (OUTPATIENT)
Dept: CARDIOLOGY | Facility: CLINIC | Age: 63
End: 2023-03-29
Payer: OTHER GOVERNMENT

## 2023-03-29 RX ORDER — FLECAINIDE ACETATE 50 MG/1
50 TABLET ORAL EVERY 12 HOURS
Qty: 60 TABLET | Refills: 0 | Status: SHIPPED | OUTPATIENT
Start: 2023-03-29

## 2023-03-29 NOTE — TELEPHONE ENCOUNTER
----- Message from Ivette Maria T sent at 3/29/2023 10:00 AM CDT -----  Contact: [phone]  She is out of town and is out of her Flecanide 50 mg. Can you send into [address]  [phone]

## 2023-05-01 ENCOUNTER — TELEPHONE (OUTPATIENT)
Dept: CARDIOLOGY | Facility: CLINIC | Age: 63
End: 2023-05-01
Payer: OTHER GOVERNMENT

## 2023-05-01 RX ORDER — FLECAINIDE ACETATE 50 MG/1
50 TABLET ORAL EVERY 12 HOURS
Qty: 180 TABLET | Refills: 3 | Status: SHIPPED | OUTPATIENT
Start: 2023-05-01

## 2023-05-01 RX ORDER — FAMOTIDINE 40 MG/1
40 TABLET, FILM COATED ORAL DAILY
Qty: 90 TABLET | Refills: 3 | Status: SHIPPED | OUTPATIENT
Start: 2023-05-01

## 2023-05-01 NOTE — TELEPHONE ENCOUNTER
----- Message from Miguel Higgins sent at 2023 11:40 AM CDT -----  Regarding: med refill  Brittni gunderson 64 needs refill on Diltlazam 30mg, Famotidine 50 mg sent to [facility]

## 2023-07-28 ENCOUNTER — OFFICE VISIT (OUTPATIENT)
Dept: FAMILY MEDICINE CLINIC | Facility: CLINIC | Age: 63
End: 2023-07-28
Payer: OTHER GOVERNMENT

## 2023-07-28 VITALS
TEMPERATURE: 98.5 F | SYSTOLIC BLOOD PRESSURE: 121 MMHG | HEART RATE: 80 BPM | OXYGEN SATURATION: 97 % | DIASTOLIC BLOOD PRESSURE: 82 MMHG

## 2023-07-28 DIAGNOSIS — M79.672 ACUTE FOOT PAIN, LEFT: Primary | ICD-10-CM

## 2023-07-28 DIAGNOSIS — R26.89 BALANCE PROBLEM: ICD-10-CM

## 2023-07-28 DIAGNOSIS — M25.572 ACUTE LEFT ANKLE PAIN: ICD-10-CM

## 2023-07-28 PROCEDURE — 99213 OFFICE O/P EST LOW 20 MIN: CPT | Performed by: NURSE PRACTITIONER

## 2023-07-28 RX ORDER — ACETAMINOPHEN 325 MG/1
650 TABLET ORAL EVERY 6 HOURS PRN
Qty: 80 TABLET | Refills: 0 | Status: SHIPPED | OUTPATIENT
Start: 2023-07-28

## 2023-07-28 NOTE — PROGRESS NOTES
"Subjective   Brittni Hagen is a 62 y.o. female. Foot pain    History of Present Illness   Patient presents today for foot pain. Pain started yesterday. She says she has pain \"on the top and bottom of my foot\". She reports walking a lot. No recent injuries. Reports injuring her left ankle several months ago but did not have it evaluated. Wears sandals mostly. She reports trouble with her balance and would like walking cane to help.    The following portions of the patient's history were reviewed and updated as appropriate: allergies, current medications, past family history, past medical history, past social history, past surgical history, and problem list.    Review of Systems   Constitutional:  Negative for chills, fatigue and fever.   HENT:  Negative for congestion, ear pain, rhinorrhea and sore throat.    Eyes:  Negative for blurred vision, double vision and visual disturbance.   Respiratory:  Negative for cough, chest tightness, shortness of breath and wheezing.    Cardiovascular:  Negative for chest pain, palpitations and leg swelling.   Gastrointestinal:  Negative for abdominal pain, diarrhea, nausea and vomiting.   Endocrine: Negative for cold intolerance and heat intolerance.   Genitourinary:  Negative for difficulty urinating, dysuria, frequency and hematuria.   Musculoskeletal:  Positive for arthralgias. Negative for back pain, neck pain and neck stiffness.   Skin:  Negative for dry skin, pallor, rash, skin lesions and wound.   Allergic/Immunologic: Negative for environmental allergies, food allergies and immunocompromised state.   Neurological:  Negative for dizziness, syncope, weakness, light-headedness, headache and confusion.   Hematological:  Negative for adenopathy. Does not bruise/bleed easily.   Psychiatric/Behavioral:  Negative for self-injury, sleep disturbance, suicidal ideas, depressed mood and stress. The patient is not nervous/anxious.      Vitals:    07/28/23 1107   BP: 121/82 "   Pulse: 80   Temp: 98.5 °F (36.9 °C)   SpO2: 97%     There is no height or weight on file to calculate BMI.    Objective   Physical Exam  Constitutional:       General: She is not in acute distress.     Appearance: She is well-developed. She is not ill-appearing or diaphoretic.   HENT:      Head: Normocephalic.   Eyes:      General: Lids are normal.      Conjunctiva/sclera: Conjunctivae normal.      Pupils: Pupils are equal, round, and reactive to light.   Musculoskeletal:         General: Normal range of motion.      Cervical back: Full passive range of motion without pain, normal range of motion and neck supple.      Left ankle: Tenderness present over the medial malleolus.        Feet:    Feet:      Right foot:      Skin integrity: Skin integrity normal.      Left foot:      Skin integrity: Skin integrity normal.   Skin:     General: Skin is warm and dry.      Coloration: Skin is not pale.   Neurological:      Mental Status: She is alert and oriented to person, place, and time.      Coordination: Coordination normal.      Gait: Gait normal.   Psychiatric:         Speech: Speech normal.         Behavior: Behavior normal. Behavior is cooperative.         Thought Content: Thought content normal.         Judgment: Judgment normal.       Assessment & Plan   Diagnoses and all orders for this visit:    1. Acute foot pain, left (Primary)  -     XR Foot 3+ View Left (In Office)  -     acetaminophen (Tylenol) 325 MG tablet; Take 2 tablets by mouth Every 6 (Six) Hours As Needed for Mild Pain.  Dispense: 80 tablet; Refill: 0    2. Acute left ankle pain  -     XR Ankle 3+ View Left  -     acetaminophen (Tylenol) 325 MG tablet; Take 2 tablets by mouth Every 6 (Six) Hours As Needed for Mild Pain.  Dispense: 80 tablet; Refill: 0    3. Balance problem      Xray's ordered to further evaluate left foot/ankle pain.  Recommended investing in supportive tennis shoes with custom insoles. She has high arch and wear flat sandals  mostly.  Rest, ice, elevate.  Take tylenol as directed PRN for pain.  Rx for 3 point walking cane faxed to [facility].  If symptoms do not improve or worsen, patient was instructed to return to clinic for further evaluation.         This document has been electronically signed by ZEYNEP rCowley on  July 28, 2023 15:25 CDT

## 2023-08-01 ENCOUNTER — TELEPHONE (OUTPATIENT)
Dept: FAMILY MEDICINE CLINIC | Facility: CLINIC | Age: 63
End: 2023-08-01
Payer: OTHER GOVERNMENT

## 2023-08-01 DIAGNOSIS — M25.572 ACUTE LEFT ANKLE PAIN: ICD-10-CM

## 2023-08-01 DIAGNOSIS — M79.672 ACUTE FOOT PAIN, LEFT: Primary | ICD-10-CM

## 2023-08-17 ENCOUNTER — OFFICE VISIT (OUTPATIENT)
Dept: PODIATRY | Facility: CLINIC | Age: 63
End: 2023-08-17
Payer: COMMERCIAL

## 2023-08-17 VITALS
SYSTOLIC BLOOD PRESSURE: 138 MMHG | HEART RATE: 70 BPM | BODY MASS INDEX: 26.53 KG/M2 | OXYGEN SATURATION: 98 % | WEIGHT: 169 LBS | HEIGHT: 67 IN | DIASTOLIC BLOOD PRESSURE: 93 MMHG

## 2023-08-17 DIAGNOSIS — G89.29 CHRONIC PAIN OF LEFT ANKLE: ICD-10-CM

## 2023-08-17 DIAGNOSIS — M79.672 LEFT FOOT PAIN: Primary | ICD-10-CM

## 2023-08-17 DIAGNOSIS — M25.572 CHRONIC PAIN OF LEFT ANKLE: ICD-10-CM

## 2023-08-17 RX ORDER — IBUPROFEN 600 MG/1
600 TABLET ORAL EVERY 6 HOURS PRN
Qty: 30 TABLET | Refills: 1 | Status: SHIPPED | OUTPATIENT
Start: 2023-08-17

## 2023-08-17 NOTE — PROGRESS NOTES
"Brittni Hagen  1960  62 y.o. female  PCP: Nevaeh Condon: Has upcoming appointment.  BS: 130 per pt     08/17/2023    Chief Complaint   Patient presents with    Left Foot - Pain    Left Ankle - Pain       History of Present Illness    Brittni Hagen is a 62 y.o.female presents to the clinic today with chief complaint of left foot and ankle pain. Patient states she stepped in a hole in December of 2022 and pain has been ongoing ever since. No alleviating factors. Pt believes neuropathy aggravates pain and reports she is on PO Gabapentin TID.      Past Medical History:   Diagnosis Date    Alpha galactosidase deficiency     red meat allergy; \"alpha gal syndrome\"    Anemia     Arthritis     Asthma     COPD (chronic obstructive pulmonary disease)     Diabetes mellitus     Endometriosis     Fibrocystic breast     Fibromyalgia     Goiter 2008    Hyperlipidemia     Hypertension     takes bp meds to help kidneys    Neuropathy     Sleep apnea     SVT (supraventricular tachycardia)     Tinnitus     Vitamin D deficiency          Past Surgical History:   Procedure Laterality Date    CARDIAC ELECTROPHYSIOLOGY PROCEDURE N/A 5/16/2019    Procedure: EP/Ablation for Supraventricular Tachycardia;  Surgeon: Mckay Jiménez MD;  Location: [facility] INVASIVE LOCATION;  Service: Cardiology    CARPAL TUNNEL RELEASE Bilateral     LAMINECTOMY  12/06/2017    LAPAROSCOPIC TUBAL LIGATION      TUBAL ABDOMINAL LIGATION           Family History   Problem Relation Age of Onset    Heart disease Mother     HIV Mother     Heart attack Father     Scoliosis Sister     Rheum arthritis Sister     Heart disease Maternal Aunt     Schizophrenia Son     Diabetes Daughter     Diverticulitis Daughter        Allergies   Allergen Reactions    Beef-Derived Products Hives     Red meats, Alpha-gal Syndrome     Amoxicillin Hives    Grass Hives    Green Soap Tincture Itching    Promethazine      Gave her the shakes    Red Dye Itching    " Naproxen Rash       Social History     Socioeconomic History    Marital status:    Tobacco Use    Smoking status: Never    Smokeless tobacco: Never   Substance and Sexual Activity    Alcohol use: No    Drug use: No    Sexual activity: Defer         Current Outpatient Medications   Medication Sig Dispense Refill    albuterol sulfate  (90 Base) MCG/ACT inhaler Inhale 2 puffs Every 4 (Four) Hours As Needed for Wheezing or Shortness of Air. 18 g 0    atorvastatin (LIPITOR) 20 MG tablet Take 1 tablet by mouth Every Night.      beclomethasone (QVAR) 40 MCG/ACT inhaler Inhale 2 puffs Every 4 (Four) Hours As Needed.      dilTIAZem (CARDIZEM) 30 MG tablet Take 1 tablet by mouth 3 (Three) Times a Day. 270 tablet 3    famotidine (Pepcid) 40 MG tablet Take 1 tablet by mouth Daily. 90 tablet 3    flecainide (TAMBOCOR) 50 MG tablet Take 1 tablet by mouth Every 12 (Twelve) Hours. 180 tablet 3    fluticasone (FLONASE) 50 MCG/ACT nasal spray 1 spray into the nostril(s) as directed by provider Daily. 1 bottle 0    gabapentin (NEURONTIN) 600 MG tablet Take 1 tablet by mouth Daily.      glucose blood test strip       ipratropium-albuterol (DUO-NEB) 0.5-2.5 mg/3 ml nebulizer Take 3 mL by nebulization Every 4 (Four) Hours As Needed for Wheezing. 30 mL 0    loratadine (CLARITIN) 10 MG tablet Take 1 tablet by mouth Daily. 30 tablet 0    losartan (COZAAR) 25 MG tablet Take 0.5 tablets by mouth Every Night.      metFORMIN (GLUCOPHAGE) 500 MG tablet Take 1 tablet by mouth 2 (Two) Times a Day With Meals.      oxyCODONE-acetaminophen (PERCOCET)  MG per tablet Take 10 tablets by mouth Daily.      acetaminophen (Tylenol) 325 MG tablet Take 2 tablets by mouth Every 6 (Six) Hours As Needed for Mild Pain. 80 tablet 0    ibuprofen (ADVIL,MOTRIN) 600 MG tablet Take 1 tablet by mouth Every 6 (Six) Hours As Needed for Mild Pain. 30 tablet 1     No current facility-administered medications for this visit.       Review of Systems  "  Constitutional: Negative.    Respiratory: Negative.     Cardiovascular: Negative.    Musculoskeletal:         Foot/ankle pain   Skin: Negative.    Neurological: Negative.    Hematological: Negative.    Psychiatric/Behavioral: Negative.         OBJECTIVE    /93   Pulse 70   Ht 170.2 cm (67\")   Wt 76.7 kg (169 lb)   LMP  (LMP Unknown)   SpO2 98%   BMI 26.47 kg/mý     Physical Exam  Vitals reviewed.   Constitutional:       General: She is not in acute distress.     Appearance: Normal appearance.   HENT:      Head: Normocephalic.   Eyes:      Pupils: Pupils are equal, round, and reactive to light.   Cardiovascular:      Pulses:           Dorsalis pedis pulses are 1+ on the left side.        Posterior tibial pulses are 1+ on the left side.   Pulmonary:      Effort: No respiratory distress.   Musculoskeletal:         General: No signs of injury.      Cervical back: Neck supple.      Left foot: Decreased range of motion. No deformity.        Feet:    Skin:     General: Skin is warm and dry.      Findings: No erythema.   Neurological:      General: No focal deficit present.      Mental Status: She is alert.   Psychiatric:         Mood and Affect: Mood normal.         Behavior: Behavior normal.            Procedures        ASSESSMENT AND PLAN    Diagnoses and all orders for this visit:    1. Left foot pain (Primary)  -     XR Foot 3+ View Left  -     XR Ankle 3+ View Left  -     ibuprofen (ADVIL,MOTRIN) 600 MG tablet; Take 1 tablet by mouth Every 6 (Six) Hours As Needed for Mild Pain.  Dispense: 30 tablet; Refill: 1  -     Ambulatory Referral to Physical Therapy Evaluate and treat; Stretching, ROM, Strengthening    2. Chronic pain of left ankle  -     XR Foot 3+ View Left  -     XR Ankle 3+ View Left  -     ibuprofen (ADVIL,MOTRIN) 600 MG tablet; Take 1 tablet by mouth Every 6 (Six) Hours As Needed for Mild Pain.  Dispense: 30 tablet; Refill: 1  -     Ambulatory Referral to Physical Therapy Evaluate and treat; " Stretching, ROM, Strengthening        - Comprehensive foot and ankle exam performed  - X-rays reviewed.  No acute osseous or articular abnormalities.  - Recommending supportive footgear.  - RX ibuprofen, pt states she has tolerated ibuprofen in the past.  - Referral to PT.  - Ankle brace dispensed.  - Patient advised to stretch, ice and to make appropriate shoe gear changes to include wearing athletic type shoes with supportive insoles. Patient was given written instructions on how to correctly perform the stretching of the Achilles tendon/calf stretches to decrease forefoot pressures. Limit bare foot walking.    - Recommended over-the-counter insole such as power steps plus to properly support the arch in order to alleviate the tension and stress on the metatarsals associated with normal daily walking. Patient was educated on the break-in period for new arch supports.  - All questions were answered to the patient's satisfaction.  - RTC 6-8 weeks, sooner if needed.            This document has been electronically signed by ZEYNEP Hartley on August 17, 2023 10:18 CDT

## 2023-08-22 RX ORDER — FLECAINIDE ACETATE 50 MG/1
50 TABLET ORAL EVERY 12 HOURS
Qty: 180 TABLET | Refills: 3 | Status: SHIPPED | OUTPATIENT
Start: 2023-08-22

## 2023-08-23 ENCOUNTER — HOSPITAL ENCOUNTER (OUTPATIENT)
Dept: PHYSICAL THERAPY | Facility: HOSPITAL | Age: 63
Setting detail: THERAPIES SERIES
Discharge: HOME | End: 2023-08-23
Payer: COMMERCIAL

## 2023-08-23 DIAGNOSIS — M25.572 LEFT ANKLE PAIN, UNSPECIFIED CHRONICITY: Primary | ICD-10-CM

## 2023-08-23 PROCEDURE — 97110 THERAPEUTIC EXERCISES: CPT | Performed by: PHYSICAL THERAPIST

## 2023-08-23 PROCEDURE — 97162 PT EVAL MOD COMPLEX 30 MIN: CPT | Performed by: PHYSICAL THERAPIST

## 2023-08-23 NOTE — THERAPY EVALUATION
"  Outpatient Physical Therapy Ortho Initial Evaluation  [facility]     Patient Name: Brittni Hagen  : 1960  MRN: [id number]  Today's Date: 2023      Visit Date: 2023  Attendance:   (20 approved)  Subjective % Improvement: n/a  Recert Date: 23  MD appointment: tbd    Therapy Diagnosis: left ankle pain    Patient Active Problem List   Diagnosis    Type 2 diabetes mellitus without complication, without long-term current use of insulin    Hyperlipidemia    Essential hypertension    Gastroesophageal reflux disease without esophagitis    Paroxysmal SVT (supraventricular tachycardia)    SOB (shortness of breath)    COPD (chronic obstructive pulmonary disease)        Past Medical History:   Diagnosis Date    Alpha galactosidase deficiency     red meat allergy; \"alpha gal syndrome\"    Anemia     Arthritis     Asthma     COPD (chronic obstructive pulmonary disease)     Diabetes mellitus     Endometriosis     Fibrocystic breast     Fibromyalgia     Goiter     Hyperlipidemia     Hypertension     takes bp meds to help kidneys    Neuropathy     Sleep apnea     SVT (supraventricular tachycardia)     Tinnitus     Vitamin D deficiency         Past Surgical History:   Procedure Laterality Date    CARDIAC ELECTROPHYSIOLOGY PROCEDURE N/A 2019    Procedure: EP/Ablation for Supraventricular Tachycardia;  Surgeon: Mckay Jiménez MD;  Location: [facility] INVASIVE LOCATION;  Service: Cardiology    CARPAL TUNNEL RELEASE Bilateral     LAMINECTOMY  2017    LAPAROSCOPIC TUBAL LIGATION      TUBAL ABDOMINAL LIGATION         Visit Dx:     ICD-10-CM ICD-9-CM   1. Left ankle pain, unspecified chronicity  M25.572 719.47          Patient History       Row Name 23 0840             History    Date Current Problem(s) Began --  2022  -BB      Brief Description of Current Complaint Present today with reports of rolling ankle in hole in December and then about a month ago stepped " funny again and increased symptoms. Reports ankle swells and hurts. In December had brusing and swelling. No therapy thus far. Denies breaks in xray. Reports pain is all over. Has hx of back sx. has neuropathy from back condition and DM. Reports pain feels like it shoots up leg. Reports unable to wear tennis shoes due to the pain they cause on foot  -BB      Patient/Caregiver Goals Relieve pain  -BB      Hand Dominance right-handed  -BB      Occupation/sports/leisure activities n/a- retired  -BB         Pain     Pain Location Ankle  -BB      Pain at Present 9  -BB      Pain at Worst 3  -BB      Pain Description Sharp;Shooting  -BB      Is your sleep disturbed? Yes  -BB         Fall Risk Assessment    Any falls in the past year: Yes  -BB      Number of falls reported in the last 12 months 2  -BB                User Key  (r) = Recorded By, (t) = Taken By, (c) = Cosigned By      Initials Name Provider Type    BB Rachel Augustin, PT DPT Physical Therapist                     PT Ortho       Row Name 08/23/23 0840       Subjective Comments    Subjective Comments see pt hx  -BB       Precautions and Contraindications    Precautions hx of lumbar sx  -BB       Subjective Pain    Able to rate subjective pain? yes  -BB    Pre-Treatment Pain Level 9  -BB       Posture/Observations    Posture/Observations Comments bilateral PF first ray, pes cavus foot type.  -BB       Special Tests/Palpation    Special Tests/Palpation Ankle/Foot  -BB       Foot/Ankle Palpation    Foot/Ankle Palpation? Yes  -BB    Lateral Malleolus Tender  -BB    Plantar Fascia Left:;Tender;Guarded/taut  -BB    Peroneals Tender;Left:;Guarded/taut;Trigger point  -BB       General ROM    GENERAL ROM COMMENTS DF CKC: 15, ev: 12, inv: 18 pain with ev/inv  -BB       MMT (Manual Muscle Testing)    General MMT Comments DF: 4+/5 , ev/inv: 4/5, PF: 4/5 all with pain and guarded motion  -BB       Sensation    Additional Comments Reports sensation distal to thigh to crude  "touch but reports is decreased signifcantly  -BB       Girth    Girth Measured? Ankle  -BB       Ankle Girth    Figure 8- Left 46.75  -BB    Mid Tarsal- Left 22  -BB    MTP Joints- Left 20.5  -BB    Above Malleolus- Left 20  -BB    Mid Gastroc- Left 37.5  -BB       Balance Skills Training    Balance Comments deferred formal training this date  -BB       Gait/Stairs (Locomotion)    Comment, (Gait/Stairs) mid stance antalgics with ASO and cane this date.  -BB              User Key  (r) = Recorded By, (t) = Taken By, (c) = Cosigned By      Initials Name Provider Type    Rachel Molina, PT DPT Physical Therapist                                Therapy Education  Education Details: ABCs/ AROM all plane      PT OP Goals       Row Name 08/23/23 0840          Long Term Goals    LTG Date to Achieve 10/04/23  -BB     LTG 1 Complete a 6' walk test without increased antalgics or pain  -BB     LTG 2 Ankle MMT all planes 5/5  -BB     LTG 3 SLS on LLE 5\" safely  -BB        Time Calculation    PT Goal Re-Cert Due Date 09/13/23  -BB               User Key  (r) = Recorded By, (t) = Taken By, (c) = Cosigned By      Initials Name Provider Type    Rachel Molina, PT DPT Physical Therapist                     PT Assessment/Plan       Row Name 08/23/23 0840          PT Assessment    Functional Limitations Impaired gait;Decreased safety during functional activities;Limitations in functional capacity and performance;Impaired locomotion;Performance in leisure activities  -BB     Impairments Balance;Gait;Pain;Muscle strength;Poor body mechanics;Range of motion;Impaired muscle endurance  -BB     Assessment Comments Patient presents today with left ankle pain after several instances of rolling/stepping poorly on left ankle. Patients ROM is functional but painful. Is noted to have pain with MMT and pain with palpation of the ankle evertors and invertors with specific pain noted of the peroneal longus and brevis muscle bellies. Patient " "trialed fluido with AROM this date with reports of improved pain. Patient could continue to benefit from PT to improve pain and functional strength to restore normal gait and gait balance  -BB     Rehab Potential Good  -BB     Patient/caregiver participated in establishment of treatment plan and goals Yes  -BB     Patient would benefit from skilled therapy intervention Yes  -BB        PT Plan    PT Frequency 2x/week  -BB     Predicted Duration of Therapy Intervention (PT) 6 weeks  -BB     Planned CPT's? PT THER SUPP EA 15 MIN;PT EVAL MOD COMPLELITY: 95465;PT RE-EVAL: 91874;PT THER PROC EA 15 MIN: 12949;PT THER ACT EA 15 MIN: 29907;PT MANUAL THERAPY EA 15 MIN: 11271;PT SELF CARE/HOME MGMT/TRAIN EA 15: 27408;PT AQUATIC THERAPY EA 15 MIN: 10368;PT GAIT TRAINING EA 15 MIN: 98715;PT NEUROMUSC RE-EDUCATION EA 15 MIN: 06901  -BB     PT Plan Comments land and pool for ROM, gait, strength, manual to improve tissue mobility, balance, desensitization  -BB               User Key  (r) = Recorded By, (t) = Taken By, (c) = Cosigned By      Initials Name Provider Type    Rachel Molina PT DPT Physical Therapist                       OP Exercises       Row Name 08/23/23 0840             Subjective Comments    Subjective Comments see pt hx  -BB         Subjective Pain    Able to rate subjective pain? yes  -BB      Pre-Treatment Pain Level 9  -BB      Post-Treatment Pain Level --  5-6/10  -BB         Exercise 1    Exercise Name 1 eval/poc  -BB         Exercise 2    Exercise Name 2 ankle ROM and ABCs for HEP  -BB         Exercise 3    Exercise Name 3 Fluido with AROM  -BB      Time 3 8'  -BB         Exercise 4    Exercise Name 4 Gastroc stretch  -BB      Sets 4 1  -BB      Time 4 30\"  -BB      Additional Comments for HEP  -BB                User Key  (r) = Recorded By, (t) = Taken By, (c) = Cosigned By      Initials Name Provider Type    Rachel Molina PT DPT Physical Therapist                                            Time " Calculation:     Start Time: 0840  Stop Time: 0930  Time Calculation (min): 50 min     Therapy Charges for Today       Code Description Service Date Service Provider Modifiers Qty    63833053584 HC PT EVAL MOD COMPLEXITY 2 8/23/2023 Rachel Augsutin, PT DPT GP 1    54318246930  PT THER PROC EA 15 MIN 8/23/2023 Rachel Augustin, PT DPT GP 1                      Rachel Augustin, PT DPT  8/23/2023

## 2023-08-25 ENCOUNTER — HOSPITAL ENCOUNTER (OUTPATIENT)
Dept: PHYSICAL THERAPY | Facility: HOSPITAL | Age: 63
Setting detail: THERAPIES SERIES
Discharge: HOME | End: 2023-08-25
Payer: COMMERCIAL

## 2023-08-25 DIAGNOSIS — M25.572 LEFT ANKLE PAIN, UNSPECIFIED CHRONICITY: Primary | ICD-10-CM

## 2023-08-25 PROCEDURE — 97110 THERAPEUTIC EXERCISES: CPT

## 2023-08-25 PROCEDURE — 97140 MANUAL THERAPY 1/> REGIONS: CPT

## 2023-08-25 NOTE — THERAPY TREATMENT NOTE
"  Outpatient Physical Therapy Ortho Treatment Note  [facility]     Patient Name: Brittni Hagen  : 1960  MRN: [id number]  Today's Date: 2023      Visit Date: 2023  Subjective Improvement: n/a  MD visit: TBD  Visit Number:   Total Approved:  Recert Date: TBD  Visit Dx:    ICD-10-CM ICD-9-CM   1. Left ankle pain, unspecified chronicity  M25.572 719.47       Patient Active Problem List   Diagnosis    Type 2 diabetes mellitus without complication, without long-term current use of insulin    Hyperlipidemia    Essential hypertension    Gastroesophageal reflux disease without esophagitis    Paroxysmal SVT (supraventricular tachycardia)    SOB (shortness of breath)    COPD (chronic obstructive pulmonary disease)        Past Medical History:   Diagnosis Date    Alpha galactosidase deficiency     red meat allergy; \"alpha gal syndrome\"    Anemia     Arthritis     Asthma     COPD (chronic obstructive pulmonary disease)     Diabetes mellitus     Endometriosis     Fibrocystic breast     Fibromyalgia     Goiter     Hyperlipidemia     Hypertension     takes bp meds to help kidneys    Neuropathy     Sleep apnea     SVT (supraventricular tachycardia)     Tinnitus     Vitamin D deficiency         Past Surgical History:   Procedure Laterality Date    CARDIAC ELECTROPHYSIOLOGY PROCEDURE N/A 2019    Procedure: EP/Ablation for Supraventricular Tachycardia;  Surgeon: Mckay Jiménez MD;  Location: [facility] INVASIVE LOCATION;  Service: Cardiology    CARPAL TUNNEL RELEASE Bilateral     LAMINECTOMY  2017    LAPAROSCOPIC TUBAL LIGATION      TUBAL ABDOMINAL LIGATION          PT Ortho       Row Name 23 0800       Precautions and Contraindications    Precautions hx of lumbar sx  -TL       Subjective Pain    Able to rate subjective pain? yes  -TL    Pre-Treatment Pain Level 7  -TL              User Key  (r) = Recorded By, (t) = Taken By, (c) = Cosigned By      Initials Name Provider " Type    Angle Anderson PTA Physical Therapist Assistant                                 PT Assessment/Plan       Row Name 08/25/23 0800          PT Assessment    Assessment Comments pt pain decrease by 2 points after therapy with manual. Pt tolerated new ex this date for arom and stretche on incline. PTA updated HEP. See educational tab for HEP. No swelling noted. Pt had to small brusies on left upper calf.  -TL        PT Plan    PT Frequency 2x/week  -TL     Predicted Duration of Therapy Intervention (PT) 6 weeks  -TL     PT Plan Comments start standing ex if pt able to tolerate next visit. Pt is scheduled aquatics on second visit of next week.  -TL               User Key  (r) = Recorded By, (t) = Taken By, (c) = Cosigned By      Initials Name Provider Type    Angle Anderson PTA Physical Therapist Assistant                       OP Exercises       Row Name 08/25/23 0900 08/25/23 0800          Subjective Comments    Subjective Comments -- Pt reported hurting . pt reported the fluidotherapy helped.  -TL        Subjective Pain    Able to rate subjective pain? -- yes  -TL     Pre-Treatment Pain Level -- 7  -TL     Post-Treatment Pain Level -- 5  -TL        Total Minutes    88064 - PT Manual Therapy Minutes 8  -TL --        Exercise 1    Exercise Name 1 -- fluidothrapy arom ankle left  -TL     Time 1 -- 15  -TL        Exercise 2    Exercise Name 2 -- seated hr/tr  -TL     Reps 2 -- 20  -TL        Exercise 3    Exercise Name 3 -- seated heelslides with heel down  -TL     Reps 3 -- 10  -TL     Time 3 -- 5 sec hold  -TL        Exercise 4    Exercise Name 4 -- standing incline s for gastroc/soleus  -TL        Exercise 5    Exercise Name 5 -- toe scrutches  -TL     Time 5 -- 3 mis  -TL        Exercise 6    Exercise Name 6 -- see manual  -TL               User Key  (r) = Recorded By, (t) = Taken By, (c) = Cosigned By      Initials Name Provider Type    Angle Anderson PTA Physical Therapist Assistant          "                    Manual Rx (last 36 hours)       Manual Treatments       Row Name 08/25/23 0900             Total Minutes    52111 - PT Manual Therapy Minutes 8  -TL         Manual Rx 1    Manual Rx 1 Location left gastroc/solesu  -TL      Manual Rx 1 Grade MFR/pincer  -TL         Manual Rx 2    Manual Rx 2 Location ant tib  -TL      Manual Rx 2 Grade pincer/MFR  -TL         Manual Rx 3    Manual Rx 3 Location arch of left foot  -TL      Manual Rx 3 Grade STM  -TL                User Key  (r) = Recorded By, (t) = Taken By, (c) = Cosigned By      Initials Name Provider Type    TL Angle Arana PTA Physical Therapist Assistant                     PT OP Goals       Row Name 08/25/23 0800          Long Term Goals    LTG Date to Achieve 10/04/23  -TL     LTG 1 Complete a 6' walk test without increased antalgics or pain  -TL     LTG 1 Progress Ongoing  -TL     LTG 2 Ankle MMT all planes 5/5  -TL     LTG 2 Progress Ongoing  -TL     LTG 3 SLS on LLE 5\" safely  -TL     LTG 3 Progress Ongoing  -TL        Time Calculation    PT Goal Re-Cert Due Date 09/13/23  -TL               User Key  (r) = Recorded By, (t) = Taken By, (c) = Cosigned By      Initials Name Provider Type    TL Angle Arana PTA Physical Therapist Assistant                    Therapy Education  Education Details: seated hr/TR, toe scrutches, towel S  Given: HEP  Program: New, Reinforced  How Provided: Verbal, Demonstration, Written  Provided to: Patient  Level of Understanding: Teach back education performed, Verbalized, Demonstrated              Time Calculation:   Start Time: 0845  Stop Time: 0938  Time Calculation (min): 53 min  Timed Charges  42745 - PT Manual Therapy Minutes: 8  Total Minutes  Timed Charges Total Minutes: 8   Total Minutes: 8  Therapy Charges for Today       Code Description Service Date Service Provider Modifiers Qty    24501479849 HC PT MANUAL THERAPY EA 15 MIN 8/25/2023 Angle Arana PTA GP, CQ 1    [id number] HC PT THER " PROC EA 15 MIN 8/25/2023 Angle Arana, PTA GP, CQ 3                      Angle Arana, BUZZ  8/25/2023

## 2023-08-28 ENCOUNTER — HOSPITAL ENCOUNTER (OUTPATIENT)
Dept: PHYSICAL THERAPY | Facility: HOSPITAL | Age: 63
Setting detail: THERAPIES SERIES
Discharge: HOME | End: 2023-08-28
Payer: COMMERCIAL

## 2023-08-28 DIAGNOSIS — M25.572 LEFT ANKLE PAIN, UNSPECIFIED CHRONICITY: Primary | ICD-10-CM

## 2023-08-28 PROCEDURE — 97110 THERAPEUTIC EXERCISES: CPT

## 2023-08-28 PROCEDURE — 97140 MANUAL THERAPY 1/> REGIONS: CPT

## 2023-08-28 NOTE — THERAPY TREATMENT NOTE
"  Outpatient Physical Therapy Ortho Treatment Note  [facility]     Patient Name: Brittni Hagen  : 1960  MRN: [id number]  Today's Date: 2023      Visit Date: 2023  Subjective Improvement: n/a  MD visit: TBD  Visit Number: 3/3  Total Approved:  Recert Date: TBD  Visit Dx:    ICD-10-CM ICD-9-CM   1. Left ankle pain, unspecified chronicity  M25.572 719.47       Patient Active Problem List   Diagnosis    Type 2 diabetes mellitus without complication, without long-term current use of insulin    Hyperlipidemia    Essential hypertension    Gastroesophageal reflux disease without esophagitis    Paroxysmal SVT (supraventricular tachycardia)    SOB (shortness of breath)    COPD (chronic obstructive pulmonary disease)        Past Medical History:   Diagnosis Date    Alpha galactosidase deficiency     red meat allergy; \"alpha gal syndrome\"    Anemia     Arthritis     Asthma     COPD (chronic obstructive pulmonary disease)     Diabetes mellitus     Endometriosis     Fibrocystic breast     Fibromyalgia     Goiter     Hyperlipidemia     Hypertension     takes bp meds to help kidneys    Neuropathy     Sleep apnea     SVT (supraventricular tachycardia)     Tinnitus     Vitamin D deficiency         Past Surgical History:   Procedure Laterality Date    CARDIAC ELECTROPHYSIOLOGY PROCEDURE N/A 2019    Procedure: EP/Ablation for Supraventricular Tachycardia;  Surgeon: Mckay Jiménez MD;  Location: [facility] INVASIVE LOCATION;  Service: Cardiology    CARPAL TUNNEL RELEASE Bilateral     LAMINECTOMY  2017    LAPAROSCOPIC TUBAL LIGATION      TUBAL ABDOMINAL LIGATION          PT Ortho       Row Name 23 0800       Subjective Comments    Subjective Comments pt reported done too much on Saturday. pt really hurting today.  -TL       Precautions and Contraindications    Precautions hx of lumbar sx  -TL       Subjective Pain    Able to rate subjective pain? yes  -TL    Pre-Treatment Pain " Level 9  -TL              User Key  (r) = Recorded By, (t) = Taken By, (c) = Cosigned By      Initials Name Provider Type    Angle Anderson PTA Physical Therapist Assistant                                 PT Assessment/Plan       Row Name 08/28/23 0900          PT Assessment    Assessment Comments Pt came into clinic with pain 9/10. Pt left clinic today with pain 6/10.  Pt tolerated standing ex HR/TR which were added to her HEP. Pt able to tolerate fwd step up on 4' step this date.  -TL        PT Plan    PT Frequency 2x/week  -TL     Predicted Duration of Therapy Intervention (PT) 6 weeks  -TL     PT Plan Comments aquatics next visit, next land treatment add 4-way ankle TB ex  -TL               User Key  (r) = Recorded By, (t) = Taken By, (c) = Cosigned By      Initials Name Provider Type    Angle Anderson PTA Physical Therapist Assistant                       OP Exercises       Row Name 08/28/23 0900 08/28/23 0800          Subjective Comments    Subjective Comments -- pt reported done too much on Saturday. pt really hurting today.  -TL        Subjective Pain    Able to rate subjective pain? -- yes  -TL     Pre-Treatment Pain Level -- 9  -TL     Post-Treatment Pain Level -- 6  -TL        Total Minutes    64457 - PT Manual Therapy Minutes 8  -TL --        Exercise 1    Exercise Name 1 -- fluidothrapy arom ankle left  -TL     Time 1 -- 15  -TL        Exercise 2    Exercise Name 2 -- seated hr/tr  -TL     Reps 2 -- 20  -TL        Exercise 3    Exercise Name 3 -- towel S  -TL     Sets 3 -- 3  -TL     Time 3 -- 30 sec hold  -TL        Exercise 4    Exercise Name 4 -- arom INV/EV with towel slides  -TL     Reps 4 -- 20  -TL        Exercise 5    Exercise Name 5 -- arch roller  -TL     Time 5 -- 3 mins  -TL        Exercise 6    Exercise Name 6 -- step up fwd 4'  -TL     Reps 6 -- 10  -TL        Exercise 7    Exercise Name 7 -- st heelraises/toe  -TL     Sets 7 -- 2  -TL     Reps 7 -- 10 each  -TL                "User Key  (r) = Recorded By, (t) = Taken By, (c) = Cosigned By      Initials Name Provider Type    Angle Anderson PTA Physical Therapist Assistant                             Manual Rx (last 36 hours)       Manual Treatments       Row Name 08/28/23 0900             Total Minutes    27150 - PT Manual Therapy Minutes 8  -TL         Manual Rx 1    Manual Rx 1 Location left gastroc/solesu  -TL      Manual Rx 1 Grade MFR/pincer  -TL         Manual Rx 2    Manual Rx 2 Location ant tib  -TL      Manual Rx 2 Grade pincer/MFR  -TL         Manual Rx 3    Manual Rx 3 Location arch of left foot  -TL      Manual Rx 3 Grade STM  -TL                User Key  (r) = Recorded By, (t) = Taken By, (c) = Cosigned By      Initials Name Provider Type    Angle Anderson PTA Physical Therapist Assistant                     PT OP Goals       Row Name 08/28/23 0900          Long Term Goals    LTG Date to Achieve 10/04/23  -TL     LTG 1 Complete a 6' walk test without increased antalgics or pain  -TL     LTG 1 Progress Ongoing  -TL     LTG 2 Ankle MMT all planes 5/5  -TL     LTG 2 Progress Ongoing  -TL     LTG 3 SLS on LLE 5\" safely  -TL     LTG 3 Progress Ongoing  -TL        Time Calculation    PT Goal Re-Cert Due Date 09/13/23  -TL               User Key  (r) = Recorded By, (t) = Taken By, (c) = Cosigned By      Initials Name Provider Type    Angle Anderson PTA Physical Therapist Assistant                    Therapy Education  Education Details: arch roller, st heelraises/toes  Given: HEP  Program: New  How Provided: Verbal, Demonstration, Written  Provided to: Patient  Level of Understanding: Teach back education performed, Verbalized, Demonstrated              Time Calculation:   Start Time: 0846  Stop Time: 0940  Time Calculation (min): 54 min  Timed Charges  40353 - PT Manual Therapy Minutes: 8  Total Minutes  Timed Charges Total Minutes: 8   Total Minutes: 8  Therapy Charges for Today       Code Description Service Date " Service Provider Modifiers Qty    20022464682  PT MANUAL THERAPY EA 15 MIN 8/28/2023 Angle Arana, PTA GP, CQ 1    11283663173 HC PT THER PROC EA 15 MIN 8/28/2023 Angle Arana, BUZZ GP, CQ 3                      Angle Arana, BUZZ  8/28/2023

## 2023-09-01 ENCOUNTER — HOSPITAL ENCOUNTER (OUTPATIENT)
Dept: PHYSICAL THERAPY | Facility: HOSPITAL | Age: 63
Setting detail: THERAPIES SERIES
Discharge: HOME | End: 2023-09-01
Payer: OTHER GOVERNMENT

## 2023-09-01 DIAGNOSIS — M25.572 LEFT ANKLE PAIN, UNSPECIFIED CHRONICITY: Primary | ICD-10-CM

## 2023-09-01 PROCEDURE — 97113 AQUATIC THERAPY/EXERCISES: CPT

## 2023-09-01 NOTE — THERAPY TREATMENT NOTE
"  Outpatient Physical Therapy Ortho Treatment Note  [facility]     Patient Name: Brittni Hagen  : 1960  MRN: [id number]  Today's Date: 2023      Visit Date: 2023  Subjective Improvement: n/a  MD visit: TBMARCELLA  Visit Number:   Total Approved:  Recert Date: TBD  Visit Dx:    ICD-10-CM ICD-9-CM   1. Left ankle pain, unspecified chronicity  M25.572 719.47       Patient Active Problem List   Diagnosis    Type 2 diabetes mellitus without complication, without long-term current use of insulin    Hyperlipidemia    Essential hypertension    Gastroesophageal reflux disease without esophagitis    Paroxysmal SVT (supraventricular tachycardia)    SOB (shortness of breath)    COPD (chronic obstructive pulmonary disease)        Past Medical History:   Diagnosis Date    Alpha galactosidase deficiency     red meat allergy; \"alpha gal syndrome\"    Anemia     Arthritis     Asthma     COPD (chronic obstructive pulmonary disease)     Diabetes mellitus     Endometriosis     Fibrocystic breast     Fibromyalgia     Goiter     Hyperlipidemia     Hypertension     takes bp meds to help kidneys    Neuropathy     Sleep apnea     SVT (supraventricular tachycardia)     Tinnitus     Vitamin D deficiency         Past Surgical History:   Procedure Laterality Date    CARDIAC ELECTROPHYSIOLOGY PROCEDURE N/A 2019    Procedure: EP/Ablation for Supraventricular Tachycardia;  Surgeon: Mckay Jiménez MD;  Location: [facility] INVASIVE LOCATION;  Service: Cardiology    CARPAL TUNNEL RELEASE Bilateral     LAMINECTOMY  2017    LAPAROSCOPIC TUBAL LIGATION      TUBAL ABDOMINAL LIGATION          PT Ortho       Row Name 23 1000       Subjective Comments    Subjective Comments pt reports doing better today with pain but has not done much today.  -TL       Precautions and Contraindications    Precautions hx of lumbar sx  -TL       Subjective Pain    Able to rate subjective pain? yes  -TL    Pre-Treatment " Pain Level 5  -TL    Post-Treatment Pain Level 3  -TL              User Key  (r) = Recorded By, (t) = Taken By, (c) = Cosigned By      Initials Name Provider Type    Angle Anderson PTA Physical Therapist Assistant                                 PT Assessment/Plan       Row Name 09/01/23 1000          PT Assessment    Assessment Comments pt areported that she liked the aquatics. No increase c/o pain with standing TR/HR or SLS. pt able to bear weight with ex . No new c/o's this date. Working toward goals.  pt pain decrease by 2 points.  -TL        PT Plan    PT Frequency 2x/week  -TL     Predicted Duration of Therapy Intervention (PT) 6 weeks  -TL     PT Plan Comments add step up next visit in aquatics  -TL               User Key  (r) = Recorded By, (t) = Taken By, (c) = Cosigned By      Initials Name Provider Type    Angle Anderson PTA Physical Therapist Assistant                       OP Exercises       Row Name 09/01/23 1000             Subjective Comments    Subjective Comments pt reports doing better today with pain but has not done much today.  -TL         Subjective Pain    Able to rate subjective pain? yes  -TL      Pre-Treatment Pain Level 5  -TL      Post-Treatment Pain Level 3  -TL         Exercise 1    Exercise Name 1 aqua walk, fwd/lat/back  -TL      Reps 1 5 mins each direction  -TL         Exercise 2    Exercise Name 2 aqua TR/HR  -TL      Reps 2 15  -TL         Exercise 3    Exercise Name 3 aqua ms  -TL      Reps 3 16  -TL         Exercise 4    Exercise Name 4 aqua march  -TL      Reps 4 15  -TL         Exercise 5    Exercise Name 5 aqua SLR 3-way  -TL      Reps 5 10  -TL         Exercise 6    Exercise Name 6 aqua SLS  -TL      Sets 6 4  -TL      Time 6 5,11,11,21 sec  -TL         Exercise 7    Exercise Name 7 breaking down gait  -TL      Cueing 7 Demo;Verbal  -TL      Additional Comments focused on push off  -TL         Exercise 8    Exercise Name 8 ankle pumps/circles arom  -TL      Reps  8 20 each  -TL      Additional Comments while sitting on steps  -TL                User Key  (r) = Recorded By, (t) = Taken By, (c) = Cosigned By      Initials Name Provider Type    Angle Anderson PTA Physical Therapist Assistant                                                    Time Calculation:   Start Time: 1018  Stop Time: 1057  Time Calculation (min): 39 min  Therapy Charges for Today       Code Description Service Date Service Provider Modifiers Qty    60150148095 HC PT AQUATIC THERAPY EA 15 MIN 9/1/2023 Angle Arana PTA GP, CQ 3                      Angle Arana PTA  9/1/2023

## 2023-09-05 ENCOUNTER — HOSPITAL ENCOUNTER (OUTPATIENT)
Dept: PHYSICAL THERAPY | Facility: HOSPITAL | Age: 63
Setting detail: THERAPIES SERIES
Discharge: HOME | End: 2023-09-05
Payer: OTHER GOVERNMENT

## 2023-09-05 DIAGNOSIS — M25.572 LEFT ANKLE PAIN, UNSPECIFIED CHRONICITY: Primary | ICD-10-CM

## 2023-09-05 PROCEDURE — 97110 THERAPEUTIC EXERCISES: CPT

## 2023-09-05 PROCEDURE — 97140 MANUAL THERAPY 1/> REGIONS: CPT

## 2023-09-05 NOTE — THERAPY TREATMENT NOTE
"  Outpatient Physical Therapy Ortho Treatment Note  [facility]     Patient Name: Brittni Hagen  : 1960  MRN: [id number]  Today's Date: 2023      Visit Date: 2023  Subjective Improvement: some  MD visit: LAXMI  Visit Number:   Total Approved:20  Recert Date: TBD  Visit Dx:    ICD-10-CM ICD-9-CM   1. Left ankle pain, unspecified chronicity  M25.572 719.47       Patient Active Problem List   Diagnosis    Type 2 diabetes mellitus without complication, without long-term current use of insulin    Hyperlipidemia    Essential hypertension    Gastroesophageal reflux disease without esophagitis    Paroxysmal SVT (supraventricular tachycardia)    SOB (shortness of breath)    COPD (chronic obstructive pulmonary disease)        Past Medical History:   Diagnosis Date    Alpha galactosidase deficiency     red meat allergy; \"alpha gal syndrome\"    Anemia     Arthritis     Asthma     COPD (chronic obstructive pulmonary disease)     Diabetes mellitus     Endometriosis     Fibrocystic breast     Fibromyalgia     Goiter     Hyperlipidemia     Hypertension     takes bp meds to help kidneys    Neuropathy     Sleep apnea     SVT (supraventricular tachycardia)     Tinnitus     Vitamin D deficiency         Past Surgical History:   Procedure Laterality Date    CARDIAC ELECTROPHYSIOLOGY PROCEDURE N/A 2019    Procedure: EP/Ablation for Supraventricular Tachycardia;  Surgeon: Mckay Jiménez MD;  Location: [facility] INVASIVE LOCATION;  Service: Cardiology    CARPAL TUNNEL RELEASE Bilateral     LAMINECTOMY  2017    LAPAROSCOPIC TUBAL LIGATION      TUBAL ABDOMINAL LIGATION          PT Ortho       Row Name 23 0800       Subjective Comments    Subjective Comments Pt still having pain. Pt reported the pool helped.  -TL       Precautions and Contraindications    Precautions hx of lumbar sx  -TL       Subjective Pain    Able to rate subjective pain? yes  -TL    Pre-Treatment Pain Level 6  -TL    " Post-Treatment Pain Level 4  -TL              User Key  (r) = Recorded By, (t) = Taken By, (c) = Cosigned By      Initials Name Provider Type    Angle Anderson PTA Physical Therapist Assistant                                 PT Assessment/Plan       Row Name 09/05/23 0900          PT Assessment    Assessment Comments Manual helped decrease pain by 2 points. Pt needed cues on quality gait with 6min walk. Focused on heelstrike and push off. Pt had good hip and knee ext. Pt met SLS for 5 sec hold on left foot. STG #3 met  -TL        PT Plan    PT Frequency 2x/week  -TL     Predicted Duration of Therapy Intervention (PT) 6 weeks  -TL     PT Plan Comments aquatic next visit  -TL               User Key  (r) = Recorded By, (t) = Taken By, (c) = Cosigned By      Initials Name Provider Type    Angle Anderson PTA Physical Therapist Assistant                       OP Exercises       Row Name 09/05/23 0900 09/05/23 0800          Subjective Comments    Subjective Comments -- Pt still having pain. Pt reported the pool helped.  -TL        Subjective Pain    Able to rate subjective pain? -- yes  -TL     Pre-Treatment Pain Level -- 6  -TL     Post-Treatment Pain Level -- 4  -TL        Total Minutes    51330 - PT Manual Therapy Minutes 10  -TL --        Exercise 1    Exercise Name 1 -- incline s gastroc/soleus  -TL     Reps 1 -- 3  -TL     Time 1 -- 30 sec hold  -TL        Exercise 2    Exercise Name 2 -- PF S  -TL     Sets 2 -- 3  -TL     Time 2 -- 30 sec hold  -TL     Additional Comments -- left  -TL        Exercise 3    Exercise Name 3 -- step up 4' fwd  -TL     Sets 3 -- 2  -TL     Reps 3 -- 10  -TL        Exercise 4    Exercise Name 4 -- step up lat 4'  -TL     Sets 4 -- 2  -TL     Reps 4 -- 10  -TL        Exercise 5    Exercise Name 5 -- step up on bosu with left while right marches  -TL     Sets 5 -- 2  -TL     Reps 5 -- 10  -TL        Exercise 6    Exercise Name 6 -- step over bosu  -TL     Sets 6 -- 1  -TL      "Reps 6 -- 15  -TL        Exercise 7    Exercise Name 7 -- 6min walk test  -TL     Cueing 7 -- Verbal;Tactile  -TL     Additional Comments -- cues to heelstrike and push off  -TL        Exercise 8    Exercise Name 8 -- see manual  -TL               User Key  (r) = Recorded By, (t) = Taken By, (c) = Cosigned By      Initials Name Provider Type    Angle Anderson PTA Physical Therapist Assistant                             Manual Rx (last 36 hours)       Manual Treatments       Row Name 09/05/23 0900             Total Minutes    39015 - PT Manual Therapy Minutes 10  -TL         Manual Rx 1    Manual Rx 1 Location arch of left foot  -TL      Manual Rx 1 Grade STM  -TL         Manual Rx 2    Manual Rx 2 Location big Toe S  -TL      Manual Rx 2 Grade 3/30  -TL         Manual Rx 3    Manual Rx 3 Location ant tib  -TL      Manual Rx 3 Grade pincer/MFR  -TL                User Key  (r) = Recorded By, (t) = Taken By, (c) = Cosigned By      Initials Name Provider Type    Angle Anderson PTA Physical Therapist Assistant                     PT OP Goals       Row Name 09/05/23 0900          Long Term Goals    LTG Date to Achieve 10/04/23  -TL     LTG 1 Complete a 6' walk test without increased antalgics or pain  -TL     LTG 1 Progress Ongoing;Progressing  -TL     LTG 2 Ankle MMT all planes 5/5  -TL     LTG 2 Progress Ongoing  -TL     LTG 3 SLS on LLE 5\" safely  -TL     LTG 3 Progress Met  -TL     LTG 3 Progress Comments 10.31,12.51  -TL        Time Calculation    PT Goal Re-Cert Due Date 09/13/23  -TL               User Key  (r) = Recorded By, (t) = Taken By, (c) = Cosigned By      Initials Name Provider Type    Angle Anderson PTA Physical Therapist Assistant                                   Time Calculation:   Start Time: 0846  Stop Time: 0941  Time Calculation (min): 55 min  Timed Charges  16608 - PT Manual Therapy Minutes: 10  Total Minutes  Timed Charges Total Minutes: 10   Total Minutes: 10  Therapy Charges " for Today       Code Description Service Date Service Provider Modifiers Qty    64624730096 HC PT MANUAL THERAPY EA 15 MIN 9/5/2023 Angle Arana, PTA GP, CQ 1    46021061008 HC PT THER PROC EA 15 MIN 9/5/2023 Angle Arana, BUZZ GP, CQ 3                      Angle Arana, PTA  9/5/2023

## 2023-09-07 DIAGNOSIS — R06.09 DOE (DYSPNEA ON EXERTION): Primary | ICD-10-CM

## 2023-09-11 ENCOUNTER — APPOINTMENT (OUTPATIENT)
Dept: PHYSICAL THERAPY | Facility: HOSPITAL | Age: 63
End: 2023-09-11
Payer: OTHER GOVERNMENT

## 2023-09-12 ENCOUNTER — HOSPITAL ENCOUNTER (OUTPATIENT)
Dept: PHYSICAL THERAPY | Facility: HOSPITAL | Age: 63
Setting detail: THERAPIES SERIES
Discharge: HOME | End: 2023-09-12
Payer: OTHER GOVERNMENT

## 2023-09-12 DIAGNOSIS — M25.572 LEFT ANKLE PAIN, UNSPECIFIED CHRONICITY: Primary | ICD-10-CM

## 2023-09-12 PROCEDURE — 97113 AQUATIC THERAPY/EXERCISES: CPT

## 2023-09-12 NOTE — THERAPY TREATMENT NOTE
"  Outpatient Physical Therapy Ortho Treatment Note  [facility]     Patient Name: Brittni Hagen  : 1960  MRN: [id number]  Today's Date: 2023      Visit Date: 2023  Subjective Improvement: some  MD visit: TBD  Visit Number:   Total Approved:20  Recert Date: TBD  Visit Dx:    ICD-10-CM ICD-9-CM   1. Left ankle pain, unspecified chronicity  M25.572 719.47       Patient Active Problem List   Diagnosis    Type 2 diabetes mellitus without complication, without long-term current use of insulin    Hyperlipidemia    Essential hypertension    Gastroesophageal reflux disease without esophagitis    Paroxysmal SVT (supraventricular tachycardia)    SOB (shortness of breath)    COPD (chronic obstructive pulmonary disease)        Past Medical History:   Diagnosis Date    Alpha galactosidase deficiency     red meat allergy; \"alpha gal syndrome\"    Anemia     Arthritis     Asthma     COPD (chronic obstructive pulmonary disease)     Diabetes mellitus     Endometriosis     Fibrocystic breast     Fibromyalgia     Goiter     Hyperlipidemia     Hypertension     takes bp meds to help kidneys    Neuropathy     Sleep apnea     SVT (supraventricular tachycardia)     Tinnitus     Vitamin D deficiency         Past Surgical History:   Procedure Laterality Date    CARDIAC ELECTROPHYSIOLOGY PROCEDURE N/A 2019    Procedure: EP/Ablation for Supraventricular Tachycardia;  Surgeon: Mckay Jiménez MD;  Location: [facility] INVASIVE LOCATION;  Service: Cardiology    CARPAL TUNNEL RELEASE Bilateral     LAMINECTOMY  2017    LAPAROSCOPIC TUBAL LIGATION      TUBAL ABDOMINAL LIGATION          PT Ortho       Row Name 23 1100       Precautions and Contraindications    Precautions hx of lumbar sx  -TL       Subjective Pain    Able to rate subjective pain? yes  -TL    Pre-Treatment Pain Level 8  -TL              User Key  (r) = Recorded By, (t) = Taken By, (c) = Cosigned By      Initials Name Provider " Type    TL Angle Arana PTA Physical Therapist Assistant                                 PT Assessment/Plan       Row Name 09/12/23 1300          PT Assessment    Assessment Comments pt reports not much progress with pain. Pt reports pain decreases after therapy but does not last long before it comes back. Pt reports doing her HEP. Pt did say she missed therapy last week secondary to having the stomach bug. pt tolerated CKC ex with ankle this date in the aquatic therapy without increase pain. Pt reports pain decrease by 2 points.  PTA noticed pt not walking with cane today. pt gait less antalgic this date.  -TL        PT Plan    PT Frequency 2x/week  -TL     Predicted Duration of Therapy Intervention (PT) 6 weeks  -TL     PT Plan Comments continue manual land visit  -TL               User Key  (r) = Recorded By, (t) = Taken By, (c) = Cosigned By      Initials Name Provider Type    TL Angle Arana PTA Physical Therapist Assistant                       OP Exercises       Row Name 09/12/23 1100             Subjective    Subjective Comments Pt reports therapy is not helping alot. Pt comes back. pt reports she feels better after therapy but pain increases later.  Pt reports had the stomach flu.  -TL         Subjective Pain    Able to rate subjective pain? yes  -TL      Pre-Treatment Pain Level 8  -TL      Post-Treatment Pain Level 6  -TL         Exercise 1    Exercise Name 1 aquatic walk fwd/lat/back  -TL      Time 1 5 mins each directions  -TL         Exercise 2    Exercise Name 2 aquatic step up fwd/lat  -TL      Reps 2 15  -TL         Exercise 3    Exercise Name 3 aqua heelraises/toe raises  -TL      Reps 3 15  -TL         Exercise 4    Exercise Name 4 aqua SLS left  -TL      Sets 4 3  -TL      Time 4 4.73,13.72,10.90  -TL         Exercise 5    Exercise Name 5 aqua 553  -TL         Exercise 6    Exercise Name 6 aqua march  -TL      Reps 6 20  -TL         Exercise 7    Exercise Name 7 aqua ms  -TL      Reps 7  "20  -TL         Exercise 8    Exercise Name 8 --  -TL                User Key  (r) = Recorded By, (t) = Taken By, (c) = Cosigned By      Initials Name Provider Type    Angle Anderson PTA Physical Therapist Assistant                                  PT OP Goals       Row Name 09/12/23 1100          Long Term Goals    LTG Date to Achieve 10/04/23  -TL     LTG 1 Complete a 6' walk test without increased antalgics or pain  -TL     LTG 1 Progress Ongoing;Progressing  -TL     LTG 2 Ankle MMT all planes 5/5  -TL     LTG 2 Progress Ongoing  -TL     LTG 3 SLS on LLE 5\" safely  -TL     LTG 3 Progress Met  -TL        Time Calculation    PT Goal Re-Cert Due Date 09/13/23  -TL               User Key  (r) = Recorded By, (t) = Taken By, (c) = Cosigned By      Initials Name Provider Type    Angle Anderson PTA Physical Therapist Assistant                                   Time Calculation:      Therapy Charges for Today       Code Description Service Date Service Provider Modifiers Qty    46302343168 HC PT AQUATIC THERAPY EA 15 MIN 9/12/2023 Angle Arana PTA GP, CQ 3                      Angle Arana PTA  9/12/2023     "

## 2023-09-15 ENCOUNTER — HOSPITAL ENCOUNTER (OUTPATIENT)
Dept: PHYSICAL THERAPY | Facility: HOSPITAL | Age: 63
Setting detail: THERAPIES SERIES
Discharge: HOME | End: 2023-09-15
Payer: OTHER GOVERNMENT

## 2023-09-15 DIAGNOSIS — M25.572 LEFT ANKLE PAIN, UNSPECIFIED CHRONICITY: Primary | ICD-10-CM

## 2023-09-15 PROCEDURE — 97110 THERAPEUTIC EXERCISES: CPT

## 2023-09-15 PROCEDURE — 97140 MANUAL THERAPY 1/> REGIONS: CPT

## 2023-09-15 NOTE — THERAPY TREATMENT NOTE
"  Outpatient Physical Therapy Ortho Treatment Note  [facility]     Patient Name: Brittni Hagen  : 1960  MRN: [id number]  Today's Date: 9/15/2023      Visit Date: 09/15/2023  Subjective Improvement: some  MD visit: LAXMI  Visit Number:   Total Approved:20  Recert Date:23  Visit Dx:    ICD-10-CM ICD-9-CM   1. Left ankle pain, unspecified chronicity  M25.572 719.47       Patient Active Problem List   Diagnosis    Type 2 diabetes mellitus without complication, without long-term current use of insulin    Hyperlipidemia    Essential hypertension    Gastroesophageal reflux disease without esophagitis    Paroxysmal SVT (supraventricular tachycardia)    SOB (shortness of breath)    COPD (chronic obstructive pulmonary disease)        Past Medical History:   Diagnosis Date    Alpha galactosidase deficiency     red meat allergy; \"alpha gal syndrome\"    Anemia     Arthritis     Asthma     COPD (chronic obstructive pulmonary disease)     Diabetes mellitus     Endometriosis     Fibrocystic breast     Fibromyalgia     Goiter     Hyperlipidemia     Hypertension     takes bp meds to help kidneys    Neuropathy     Sleep apnea     SVT (supraventricular tachycardia)     Tinnitus     Vitamin D deficiency         Past Surgical History:   Procedure Laterality Date    CARDIAC ELECTROPHYSIOLOGY PROCEDURE N/A 2019    Procedure: EP/Ablation for Supraventricular Tachycardia;  Surgeon: Mckay Jiménez MD;  Location: [facility] INVASIVE LOCATION;  Service: Cardiology    CARPAL TUNNEL RELEASE Bilateral     LAMINECTOMY  2017    LAPAROSCOPIC TUBAL LIGATION      TUBAL ABDOMINAL LIGATION          PT Ortho       Row Name 09/15/23 0800       Subjective    Subjective Comments Pt reports therapy helping today. Pt reports 40% improvement.  Pt not using the cane the last few visits.  -TL       Precautions and Contraindications    Precautions hx of lumbar sx  -TL       Subjective Pain    Able to rate subjective " pain? yes  -TL    Pre-Treatment Pain Level 4  -TL    Post-Treatment Pain Level 1  -TL              User Key  (r) = Recorded By, (t) = Taken By, (c) = Cosigned By      Initials Name Provider Type    Angle Anderson PTA Physical Therapist Assistant                                 PT Assessment/Plan       Row Name 09/15/23 0800          PT Assessment    Assessment Comments Pt reports 40% improved. Pt has come back the last few visit without assisted device. pt pain decrease by 3 points after manual. pt rom is improving along with strength.  Pt LTG #3 met but progressing toward other goals.  -TL        PT Plan    PT Frequency 2x/week  -TL     Predicted Duration of Therapy Intervention (PT) 6 weeks  -TL     PT Plan Comments Will do 6 min walk test next land treatment.  -TL               User Key  (r) = Recorded By, (t) = Taken By, (c) = Cosigned By      Initials Name Provider Type    Angle Anderson PTA Physical Therapist Assistant                       OP Exercises       Row Name 09/15/23 0800 09/15/23 0700          Subjective    Subjective Comments Pt reports therapy helping today. Pt reports 40% improvement.  Pt not using the cane the last few visits.  -TL --        Subjective Pain    Able to rate subjective pain? yes  -TL --     Pre-Treatment Pain Level 4  -TL --     Post-Treatment Pain Level 1  -TL --        Total Minutes    68179 - PT Manual Therapy Minutes -- 8  -TL        Exercise 1    Exercise Name 1 pro ll level 3.5 for leg strengthening  -TL --     Time 1 10 mins  -TL --        Exercise 2    Exercise Name 2 stepping on bosu fwd  -TL --     Sets 2 2  -TL --     Reps 2 10  -TL --     Additional Comments opposite knee flexion  -TL --        Exercise 3    Exercise Name 3 stepping lateral side stepping  -TL --     Sets 3 2  -TL --     Reps 3 10  -TL --        Exercise 4    Exercise Name 4 incline S gastroc/Soleus  -TL --     Sets 4 3  -TL --     Time 4 30 sec hold  -TL --        Exercise 5    Exercise  "Name 5 4-way-ankle GTB  -TL --     Reps 5 20 each direction  -TL --               User Key  (r) = Recorded By, (t) = Taken By, (c) = Cosigned By      Initials Name Provider Type    Angle Anderson PTA Physical Therapist Assistant                             Manual Rx (last 36 hours)       Manual Treatments       Row Name 09/15/23 0700             Total Minutes    85910 - PT Manual Therapy Minutes 8  -TL         Manual Rx 1    Manual Rx 1 Location arch of left foot  -TL      Manual Rx 1 Grade STM  -TL         Manual Rx 2    Manual Rx 2 Location calf  -TL      Manual Rx 2 Grade pincer  -TL         Manual Rx 3    Manual Rx 3 Location ant tib  -TL      Manual Rx 3 Grade pincer/MFR  -TL                User Key  (r) = Recorded By, (t) = Taken By, (c) = Cosigned By      Initials Name Provider Type    Angle Anderson PTA Physical Therapist Assistant                     PT OP Goals       Row Name 09/15/23 0800          Long Term Goals    LTG Date to Achieve 10/04/23  -TL     LTG 1 Complete a 6' walk test without increased antalgics or pain  -TL     LTG 1 Progress Ongoing;Progressing  -TL     LTG 2 Ankle MMT all planes 5/5  -TL     LTG 2 Progress Ongoing;Progressing  -TL     LTG 3 SLS on LLE 5\" safely  -TL     LTG 3 Progress Met  -TL        Time Calculation    PT Goal Re-Cert Due Date 09/13/23  -TL               User Key  (r) = Recorded By, (t) = Taken By, (c) = Cosigned By      Initials Name Provider Type    Angle Anderson PTA Physical Therapist Assistant                    Therapy Education  Education Details: 4-way GTB  Given: HEP  Program: New, Reinforced  How Provided: Verbal, Demonstration, Written  Provided to: Patient  Level of Understanding: Teach back education performed, Verbalized, Demonstrated              Time Calculation:   Start Time: 0802  Stop Time: 0842  Time Calculation (min): 40 min  Timed Charges  12731 - PT Manual Therapy Minutes: 8  Total Minutes  Timed Charges Total Minutes: 8   Total " Minutes: 8  Therapy Charges for Today       Code Description Service Date Service Provider Modifiers Qty    [id number]  PT MANUAL THERAPY EA 15 MIN 9/15/2023 Angle Arana, PTA GP, CQ 1    [id number]  PT THER PROC EA 15 MIN 9/15/2023 Angle Arana, BUZZ GP, CQ 2                      Angle Arana, BUZZ  9/15/2023

## 2023-09-19 ENCOUNTER — APPOINTMENT (OUTPATIENT)
Dept: PHYSICAL THERAPY | Facility: HOSPITAL | Age: 63
End: 2023-09-19
Payer: OTHER GOVERNMENT

## 2023-09-21 ENCOUNTER — APPOINTMENT (OUTPATIENT)
Dept: PHYSICAL THERAPY | Facility: HOSPITAL | Age: 63
End: 2023-09-21
Payer: OTHER GOVERNMENT

## 2023-09-26 ENCOUNTER — APPOINTMENT (OUTPATIENT)
Dept: PHYSICAL THERAPY | Facility: HOSPITAL | Age: 63
End: 2023-09-26
Payer: OTHER GOVERNMENT

## (undated) DEVICE — KT INTRO MINISTICK MAX W/GW NITNL/TUNG ECHO 4F 21G 7CM

## (undated) DEVICE — INTRO SHEATH ULTIMUM ACT 5F

## (undated) DEVICE — INTRO HEMO FAST CATH TRIO LK .038 10F 12CM

## (undated) DEVICE — PERCLOSE PROGLIDE™ SUTURE-MEDIATED CLOSURE SYSTEM: Brand: PERCLOSE PROGLIDE™

## (undated) DEVICE — PAD E/S GRND SGL/FOIL 9FT/CORD DISP

## (undated) DEVICE — CATH ABL LIVEWIRE TC QUAD BI DIR MD SWEEP 7F 4MM 115CM

## (undated) DEVICE — PK CATH LAB 60

## (undated) DEVICE — ST CVR PROB PULLUP ULTRASND 5X48IN

## (undated) DEVICE — ELECTRODE,RT,STRESS,FOAM,50PK: Brand: MEDLINE

## (undated) DEVICE — CATH EP SUPREME QUADPOLAR JSN 4F 5-5-5M 120CM

## (undated) DEVICE — ELECTRD ECD ENSITE VELOCITY NAVX PTCH

## (undated) DEVICE — INTRO SHEATH FASTCATH SRO .038IN 8.5F 63CM

## (undated) DEVICE — INTRO SHEATH ENGAGE W/50 GW .038 8F12